# Patient Record
Sex: MALE | Race: WHITE | NOT HISPANIC OR LATINO | Employment: OTHER | URBAN - METROPOLITAN AREA
[De-identification: names, ages, dates, MRNs, and addresses within clinical notes are randomized per-mention and may not be internally consistent; named-entity substitution may affect disease eponyms.]

---

## 2023-03-24 LAB
ALANINE AMINOTRANSFERASE (SGPT) (U/L) IN SER/PLAS: 6 U/L (ref 10–52)
ALBUMIN (G/DL) IN SER/PLAS: 4.1 G/DL (ref 3.4–5)
ALBUMIN (MG/L) IN URINE: 58.3 MG/L
ALBUMIN/CREATININE (UG/MG) IN URINE: 49.8 UG/MG CRT (ref 0–30)
ALKALINE PHOSPHATASE (U/L) IN SER/PLAS: 83 U/L (ref 33–136)
ANION GAP IN SER/PLAS: 12 MMOL/L (ref 10–20)
ASPARTATE AMINOTRANSFERASE (SGOT) (U/L) IN SER/PLAS: 9 U/L (ref 9–39)
BILIRUBIN TOTAL (MG/DL) IN SER/PLAS: 0.8 MG/DL (ref 0–1.2)
CALCIUM (MG/DL) IN SER/PLAS: 9.2 MG/DL (ref 8.6–10.6)
CARBON DIOXIDE, TOTAL (MMOL/L) IN SER/PLAS: 29 MMOL/L (ref 21–32)
CHLORIDE (MMOL/L) IN SER/PLAS: 104 MMOL/L (ref 98–107)
CHOLESTEROL (MG/DL) IN SER/PLAS: 90 MG/DL (ref 0–199)
CHOLESTEROL IN HDL (MG/DL) IN SER/PLAS: 26.8 MG/DL
CHOLESTEROL/HDL RATIO: 3.4
CREATININE (MG/DL) IN SER/PLAS: 1.04 MG/DL (ref 0.5–1.3)
CREATININE (MG/DL) IN URINE: 117 MG/DL (ref 20–370)
ERYTHROCYTE DISTRIBUTION WIDTH (RATIO) BY AUTOMATED COUNT: 19.8 % (ref 11.5–14.5)
ERYTHROCYTE MEAN CORPUSCULAR HEMOGLOBIN CONCENTRATION (G/DL) BY AUTOMATED: 31.5 G/DL (ref 32–36)
ERYTHROCYTE MEAN CORPUSCULAR VOLUME (FL) BY AUTOMATED COUNT: 90 FL (ref 80–100)
ERYTHROCYTES (10*6/UL) IN BLOOD BY AUTOMATED COUNT: 4.53 X10E12/L (ref 4.5–5.9)
ESTIMATED AVERAGE GLUCOSE FOR HBA1C: 169 MG/DL
GFR MALE: 78 ML/MIN/1.73M2
GLUCOSE (MG/DL) IN SER/PLAS: 104 MG/DL (ref 74–99)
HEMATOCRIT (%) IN BLOOD BY AUTOMATED COUNT: 40.9 % (ref 41–52)
HEMOGLOBIN (G/DL) IN BLOOD: 12.9 G/DL (ref 13.5–17.5)
HEMOGLOBIN A1C/HEMOGLOBIN TOTAL IN BLOOD: 7.5 %
LDL: 46 MG/DL (ref 0–99)
LEUKOCYTES (10*3/UL) IN BLOOD BY AUTOMATED COUNT: 8.3 X10E9/L (ref 4.4–11.3)
NATRIURETIC PEPTIDE B (PG/ML) IN SER/PLAS: 1192 PG/ML (ref 0–99)
NRBC (PER 100 WBCS) BY AUTOMATED COUNT: 0 /100 WBC (ref 0–0)
PLATELETS (10*3/UL) IN BLOOD AUTOMATED COUNT: 97 X10E9/L (ref 150–450)
POTASSIUM (MMOL/L) IN SER/PLAS: 4.2 MMOL/L (ref 3.5–5.3)
PROTEIN TOTAL: 6.7 G/DL (ref 6.4–8.2)
SODIUM (MMOL/L) IN SER/PLAS: 141 MMOL/L (ref 136–145)
TRIGLYCERIDE (MG/DL) IN SER/PLAS: 86 MG/DL (ref 0–149)
UREA NITROGEN (MG/DL) IN SER/PLAS: 22 MG/DL (ref 6–23)
VLDL: 17 MG/DL (ref 0–40)

## 2023-08-31 PROBLEM — M54.2 CERVICALGIA: Status: ACTIVE | Noted: 2023-08-31

## 2023-08-31 PROBLEM — L03.90 CELLULITIS: Status: ACTIVE | Noted: 2023-08-31

## 2023-08-31 PROBLEM — M47.27 LUMBOSACRAL SPONDYLOSIS WITH RADICULOPATHY: Status: ACTIVE | Noted: 2023-08-31

## 2023-08-31 PROBLEM — E78.00 ELEVATED LDL CHOLESTEROL LEVEL: Status: ACTIVE | Noted: 2023-08-31

## 2023-08-31 PROBLEM — R29.898 WEAKNESS OF LEFT HAND: Status: ACTIVE | Noted: 2023-08-31

## 2023-08-31 PROBLEM — R50.9 FEVER: Status: ACTIVE | Noted: 2023-08-31

## 2023-08-31 PROBLEM — M48.02 CERVICAL SPINAL STENOSIS: Status: ACTIVE | Noted: 2023-08-31

## 2023-08-31 PROBLEM — R55 SYNCOPE: Status: ACTIVE | Noted: 2023-08-31

## 2023-08-31 PROBLEM — I25.10 CAD (CORONARY ARTERY DISEASE): Status: ACTIVE | Noted: 2023-08-31

## 2023-08-31 PROBLEM — R30.0 DYSURIA: Status: ACTIVE | Noted: 2023-08-31

## 2023-08-31 PROBLEM — D32.9 MENINGIOMA (MULTI): Status: ACTIVE | Noted: 2023-08-31

## 2023-08-31 PROBLEM — Z95.810 CARDIAC DEFIBRILLATOR IN PLACE: Status: ACTIVE | Noted: 2023-08-31

## 2023-08-31 PROBLEM — L27.0 DRUG RASH: Status: ACTIVE | Noted: 2023-08-31

## 2023-08-31 PROBLEM — G47.00 INSOMNIA: Status: ACTIVE | Noted: 2023-08-31

## 2023-08-31 PROBLEM — G89.29 OTHER CHRONIC PAIN: Status: ACTIVE | Noted: 2023-08-31

## 2023-08-31 PROBLEM — M47.22 OSTEOARTHRITIS OF SPINE WITH RADICULOPATHY, CERVICAL REGION: Status: ACTIVE | Noted: 2023-08-31

## 2023-08-31 PROBLEM — R60.0 LOWER LEG EDEMA: Status: ACTIVE | Noted: 2023-08-31

## 2023-08-31 PROBLEM — I10 HYPERTENSION: Status: ACTIVE | Noted: 2023-08-31

## 2023-08-31 PROBLEM — E11.9 DIABETES MELLITUS (MULTI): Status: ACTIVE | Noted: 2023-08-31

## 2023-08-31 PROBLEM — R60.9 EDEMA: Status: ACTIVE | Noted: 2023-08-31

## 2023-08-31 PROBLEM — E11.44 DIABETIC AMYOTROPHY ASSOCIATED WITH TYPE 2 DIABETES MELLITUS (MULTI): Status: ACTIVE | Noted: 2023-08-31

## 2023-08-31 PROBLEM — N52.9 ED (ERECTILE DYSFUNCTION): Status: ACTIVE | Noted: 2023-08-31

## 2023-08-31 PROBLEM — I50.9 CHRONIC CONGESTIVE HEART FAILURE (MULTI): Status: ACTIVE | Noted: 2023-08-31

## 2023-08-31 PROBLEM — H93.13 BILATERAL TINNITUS: Status: ACTIVE | Noted: 2022-12-08

## 2023-08-31 PROBLEM — M79.10 MUSCLE PAIN: Status: ACTIVE | Noted: 2023-08-31

## 2023-08-31 PROBLEM — R31.9 HEMATURIA: Status: ACTIVE | Noted: 2023-08-31

## 2023-08-31 PROBLEM — G62.9 GENERALIZED NEUROPATHY: Status: ACTIVE | Noted: 2023-08-31

## 2023-08-31 PROBLEM — N20.0 NEPHROLITHIASIS: Status: ACTIVE | Noted: 2023-08-31

## 2023-08-31 PROBLEM — I47.20 VENTRICULAR TACHYCARDIA (MULTI): Status: ACTIVE | Noted: 2023-08-31

## 2023-08-31 PROBLEM — L30.1 DYSHIDROTIC ECZEMA: Status: ACTIVE | Noted: 2023-08-31

## 2023-08-31 PROBLEM — I25.2 H/O NON-ST ELEVATION MYOCARDIAL INFARCTION (NSTEMI): Status: ACTIVE | Noted: 2023-08-31

## 2023-09-01 RX ORDER — NITROGLYCERIN 0.4 MG/1
1 TABLET SUBLINGUAL EVERY 5 MIN PRN
COMMUNITY
Start: 2018-12-04 | End: 2024-05-24 | Stop reason: SDUPTHER

## 2023-09-01 RX ORDER — HYDROCODONE BITARTRATE AND ACETAMINOPHEN 7.5; 325 MG/1; MG/1
1 TABLET ORAL SEE ADMIN INSTRUCTIONS
COMMUNITY
Start: 2023-05-30 | End: 2023-10-30 | Stop reason: WASHOUT

## 2023-09-01 RX ORDER — PEN NEEDLE, DIABETIC 31 GX5/16"
1 NEEDLE, DISPOSABLE MISCELLANEOUS
COMMUNITY
Start: 2023-04-21

## 2023-09-01 RX ORDER — NAPROXEN SODIUM 220 MG/1
1 TABLET, FILM COATED ORAL EVERY 24 HOURS
COMMUNITY

## 2023-09-01 RX ORDER — BACLOFEN 10 MG/1
1 TABLET ORAL 3 TIMES DAILY
COMMUNITY
Start: 2019-02-22 | End: 2023-11-16 | Stop reason: WASHOUT

## 2023-09-01 RX ORDER — GLIPIZIDE 10 MG/1
1 TABLET ORAL 2 TIMES DAILY
COMMUNITY
Start: 2018-06-05 | End: 2023-10-12 | Stop reason: ALTCHOICE

## 2023-09-01 RX ORDER — FUROSEMIDE 20 MG/1
1 TABLET ORAL AS NEEDED
COMMUNITY
End: 2023-11-16 | Stop reason: WASHOUT

## 2023-09-01 RX ORDER — ACETAMINOPHEN 325 MG/1
1-2 TABLET ORAL EVERY 4 HOURS PRN
COMMUNITY
Start: 2022-06-27

## 2023-09-01 RX ORDER — AZELASTINE 1 MG/ML
2 SPRAY, METERED NASAL 2 TIMES DAILY
COMMUNITY
End: 2023-11-16 | Stop reason: WASHOUT

## 2023-09-01 RX ORDER — VIT C/E/ZN/COPPR/LUTEIN/ZEAXAN 250MG-90MG
1 CAPSULE ORAL DAILY
COMMUNITY
Start: 2018-06-14

## 2023-09-01 RX ORDER — EPLERENONE 50 MG/1
1 TABLET, FILM COATED ORAL DAILY
COMMUNITY
Start: 2018-12-13 | End: 2023-11-16 | Stop reason: WASHOUT

## 2023-09-01 RX ORDER — PREGABALIN 200 MG/1
1 CAPSULE ORAL 3 TIMES DAILY
COMMUNITY
Start: 2019-12-11 | End: 2023-10-30 | Stop reason: SDUPTHER

## 2023-09-01 RX ORDER — SACUBITRIL AND VALSARTAN 97; 103 MG/1; MG/1
1 TABLET, FILM COATED ORAL 2 TIMES DAILY
COMMUNITY
Start: 2020-01-28 | End: 2024-05-24 | Stop reason: SDUPTHER

## 2023-09-01 RX ORDER — HYDROCODONE BITARTRATE AND ACETAMINOPHEN 10; 325 MG/1; MG/1
1 TABLET ORAL SEE ADMIN INSTRUCTIONS
COMMUNITY
End: 2023-10-30 | Stop reason: WASHOUT

## 2023-09-01 RX ORDER — TORSEMIDE 20 MG/1
1 TABLET ORAL DAILY
COMMUNITY
Start: 2021-03-15 | End: 2023-11-01

## 2023-09-01 RX ORDER — FAMOTIDINE 20 MG/1
1 TABLET, FILM COATED ORAL DAILY
COMMUNITY
Start: 2013-10-03

## 2023-09-01 RX ORDER — VALSARTAN 160 MG/1
1 TABLET ORAL DAILY
COMMUNITY
End: 2023-11-16 | Stop reason: WASHOUT

## 2023-09-01 RX ORDER — FLUTICASONE PROPIONATE 50 MCG
2 SPRAY, SUSPENSION (ML) NASAL DAILY
COMMUNITY
End: 2023-11-16 | Stop reason: WASHOUT

## 2023-09-01 RX ORDER — NEOMYCIN SULFATE, POLYMYXIN B SULFATE AND HYDROCORTISONE 10; 3.5; 1 MG/ML; MG/ML; [USP'U]/ML
4 SUSPENSION/ DROPS AURICULAR (OTIC) 3 TIMES DAILY
COMMUNITY
End: 2023-11-16 | Stop reason: WASHOUT

## 2023-09-01 RX ORDER — CARVEDILOL 25 MG/1
1 TABLET ORAL
COMMUNITY
Start: 2012-05-19 | End: 2024-02-06

## 2023-09-01 RX ORDER — METFORMIN HYDROCHLORIDE 500 MG/1
2 TABLET, EXTENDED RELEASE ORAL 2 TIMES DAILY
COMMUNITY
Start: 2018-06-05 | End: 2023-11-16 | Stop reason: DRUGHIGH

## 2023-09-01 RX ORDER — LOPERAMIDE HYDROCHLORIDE 2 MG/1
1 CAPSULE ORAL 4 TIMES DAILY
COMMUNITY
Start: 2019-10-28

## 2023-09-01 RX ORDER — CIPROFLOXACIN HYDROCHLORIDE 3 MG/ML
1 SOLUTION/ DROPS OPHTHALMIC
COMMUNITY
End: 2023-11-16 | Stop reason: WASHOUT

## 2023-09-01 RX ORDER — EZETIMIBE AND SIMVASTATIN 10; 40 MG/1; MG/1
1 TABLET ORAL NIGHTLY
COMMUNITY
Start: 2013-10-03 | End: 2023-10-12

## 2023-09-01 RX ORDER — HYDROCODONE BITARTRATE AND ACETAMINOPHEN 10; 300 MG/1; MG/1
1 TABLET ORAL 3 TIMES DAILY PRN
COMMUNITY
Start: 2018-06-14 | End: 2023-10-30 | Stop reason: WASHOUT

## 2023-09-19 LAB
ALANINE AMINOTRANSFERASE (SGPT) (U/L) IN SER/PLAS: 22 U/L (ref 10–52)
ALBUMIN (G/DL) IN SER/PLAS: 4.2 G/DL (ref 3.4–5)
ALKALINE PHOSPHATASE (U/L) IN SER/PLAS: 66 U/L (ref 33–136)
ANION GAP IN SER/PLAS: 14 MMOL/L (ref 10–20)
ASPARTATE AMINOTRANSFERASE (SGOT) (U/L) IN SER/PLAS: 25 U/L (ref 9–39)
BILIRUBIN TOTAL (MG/DL) IN SER/PLAS: 0.6 MG/DL (ref 0–1.2)
CALCIUM (MG/DL) IN SER/PLAS: 9.7 MG/DL (ref 8.6–10.6)
CARBON DIOXIDE, TOTAL (MMOL/L) IN SER/PLAS: 29 MMOL/L (ref 21–32)
CHLORIDE (MMOL/L) IN SER/PLAS: 104 MMOL/L (ref 98–107)
CREATININE (MG/DL) IN SER/PLAS: 0.99 MG/DL (ref 0.5–1.3)
ERYTHROCYTE DISTRIBUTION WIDTH (RATIO) BY AUTOMATED COUNT: 20.6 % (ref 11.5–14.5)
ERYTHROCYTE MEAN CORPUSCULAR HEMOGLOBIN CONCENTRATION (G/DL) BY AUTOMATED: 31.3 G/DL (ref 32–36)
ERYTHROCYTE MEAN CORPUSCULAR VOLUME (FL) BY AUTOMATED COUNT: 95 FL (ref 80–100)
ERYTHROCYTES (10*6/UL) IN BLOOD BY AUTOMATED COUNT: 5.46 X10E12/L (ref 4.5–5.9)
ESTIMATED AVERAGE GLUCOSE FOR HBA1C: 177 MG/DL
GFR MALE: 82 ML/MIN/1.73M2
GLUCOSE (MG/DL) IN SER/PLAS: 133 MG/DL (ref 74–99)
HEMATOCRIT (%) IN BLOOD BY AUTOMATED COUNT: 52.1 % (ref 41–52)
HEMOGLOBIN (G/DL) IN BLOOD: 16.3 G/DL (ref 13.5–17.5)
HEMOGLOBIN A1C/HEMOGLOBIN TOTAL IN BLOOD: 7.8 %
LEUKOCYTES (10*3/UL) IN BLOOD BY AUTOMATED COUNT: 9.2 X10E9/L (ref 4.4–11.3)
NRBC (PER 100 WBCS) BY AUTOMATED COUNT: 0 /100 WBC (ref 0–0)
PLATELETS (10*3/UL) IN BLOOD AUTOMATED COUNT: 87 X10E9/L (ref 150–450)
POTASSIUM (MMOL/L) IN SER/PLAS: 4.7 MMOL/L (ref 3.5–5.3)
PROTEIN TOTAL: 7.1 G/DL (ref 6.4–8.2)
SODIUM (MMOL/L) IN SER/PLAS: 142 MMOL/L (ref 136–145)
UREA NITROGEN (MG/DL) IN SER/PLAS: 21 MG/DL (ref 6–23)

## 2023-10-09 ENCOUNTER — LAB (OUTPATIENT)
Dept: LAB | Facility: LAB | Age: 68
End: 2023-10-09
Payer: MEDICARE

## 2023-10-09 ENCOUNTER — OFFICE VISIT (OUTPATIENT)
Dept: PRIMARY CARE | Facility: CLINIC | Age: 68
End: 2023-10-09
Payer: MEDICARE

## 2023-10-09 VITALS
DIASTOLIC BLOOD PRESSURE: 82 MMHG | HEIGHT: 72 IN | BODY MASS INDEX: 25.68 KG/M2 | WEIGHT: 189.6 LBS | SYSTOLIC BLOOD PRESSURE: 122 MMHG

## 2023-10-09 DIAGNOSIS — M48.02 CERVICAL SPINAL STENOSIS: ICD-10-CM

## 2023-10-09 DIAGNOSIS — E11.9 TYPE 2 DIABETES MELLITUS WITHOUT COMPLICATION, WITH LONG-TERM CURRENT USE OF INSULIN (MULTI): ICD-10-CM

## 2023-10-09 DIAGNOSIS — Z79.4 TYPE 2 DIABETES MELLITUS WITHOUT COMPLICATION, WITH LONG-TERM CURRENT USE OF INSULIN (MULTI): ICD-10-CM

## 2023-10-09 DIAGNOSIS — D69.6 THROMBOCYTOPENIA (CMS-HCC): Primary | ICD-10-CM

## 2023-10-09 DIAGNOSIS — D69.6 THROMBOCYTOPENIA (CMS-HCC): ICD-10-CM

## 2023-10-09 DIAGNOSIS — Z95.810 CARDIAC DEFIBRILLATOR IN PLACE: ICD-10-CM

## 2023-10-09 DIAGNOSIS — I50.22 CHRONIC SYSTOLIC CONGESTIVE HEART FAILURE (MULTI): ICD-10-CM

## 2023-10-09 LAB
BASOPHILS # BLD AUTO: 0.09 X10*3/UL (ref 0–0.1)
BASOPHILS NFR BLD AUTO: 1.2 %
EOSINOPHIL # BLD AUTO: 0.62 X10*3/UL (ref 0–0.7)
EOSINOPHIL NFR BLD AUTO: 8.4 %
ERYTHROCYTE [DISTWIDTH] IN BLOOD BY AUTOMATED COUNT: 19.4 % (ref 11.5–14.5)
HCT VFR BLD AUTO: 52.5 % (ref 41–52)
HGB BLD-MCNC: 16.7 G/DL (ref 13.5–17.5)
IMM GRANULOCYTES # BLD AUTO: 0.02 X10*3/UL (ref 0–0.7)
IMM GRANULOCYTES NFR BLD AUTO: 0.3 % (ref 0–0.9)
LYMPHOCYTES # BLD AUTO: 1.2 X10*3/UL (ref 1.2–4.8)
LYMPHOCYTES NFR BLD AUTO: 16.2 %
MCH RBC QN AUTO: 30.1 PG (ref 26–34)
MCHC RBC AUTO-ENTMCNC: 31.8 G/DL (ref 32–36)
MCV RBC AUTO: 95 FL (ref 80–100)
MONOCYTES # BLD AUTO: 0.54 X10*3/UL (ref 0.1–1)
MONOCYTES NFR BLD AUTO: 7.3 %
NEUTROPHILS # BLD AUTO: 4.95 X10*3/UL (ref 1.2–7.7)
NEUTROPHILS NFR BLD AUTO: 66.6 %
NRBC BLD-RTO: 0 /100 WBCS (ref 0–0)
PLATELET # BLD AUTO: 86 X10*3/UL (ref 150–450)
PMV BLD AUTO: ABNORMAL FL
RBC # BLD AUTO: 5.55 X10*6/UL (ref 4.5–5.9)
WBC # BLD AUTO: 7.4 X10*3/UL (ref 4.4–11.3)

## 2023-10-09 PROCEDURE — 4010F ACE/ARB THERAPY RXD/TAKEN: CPT | Performed by: INTERNAL MEDICINE

## 2023-10-09 PROCEDURE — 1159F MED LIST DOCD IN RCRD: CPT | Performed by: INTERNAL MEDICINE

## 2023-10-09 PROCEDURE — 3079F DIAST BP 80-89 MM HG: CPT | Performed by: INTERNAL MEDICINE

## 2023-10-09 PROCEDURE — 1126F AMNT PAIN NOTED NONE PRSNT: CPT | Performed by: INTERNAL MEDICINE

## 2023-10-09 PROCEDURE — 3074F SYST BP LT 130 MM HG: CPT | Performed by: INTERNAL MEDICINE

## 2023-10-09 PROCEDURE — G0008 ADMIN INFLUENZA VIRUS VAC: HCPCS | Performed by: INTERNAL MEDICINE

## 2023-10-09 PROCEDURE — 36415 COLL VENOUS BLD VENIPUNCTURE: CPT

## 2023-10-09 PROCEDURE — G0439 PPPS, SUBSEQ VISIT: HCPCS | Performed by: INTERNAL MEDICINE

## 2023-10-09 PROCEDURE — 90662 IIV NO PRSV INCREASED AG IM: CPT | Performed by: INTERNAL MEDICINE

## 2023-10-09 PROCEDURE — 3051F HG A1C>EQUAL 7.0%<8.0%: CPT | Performed by: INTERNAL MEDICINE

## 2023-10-09 PROCEDURE — 85025 COMPLETE CBC W/AUTO DIFF WBC: CPT

## 2023-10-09 PROCEDURE — 1036F TOBACCO NON-USER: CPT | Performed by: INTERNAL MEDICINE

## 2023-10-09 PROCEDURE — 99214 OFFICE O/P EST MOD 30 MIN: CPT | Performed by: INTERNAL MEDICINE

## 2023-10-09 ASSESSMENT — ENCOUNTER SYMPTOMS
OCCASIONAL FEELINGS OF UNSTEADINESS: 0
LOSS OF SENSATION IN FEET: 0
DEPRESSION: 0

## 2023-10-09 NOTE — PROGRESS NOTES
Subjective   Reason for Visit: Ha Payne is an 68 y.o. male here for a Medicare Wellness visit.     Past Medical, Surgical, and Family History reviewed and updated in chart.    Reviewed all medications by prescribing practitioner or clinical pharmacist (such as prescriptions, OTCs, herbal therapies and supplements) and documented in the medical record.    No recent hospitalizations.    All medications reviewed and reconciled by me the provider..  No use of controlled substances or opiates.    Family history, social history reviewed, no changes.    Patient does not smoke.    Patient does not drink.    Patient hydrates adequately daily.  Eats a well-balanced healthy diet.     Does not exercise, limited due to heart disease.  Patient denies any difficulty with memory or cognition.     Denies any difficulty with hearing.  Patient does not wear hearing aids.    No recent falls.  Denies any difficulty walking.    Patient with no history of depression anxiety, denies any loss of interest, no feeling of sadness, no lack of motivation.    Patient is independent in all ADLs and IADLs.  Independent bathing, dressing, walking.  Takes care of own finances, shopping and cooking.     End-of-life decision-making power of  reviewed with patient.  Patient does not have power of .      HPI  Mr. Payne is a pleasant 68-year-old male with history of systolic heart failure, type 2 diabetes and spinal stenosis here for annual wellness visit and routine care.  We discussed his chronic medical issues.  Takes all his medications.  Physically active around the house.  Independent.  He has worsening pain in his back and neck, going to the left shoulder.  No weakness numbness tingling of the hands or arms.  No chest pain shortness of breath.  Otherwise he has no complaints.    Family history, social history reviewed.  Patient Care Team:  Cali Issa MD as PCP - General (Internal Medicine)  Cali Issa MD as PCP -  [facility] ACO Attributed Provider  Cali Issa MD     Review of Systems:  No fevers no chills, no unintentional weight changes.  No night sweats.    No URI symptoms.    No new or unusual headaches.    No cough no wheezing.    No chest pain or shortness of breath.  No orthopnea no PND.  No palpitations.    Appetite intact, no nausea vomiting diarrhea, no reflux-like symptoms.  No abdominal pain.  No dark stools.    No heat or cold intolerance, constipation diarrhea, unintentional weight changes.    No change in memory    Objective   Vitals:  /82   Ht 1.829 m (6')   Wt 86 kg (189 lb 9.5 oz)   BMI 25.71 kg/m²       Physical Exam  Calm coherent and appropriate    Neck is supple and none tender    Breathing comfortably, clear to auscultation bilaterally    Regular rate rhythm, no murmur gallops or rubs.  Good distal pulses.  No edema.  No JVD.    Abdomen soft and nontender, normal bowel sounds.    Extremities warm well perfused with no clubbing or cyanosis.  Neck is supple, he has bilateral significant trapezius muscle tenderness and spasm, has very poor upper back posture, shoulder joints intact, elbows intact, wrist intact.   is 5 out of 5 bilaterally.    Cognition intact.    Assessment/Plan   Mrs. Payne is a pleasant 68-year-old male with coronary disease, systolic heart failure, type 2 diabetes, spinal stenosis here for annual wellness.  In addition we discussed his chronic medical issues.    Coronary disease with systolic heart failure: He is on optimal medical therapy including aspirin, statin, Eplerenone, carvedilol and torsemide, EF of 35%.  Blood pressure at goal.  On exam he is warm and dry.  He is weight has been stable.  Follows with cardiology.    Type 2 diabetes: On glipizide, Levemir, metformin, followed by endocrinology, A1c checked last month 7.9.  Today we discussed SGLT2 both for glycemic control and heart failure, again he declines.  He also has appointment with Dr. Burciaga coming  up.    Spinal stenosis: Followed by pain management.  He now has pain on his neck as well, intact neurovascular exam, I think some of this is cervical stenosis but also due to his very poor posture, recommend physical therapy patient declines.    Thrombocytopenia: Looking back last CBC was somewhat unusual with elevated hemoglobin and low platelets, repeat levels today, if abnormal will need further work-up.  I will call him with the results.    Health maintenance: Declines colon cancer screening.  High-dose flu shot given today.  They are going to get COVID booster.  I also recommend to get RSV vaccine in November.  Discussed end-of-life decision-making power of  with patient and wife.  Patient with no signs or symptoms of BPH, no significant first-degree family history, discussed benefits versus risks of screening, patient declines screening.  Call if he develops symptoms.       Follow-up 6 months.

## 2023-10-09 NOTE — PROGRESS NOTES
Subjective   Reason for Visit: Ha Payne is an 68 y.o. male here for a Medicare Wellness visit.               HPI    Patient Care Team:  Cali Issa MD as PCP - General (Internal Medicine)  Cali Issa MD as PCP - [facility] ACO Attributed Provider  Cali Issa MD     Review of Systems    Objective   Vitals:  There were no vitals taken for this visit.      Physical Exam    Assessment/Plan   Problem List Items Addressed This Visit    None

## 2023-10-10 DIAGNOSIS — D69.6 THROMBOCYTOPENIA (CMS-HCC): Primary | ICD-10-CM

## 2023-10-11 DIAGNOSIS — I25.10 CORONARY ARTERY DISEASE, UNSPECIFIED VESSEL OR LESION TYPE, UNSPECIFIED WHETHER ANGINA PRESENT, UNSPECIFIED WHETHER NATIVE OR TRANSPLANTED HEART: Primary | ICD-10-CM

## 2023-10-12 ENCOUNTER — TELEPHONE (OUTPATIENT)
Dept: CARDIOLOGY | Facility: CLINIC | Age: 68
End: 2023-10-12
Payer: MEDICARE

## 2023-10-12 DIAGNOSIS — I25.10 CORONARY ARTERY DISEASE, UNSPECIFIED VESSEL OR LESION TYPE, UNSPECIFIED WHETHER ANGINA PRESENT, UNSPECIFIED WHETHER NATIVE OR TRANSPLANTED HEART: ICD-10-CM

## 2023-10-12 DIAGNOSIS — E11.9 TYPE 2 DIABETES MELLITUS WITHOUT COMPLICATION, WITH LONG-TERM CURRENT USE OF INSULIN (MULTI): Primary | ICD-10-CM

## 2023-10-12 DIAGNOSIS — Z79.4 TYPE 2 DIABETES MELLITUS WITHOUT COMPLICATION, WITH LONG-TERM CURRENT USE OF INSULIN (MULTI): Primary | ICD-10-CM

## 2023-10-12 RX ORDER — EZETIMIBE AND SIMVASTATIN 10; 40 MG/1; MG/1
1 TABLET ORAL NIGHTLY
Qty: 90 TABLET | Refills: 3 | Status: SHIPPED | OUTPATIENT
Start: 2023-10-12 | End: 2023-10-12 | Stop reason: SDUPTHER

## 2023-10-12 RX ORDER — GLIPIZIDE 5 MG/1
5 TABLET ORAL 3 TIMES DAILY
COMMUNITY
Start: 2023-10-11 | End: 2023-10-12 | Stop reason: SDUPTHER

## 2023-10-12 RX ORDER — EZETIMIBE AND SIMVASTATIN 10; 40 MG/1; MG/1
1 TABLET ORAL NIGHTLY
Qty: 90 TABLET | Refills: 3 | Status: SHIPPED | OUTPATIENT
Start: 2023-10-12

## 2023-10-12 RX ORDER — GLIPIZIDE 5 MG/1
5 TABLET ORAL
Qty: 270 TABLET | Refills: 3 | Status: SHIPPED | OUTPATIENT
Start: 2023-10-12 | End: 2023-12-18 | Stop reason: SDUPTHER

## 2023-10-18 RX ORDER — INSULIN DETEMIR 100 [IU]/ML
INJECTION, SOLUTION SUBCUTANEOUS
COMMUNITY
Start: 2023-10-11 | End: 2024-03-11 | Stop reason: SDUPTHER

## 2023-10-19 ENCOUNTER — APPOINTMENT (OUTPATIENT)
Dept: CARDIOLOGY | Facility: CLINIC | Age: 68
End: 2023-10-19
Payer: MEDICARE

## 2023-10-25 ENCOUNTER — APPOINTMENT (OUTPATIENT)
Dept: PAIN MEDICINE | Facility: CLINIC | Age: 68
End: 2023-10-25
Payer: MEDICARE

## 2023-10-30 ENCOUNTER — OFFICE VISIT (OUTPATIENT)
Dept: PAIN MEDICINE | Facility: CLINIC | Age: 68
End: 2023-10-30
Payer: MEDICARE

## 2023-10-30 VITALS
RESPIRATION RATE: 16 BRPM | SYSTOLIC BLOOD PRESSURE: 117 MMHG | HEIGHT: 72 IN | WEIGHT: 187 LBS | DIASTOLIC BLOOD PRESSURE: 71 MMHG | BODY MASS INDEX: 25.33 KG/M2

## 2023-10-30 DIAGNOSIS — G89.4 CHRONIC PAIN DISORDER: ICD-10-CM

## 2023-10-30 DIAGNOSIS — M48.02 CERVICAL SPINAL STENOSIS: Primary | ICD-10-CM

## 2023-10-30 DIAGNOSIS — M48.062 SPINAL STENOSIS OF LUMBAR REGION WITH NEUROGENIC CLAUDICATION: ICD-10-CM

## 2023-10-30 DIAGNOSIS — M47.27 LUMBOSACRAL SPONDYLOSIS WITH RADICULOPATHY: ICD-10-CM

## 2023-10-30 PROCEDURE — 3074F SYST BP LT 130 MM HG: CPT | Performed by: PHYSICIAN ASSISTANT

## 2023-10-30 PROCEDURE — 1159F MED LIST DOCD IN RCRD: CPT | Performed by: PHYSICIAN ASSISTANT

## 2023-10-30 PROCEDURE — 99214 OFFICE O/P EST MOD 30 MIN: CPT | Performed by: PHYSICIAN ASSISTANT

## 2023-10-30 PROCEDURE — 1160F RVW MEDS BY RX/DR IN RCRD: CPT | Performed by: PHYSICIAN ASSISTANT

## 2023-10-30 PROCEDURE — 3051F HG A1C>EQUAL 7.0%<8.0%: CPT | Performed by: PHYSICIAN ASSISTANT

## 2023-10-30 PROCEDURE — 1036F TOBACCO NON-USER: CPT | Performed by: PHYSICIAN ASSISTANT

## 2023-10-30 PROCEDURE — 3078F DIAST BP <80 MM HG: CPT | Performed by: PHYSICIAN ASSISTANT

## 2023-10-30 PROCEDURE — 4010F ACE/ARB THERAPY RXD/TAKEN: CPT | Performed by: PHYSICIAN ASSISTANT

## 2023-10-30 PROCEDURE — 1125F AMNT PAIN NOTED PAIN PRSNT: CPT | Performed by: PHYSICIAN ASSISTANT

## 2023-10-30 RX ORDER — PREGABALIN 200 MG/1
200 CAPSULE ORAL 3 TIMES DAILY
Qty: 90 CAPSULE | Refills: 2 | Status: SHIPPED | OUTPATIENT
Start: 2023-10-30 | End: 2024-03-29 | Stop reason: SDUPTHER

## 2023-10-30 RX ORDER — HYDROCODONE BITARTRATE AND ACETAMINOPHEN 7.5; 325 MG/1; MG/1
1 TABLET ORAL SEE ADMIN INSTRUCTIONS
Qty: 120 TABLET | Refills: 0 | Status: SHIPPED | OUTPATIENT
Start: 2023-10-30 | End: 2023-11-28 | Stop reason: SDUPTHER

## 2023-10-30 ASSESSMENT — ENCOUNTER SYMPTOMS
CHILLS: 0
PALPITATIONS: 0
NAUSEA: 0
DIARRHEA: 0
COUGH: 0
SHORTNESS OF BREATH: 0
FATIGUE: 0
FEVER: 0
NECK PAIN: 1
MYALGIAS: 1
WHEEZING: 0
VOICE CHANGE: 0
ACTIVITY CHANGE: 0
UNEXPECTED WEIGHT CHANGE: 0
VOMITING: 0
SLEEP DISTURBANCE: 0
BACK PAIN: 1

## 2023-10-30 ASSESSMENT — LIFESTYLE VARIABLES: TOTAL SCORE: 0

## 2023-10-30 ASSESSMENT — PAIN SCALES - GENERAL
PAINLEVEL_OUTOF10: 9
PAINLEVEL: 9

## 2023-10-30 ASSESSMENT — PATIENT HEALTH QUESTIONNAIRE - PHQ9
SUM OF ALL RESPONSES TO PHQ9 QUESTIONS 1 AND 2: 0
1. LITTLE INTEREST OR PLEASURE IN DOING THINGS: NOT AT ALL
2. FEELING DOWN, DEPRESSED OR HOPELESS: NOT AT ALL

## 2023-10-30 ASSESSMENT — PAIN - FUNCTIONAL ASSESSMENT: PAIN_FUNCTIONAL_ASSESSMENT: 0-10

## 2023-10-30 ASSESSMENT — PAIN DESCRIPTION - DESCRIPTORS: DESCRIPTORS: TINGLING;NUMBNESS

## 2023-10-30 NOTE — ASSESSMENT & PLAN NOTE
I had nice discussion with the patient today our plan will be as follows.      Radiology: [ none at this time ]      Physically:  [ continue modification of activities, healthy lifestyle choice, both me and primary care are recommended physical therapy patient has no desire to complete. ]      Psychologically:  [ No acute psychological concerns ]      Medication: [I will refill the medications at the same dose and frequency. We will continue to monitor the patient monthly for compliance, adverse reaction or interations The patient continues to see benefit and improvement in their quality of life and ability to maintain ADLs. Patient educated about the risks of taking opioids and operating a motor vehicle. Patient reports no adverse side effects to current medication regimen. Current regimen does allow patient to maintain ADLs. Oarrs has been reviewed. No suspicion of diversion or abuse. Compliance with medication regime, no use of illicit drugs, no sharing of narcotic medications with others, do not use others narcotic medication, and to avoid alcohol use. Patient has been educated on the risks, benefits, and alternatives of controlled substances as well as the proper way to store these medications.   The patient and I discussed the nature of this medication and its side effects. We discussed tolerance, physical dependence, psychological dependence, addiction and opioid-induced hyperalgesia ]      Duration:  [ 1 month ]      Intervention:  [Next appointment patient will need to be involved in psychological counseling for continued opiate use.  Due to his overuse pattern behaviors and difficulty with being compliant I think it would be prudent to that he will be monitored under counseling for psychological issues related to chronic pain this will be part of the plan of care and patient is not willing to do other parts of our plan of care which is part of the pain management agreement if patient does not want to  participate in care he can find another pain management provider.]

## 2023-10-30 NOTE — PROGRESS NOTES
MEDICATION NAME: norco  STRENGTH: 7.5/325mg  LAST FILL DATE: 23  DATE LAST TAKEN: 10/26/23  QUANTITY FILLED: 120  QUANTITY REMAIN  COUNT COMPLETED BY: DEANNA ALFARO RN and TRENTON RINCON    CONTROLLED SUBSTANCES AGREEMENT LAST SIGNED:2023   ORT LAST COMPLETED:10/2023  Modified Osw[employer] disability form filled out annually.

## 2023-10-30 NOTE — PROGRESS NOTES
Subjective   Patient ID: Ha Payne is a 68 y.o. male who presents for Back Pain.  Patient is a 68-year-old male with cervical spinal stenosis and lumbosacral stenosis with neurogenic claudication and radiculopathy and chronic pain the presents today for follow-up.  Patient states that the medications seem to be better at 4-day.  He states that he spoke with my supervising physician and feels that he is going to want to follow with me body like that he is still on the 4-day he reports that he is will be using his medications without overuse his wife is here present today and states that she is going to continue and closely monitor his medications he does have a lot of other chronic medical disease states that he does follow-up with his primary care    Back Pain  Pertinent negatives include no chest pain or fever.       Review of Systems   Constitutional:  Negative for activity change, chills, fatigue, fever and unexpected weight change.   HENT:  Negative for ear pain and voice change.    Eyes:  Negative for visual disturbance.   Respiratory:  Negative for cough, shortness of breath and wheezing.    Cardiovascular:  Negative for chest pain and palpitations.   Gastrointestinal:  Negative for diarrhea, nausea and vomiting.   Musculoskeletal:  Positive for back pain, gait problem, myalgias and neck pain.   Psychiatric/Behavioral:  Negative for behavioral problems, self-injury, sleep disturbance and suicidal ideas.        Objective   Physical Exam  Vitals reviewed.   Constitutional:       Appearance: Normal appearance.   HENT:      Head: Normocephalic and atraumatic.      Mouth/Throat:      Mouth: Mucous membranes are moist.   Neck:      Vascular: No JVD.   Pulmonary:      Effort: Pulmonary effort is normal. No tachypnea or bradypnea.   Abdominal:      Palpations: Abdomen is soft.   Musculoskeletal:      Cervical back: Tenderness present. Decreased range of motion.      Lumbar back: Tenderness present. Decreased  range of motion. Positive right straight leg raise test and positive left straight leg raise test.   Skin:     General: Skin is warm and dry.   Neurological:      Mental Status: He is alert and oriented to person, place, and time.   Psychiatric:         Mood and Affect: Mood normal.         Behavior: Behavior normal. Behavior is cooperative.         Assessment/Plan   Problem List Items Addressed This Visit             ICD-10-CM    Lumbosacral spondylosis with radiculopathy M47.27    Relevant Medications    pregabalin (Lyrica) 200 mg capsule    HYDROcodone-acetaminophen (Norco) 7.5-325 mg tablet    Other Relevant Orders    Referral to [facility]    Cervical spinal stenosis - Primary M48.02    Relevant Medications    pregabalin (Lyrica) 200 mg capsule    HYDROcodone-acetaminophen (Norco) 7.5-325 mg tablet    Other Relevant Orders    Referral to [facility]    Spinal stenosis of lumbar region with neurogenic claudication M48.062     I had nice discussion with the patient today our plan will be as follows.      Radiology: [ none at this time ]      Physically:  [ continue modification of activities, healthy lifestyle choice, both me and primary care are recommended physical therapy patient has no desire to complete. ]      Psychologically:  [ No acute psychological concerns ]      Medication: [I will refill the medications at the same dose and frequency. We will continue to monitor the patient monthly for compliance, adverse reaction or interations The patient continues to see benefit and improvement in their quality of life and ability to maintain ADLs. Patient educated about the risks of taking opioids and operating a motor vehicle. Patient reports no adverse side effects to current medication regimen. Current regimen does allow patient to maintain ADLs. Oarrs has been reviewed. No suspicion of diversion or abuse. Compliance with medication regime, no use of illicit drugs, no  sharing of narcotic medications with others, do not use others narcotic medication, and to avoid alcohol use. Patient has been educated on the risks, benefits, and alternatives of controlled substances as well as the proper way to store these medications.   The patient and I discussed the nature of this medication and its side effects. We discussed tolerance, physical dependence, psychological dependence, addiction and opioid-induced hyperalgesia ]      Duration:  [ 1 month ]      Intervention:  [Next appointment patient will need to be involved in psychological counseling for continued opiate use.  Due to his overuse pattern behaviors and difficulty with being compliant I think it would be prudent to that he will be monitored under counseling for psychological issues related to chronic pain this will be part of the plan of care and patient is not willing to do other parts of our plan of care which is part of the pain management agreement if patient does not want to participate in care he can find another pain management provider.]           Relevant Medications    pregabalin (Lyrica) 200 mg capsule    HYDROcodone-acetaminophen (Norco) 7.5-325 mg tablet    Other Relevant Orders    Referral to [facility]     Other Visit Diagnoses         Codes    Chronic pain disorder     G89.4    Relevant Orders    Referral to [facility]

## 2023-11-01 DIAGNOSIS — I25.10 CORONARY ARTERY DISEASE, UNSPECIFIED VESSEL OR LESION TYPE, UNSPECIFIED WHETHER ANGINA PRESENT, UNSPECIFIED WHETHER NATIVE OR TRANSPLANTED HEART: Primary | ICD-10-CM

## 2023-11-01 RX ORDER — TORSEMIDE 20 MG/1
20 TABLET ORAL DAILY
Qty: 90 TABLET | Refills: 3 | Status: SHIPPED | OUTPATIENT
Start: 2023-11-01

## 2023-11-06 ENCOUNTER — LAB (OUTPATIENT)
Dept: LAB | Facility: LAB | Age: 68
End: 2023-11-06
Payer: MEDICARE

## 2023-11-06 DIAGNOSIS — D69.6 THROMBOCYTOPENIA (CMS-HCC): ICD-10-CM

## 2023-11-06 LAB
BASOPHILS # BLD AUTO: 0.09 X10*3/UL (ref 0–0.1)
BASOPHILS NFR BLD AUTO: 1.5 %
EOSINOPHIL # BLD AUTO: 0.42 X10*3/UL (ref 0–0.7)
EOSINOPHIL NFR BLD AUTO: 7.2 %
ERYTHROCYTE [DISTWIDTH] IN BLOOD BY AUTOMATED COUNT: 18.6 % (ref 11.5–14.5)
FERRITIN SERPL-MCNC: 108 NG/ML (ref 20–300)
FOLATE SERPL-MCNC: 9.4 NG/ML
HCT VFR BLD AUTO: 47.9 % (ref 41–52)
HGB BLD-MCNC: 15 G/DL (ref 13.5–17.5)
IMM GRANULOCYTES # BLD AUTO: 0.01 X10*3/UL (ref 0–0.7)
IMM GRANULOCYTES NFR BLD AUTO: 0.2 % (ref 0–0.9)
IRON SATN MFR SERPL: 11 % (ref 25–45)
IRON SERPL-MCNC: 40 UG/DL (ref 35–150)
LYMPHOCYTES # BLD AUTO: 0.87 X10*3/UL (ref 1.2–4.8)
LYMPHOCYTES NFR BLD AUTO: 15 %
MCH RBC QN AUTO: 30.9 PG (ref 26–34)
MCHC RBC AUTO-ENTMCNC: 31.3 G/DL (ref 32–36)
MCV RBC AUTO: 99 FL (ref 80–100)
MONOCYTES # BLD AUTO: 0.42 X10*3/UL (ref 0.1–1)
MONOCYTES NFR BLD AUTO: 7.2 %
NEUTROPHILS # BLD AUTO: 4 X10*3/UL (ref 1.2–7.7)
NEUTROPHILS NFR BLD AUTO: 68.9 %
NRBC BLD-RTO: 0 /100 WBCS (ref 0–0)
PLATELET # BLD AUTO: 103 X10*3/UL (ref 150–450)
RBC # BLD AUTO: 4.85 X10*6/UL (ref 4.5–5.9)
TIBC SERPL-MCNC: 361 UG/DL (ref 240–445)
UIBC SERPL-MCNC: 321 UG/DL (ref 110–370)
VIT B12 SERPL-MCNC: 254 PG/ML (ref 211–911)
WBC # BLD AUTO: 5.8 X10*3/UL (ref 4.4–11.3)

## 2023-11-06 PROCEDURE — 36415 COLL VENOUS BLD VENIPUNCTURE: CPT

## 2023-11-06 PROCEDURE — 82607 VITAMIN B-12: CPT

## 2023-11-06 PROCEDURE — 82746 ASSAY OF FOLIC ACID SERUM: CPT

## 2023-11-06 PROCEDURE — 83550 IRON BINDING TEST: CPT

## 2023-11-06 PROCEDURE — 82728 ASSAY OF FERRITIN: CPT

## 2023-11-06 PROCEDURE — 83540 ASSAY OF IRON: CPT

## 2023-11-06 PROCEDURE — 85025 COMPLETE CBC W/AUTO DIFF WBC: CPT

## 2023-11-13 ENCOUNTER — TELEPHONE (OUTPATIENT)
Dept: PRIMARY CARE | Facility: CLINIC | Age: 68
End: 2023-11-13
Payer: MEDICARE

## 2023-11-15 PROBLEM — I51.3 LEFT VENTRICULAR APICAL THROMBUS: Status: ACTIVE | Noted: 2023-11-15

## 2023-11-15 PROBLEM — I25.2 HISTORY OF MYOCARDIAL INFARCTION: Status: ACTIVE | Noted: 2023-11-15

## 2023-11-16 ENCOUNTER — OFFICE VISIT (OUTPATIENT)
Dept: CARDIOLOGY | Facility: CLINIC | Age: 68
End: 2023-11-16
Payer: MEDICARE

## 2023-11-16 VITALS
BODY MASS INDEX: 26.44 KG/M2 | DIASTOLIC BLOOD PRESSURE: 73 MMHG | HEART RATE: 99 BPM | SYSTOLIC BLOOD PRESSURE: 110 MMHG | HEIGHT: 72 IN | WEIGHT: 195.2 LBS

## 2023-11-16 DIAGNOSIS — I25.10 CORONARY ARTERY DISEASE, UNSPECIFIED VESSEL OR LESION TYPE, UNSPECIFIED WHETHER ANGINA PRESENT, UNSPECIFIED WHETHER NATIVE OR TRANSPLANTED HEART: ICD-10-CM

## 2023-11-16 PROCEDURE — 3074F SYST BP LT 130 MM HG: CPT | Performed by: INTERNAL MEDICINE

## 2023-11-16 PROCEDURE — 99214 OFFICE O/P EST MOD 30 MIN: CPT | Performed by: INTERNAL MEDICINE

## 2023-11-16 PROCEDURE — 93005 ELECTROCARDIOGRAM TRACING: CPT | Mod: PO | Performed by: INTERNAL MEDICINE

## 2023-11-16 PROCEDURE — 3051F HG A1C>EQUAL 7.0%<8.0%: CPT | Performed by: INTERNAL MEDICINE

## 2023-11-16 PROCEDURE — 1159F MED LIST DOCD IN RCRD: CPT | Performed by: INTERNAL MEDICINE

## 2023-11-16 PROCEDURE — 1160F RVW MEDS BY RX/DR IN RCRD: CPT | Performed by: INTERNAL MEDICINE

## 2023-11-16 PROCEDURE — 1036F TOBACCO NON-USER: CPT | Performed by: INTERNAL MEDICINE

## 2023-11-16 PROCEDURE — 99214 OFFICE O/P EST MOD 30 MIN: CPT | Mod: PO | Performed by: INTERNAL MEDICINE

## 2023-11-16 PROCEDURE — 93010 ELECTROCARDIOGRAM REPORT: CPT | Performed by: INTERNAL MEDICINE

## 2023-11-16 PROCEDURE — 1126F AMNT PAIN NOTED NONE PRSNT: CPT | Performed by: INTERNAL MEDICINE

## 2023-11-16 PROCEDURE — 3078F DIAST BP <80 MM HG: CPT | Performed by: INTERNAL MEDICINE

## 2023-11-16 RX ORDER — METFORMIN HYDROCHLORIDE 500 MG/1
500 TABLET, EXTENDED RELEASE ORAL
Qty: 90 TABLET | Refills: 1 | Status: SHIPPED | OUTPATIENT
Start: 2023-11-16 | End: 2024-05-20 | Stop reason: ALTCHOICE

## 2023-11-16 RX ORDER — EPLERENONE 50 MG/1
50 TABLET, FILM COATED ORAL DAILY
COMMUNITY

## 2023-11-16 ASSESSMENT — PAIN SCALES - GENERAL: PAINLEVEL: 0-NO PAIN

## 2023-11-16 NOTE — PROGRESS NOTES
"Primary Care Physician: Cali Issa MD  Date of Visit: 11/16/2023 10:00 AM EST  Location of visit: [address]     Chief Complaint:     Coronary Artery Disease     HPI/Summary  Ha Payne is a 68 y.o. male who presents for followup cardiology evaluation.     The patient presents for 6-month reevaluation.  He was hospitalized in July, 2022 for management of acute decompensated heart failure, likely secondary to medication noncompliance.    The patient is more than 20 years status post myocardial infarction.  He has undergone several coronary interventions.  In 2016, we identified an apical mural thrombus with severe LV dysfunction, and prescribed a course of anticoagulation.  He also developed some \"clumsiness\" involving the left hand, which was self-limited, but he preferred to discontinue warfarin anticoagulation.  In October, 2019 he presented to the emergency room with ACS but declined hospitalization.  He has also declined treatment with an SGLT2 inhibitor.  He is limited by severe chronic back pain and his care has been somewhat compromised by medication noncompliance.    He remains on aspirin, carvedilol, Entresto, eplerenone, ezetimibe, simvastatin, and torsemide.  Diabetes regimen includes glipizide and Levemir.    He is about the same as previously.  Occasionally, he takes an extra torsemide.  He is not follow a particularly salt restricted diet.  He is mainly limited by his chronic low back pain and he ambulates with a cane.  He can only walk a few 100 feet before resting.  He denies PND and orthopnea.    Specialty Problems          Cardiology Problems    CAD (coronary artery disease)    Cardiac defibrillator in place     2005         Chronic congestive heart failure (CMS/[facility])     September, 2012: EF 35-40.  April, 2016: EF 30.  October, 2019: EF 25.  February, 2021: EF 35.  July, 2022: EF 30-35.         Elevated LDL cholesterol level    H/O non-ST elevation myocardial infarction (NSTEMI)     "          Hypertension    Ventricular tachycardia (CMS/[facility])    History of myocardial infarction      Myocardial infarction . Reinfarction . 2012: Status post PCI to a      severe stenosis in the inferior limb of the obtuse marginal circumflex. Mild diffuse left      main, chronic total occlusion of LAD, collaterals from distal RCA to LAD, 60% first      marginal, 90% second marginal, 30% mid RCA, 40% in-stent restenosis of RCA.         Left ventricular apical thrombus     Apical mural thrombus             Past Medical History:   Diagnosis Date    Acute ischemic heart disease, unspecified (CMS/[facility]) 2022    Acute coronary syndrome    Intracardiac thrombosis, not elsewhere classified 2022    Apical mural thrombus    Old myocardial infarction 2022    History of acute myocardial infarction    Opioid dependence, uncomplicated (CMS/[facility]) 10/03/2013    Opioid dependence    Other conditions influencing health status 2013    Disorder Of The Pelvis / Hip Joint / Femur    Other conditions influencing health status 2013    Toxicity From Drugs, Medicaments, Or Biological Substances          No past surgical history on file.         Social History     Tobacco Use    Smoking status: Former     Types: Cigarettes     Quit date:      Years since quittin.8    Smokeless tobacco: Never   Vaping Use    Vaping Use: Never used   Substance Use Topics    Alcohol use: Never    Drug use: Never        Physical Activity: Not on file            Allergies   Allergen Reactions    Amoxicillin-Pot Clavulanate Swelling and Unknown    Diclofenac Sodium Other    Prochlorperazine Unknown and Other     Pt states he has spasms.    Ticagrelor Rash         Current Outpatient Medications   Medication Instructions    acetaminophen (Tylenol) 325 mg tablet 1-2 tablets, oral, Every 4 hours PRN    aspirin 81 mg chewable tablet 1 tablet, oral, Every 24 hours    BD Ultra-Fine Mini Pen Needle 31  "gauge x 3/16\" needle 1 each    carvedilol (Coreg) 25 mg tablet 1 tablet, oral, 2 times daily with meals    cholecalciferol (Vitamin D-3) 25 MCG (1000 UT) capsule 1 capsule, oral, Daily    Entresto  mg tablet 1 tablet, oral, 2 times daily    eplerenone (INSPRA) 50 mg, oral, Daily    ezetimibe-simvastatin (Vytorin) 10-40 mg tablet 1 tablet, oral, Nightly    famotidine (Pepcid) 20 mg tablet 1 tablet, oral, Daily    glipiZIDE (GLUCOTROL) 5 mg, oral, 2 times daily before meals, 2 tablets in am and 1 tablet in qpm    HYDROcodone-acetaminophen (Norco) 7.5-325 mg tablet 1 tablet, oral, See admin instructions, 1 tablet as needed Orally every 6-8 hrs for 15 days    insulin detemir (LEVEMIR) 26 Units, subcutaneous, Every morning, inject 20 units in the morning and 30 units in the evening    Levemir FlexPen 100 unit/mL (3 mL) pen     loperamide (Imodium A-D) 2 mg capsule 1 capsule, oral, 4 times daily    metFORMIN XR (GLUCOPHAGE-XR) 500 mg, oral, Daily with evening meal    nitroglycerin (Nitrostat) 0.4 mg SL tablet 1 tablet, sublingual, Every 5 min PRN    pen needle, diabetic (BD ULTRA-FINE MINI PEN NEEDLE MISC) 1 Stick, miscellaneous, 2 times daily    pregabalin (LYRICA) 200 mg, oral, 3 times daily    torsemide (DEMADEX) 20 mg, oral, Daily       ROS     Vital Signs:  Vitals:    11/16/23 1013   BP: 110/73   BP Location: Left arm   Patient Position: Sitting   BP Cuff Size: Adult   Pulse: 99   Weight: 88.5 kg (195 lb 3.2 oz)   Height: 1.829 m (6')     Wt Readings from Last 5 Encounters:   11/16/23 88.5 kg (195 lb 3.2 oz)   10/30/23 84.8 kg (187 lb)   10/09/23 86 kg (189 lb 9.5 oz)   04/26/23 88.5 kg (195 lb)   03/22/23 87.9 kg (193 lb 12.8 oz)     Body mass index is 26.47 kg/m².     Physical Exam:    On examination, he was conversant, not in extreme pain today.  There were no audible wheezes or rhonchi and there were no rales.  Regular heart sounds, with no murmurs noted.  Abdomen slightly obese.  There was 1+ pretibial " edema today.  Otherwise, he was not examined.     Last Labs:  CMP:  Recent Labs     09/19/23  1405 03/24/23  0832 11/08/22  0958 07/10/22  1816 07/09/22  1908    141 140 139 139   K 4.7 4.2 4.6 4.7 4.1    104 103 98 98   CO2 29 29 28 28 28   ANIONGAP 14 12 14 18 17   BUN 21 22 14 32* 38*   CREATININE 0.99 1.04 0.79 0.92 1.30   GLUCOSE 133* 104* 124* 146* 209*     Recent Labs     09/19/23  1405 03/24/23  0832 11/08/22  0958 07/10/22  1816 07/09/22  1908 07/08/22  1507 07/08/22  0700 07/07/22  1603   ALBUMIN 4.2 4.1 4.1 4.2 3.9   < > 4.3 4.1   ALKPHOS 66 83 62  --   --   --  77 69   ALT 22 6* 5*  --   --   --  4* 7*   AST 25 9 10  --   --   --  11 12   BILITOT 0.6 0.8 0.8  --   --   --  1.1 0.6    < > = values in this interval not displayed.     CBC:  Recent Labs     11/06/23  1159 10/09/23  1027 09/19/23  1405 03/24/23  0832 07/10/22  1816   WBC 5.8 7.4 9.2 8.3 8.0   HGB 15.0 16.7 16.3 12.9* 14.6   HCT 47.9 52.5* 52.1* 40.9* 46.5   * 86* 87* 97* 120*   MCV 99 95 95 90 93     COAG:   Recent Labs     07/07/22  1603 09/09/21  0549 02/08/21  0815 02/05/21  2152 10/27/19  1750   INR 1.2* 1.0 1.1 1.3* 1.2*     HEME/ENDO:  Recent Labs     11/06/23  1159 09/19/23  1405 03/24/23  0832 11/08/22  0958 07/09/22  1908 07/08/22  0700 12/09/21  1110 09/09/21  0549 08/16/21  1623 02/05/21  2152   FERRITIN 108  --   --   --   --   --   --   --   --   --    IRONSAT 11*  --   --   --   --   --   --   --   --   --    TSH  --   --   --   --  1.86 0.65  --  2.19  --  1.05   HGBA1C  --  7.8* 7.5* 7.1* 6.8*  --    < >  --    < >  --     < > = values in this interval not displayed.      CARDIAC:   Recent Labs     03/24/23  0832 07/07/22  1945 07/07/22  1722 07/07/22  1603 09/09/21  0549 04/23/21  1015 02/05/21  0743   TROPHS  --  11 13 15  --   --   --    BNP 1,192*  --   --  1,263* 113* 295* 978*     Recent Labs     03/24/23  0832 05/11/22  0904 02/06/21  1849 10/13/20  0840   CHOL 90 112  --  129   LDLF 46 58  --  57    HDL 26.8* 29.2*  --  39.2*   TRIG 86 124 148 163*       Last Cardiology Tests:    ECG:    An ECG was obtained today.  Sinus rhythm with first-degree AV block.  Peer interval 222 ms.  Left atrial enlargement.  Left axis.  Inferior and anterolateral infarction age undetermined.    Echo: July 8, 2022       CONCLUSIONS:   1. The left ventricular systolic function is moderately to severely decreased with a 30-35% estimated ejection fraction.   2. Spectral Doppler shows an impaired relaxation pattern of left ventricular diastolic filling.   3. There is no evidence of left ventricular hypertrophy.   4. There is global hypokinesis of the left ventricle with minor regional variations.      Cath:      Stress Test:  Stress Results:  No results found for this or any previous visit from the past 365 days.         Cardiac Imaging:        Assessment/Plan     The patient's complex problem list was again reviewed.  We reviewed several recent hospitalizations and we discussed the 2022 echocardiogram with the patient and with his wife.  We also discussed the markedly elevated BNP.  We told him that he can take an extra torsemide if he notices some edema or if he gains more than 1 or 2 pounds.  We discussed that he would very likely benefit from an SGLT2 inhibitor.  Again, after discussion, he was comfortable with the current medication regimen and would not wish to be on any more medications.  No reason for any additional testing as yet.  Medications will be renewed as appropriate, he will be monitored by primary care and by endocrinology, with a return to cardiology clinic at 6-month intervals.      Orders:  Orders Placed This Encounter   Procedures    ECG 12 Lead      Followup Appts:  Future Appointments   Date Time Provider Department Center   11/28/2023  9:30 AM Best Gonzalez PA-C [id number] [facility]   3/11/2024  9:45 AM Amelie Richardson MD [id number] [facility]   4/15/2024  9:20 AM Cali Issa MD [id number] [facility]            ____________________________________________________________  Burt Larsen MD    Senior Attending Physician  [facility]  [facility]    [facility] Chair for Cardiovascular Excellence  [facility] [employer] [facility]

## 2023-11-20 LAB
ATRIAL RATE: 83 BPM
P AXIS: 40 DEGREES
P OFFSET: 166 MS
P ONSET: 108 MS
PR INTERVAL: 222 MS
Q ONSET: 219 MS
QRS COUNT: 13 BEATS
QRS DURATION: 88 MS
QT INTERVAL: 396 MS
QTC CALCULATION(BAZETT): 465 MS
QTC FREDERICIA: 441 MS
R AXIS: -54 DEGREES
T AXIS: 112 DEGREES
T OFFSET: 417 MS
VENTRICULAR RATE: 83 BPM

## 2023-11-28 ENCOUNTER — OFFICE VISIT (OUTPATIENT)
Dept: PAIN MEDICINE | Facility: CLINIC | Age: 68
End: 2023-11-28
Payer: MEDICARE

## 2023-11-28 VITALS
HEART RATE: 92 BPM | BODY MASS INDEX: 25.33 KG/M2 | RESPIRATION RATE: 22 BRPM | DIASTOLIC BLOOD PRESSURE: 64 MMHG | WEIGHT: 187 LBS | HEIGHT: 72 IN | SYSTOLIC BLOOD PRESSURE: 108 MMHG

## 2023-11-28 DIAGNOSIS — M47.27 LUMBOSACRAL SPONDYLOSIS WITH RADICULOPATHY: ICD-10-CM

## 2023-11-28 DIAGNOSIS — M48.062 SPINAL STENOSIS OF LUMBAR REGION WITH NEUROGENIC CLAUDICATION: ICD-10-CM

## 2023-11-28 DIAGNOSIS — E11.44 DIABETIC AMYOTROPHY ASSOCIATED WITH TYPE 2 DIABETES MELLITUS (MULTI): Primary | ICD-10-CM

## 2023-11-28 DIAGNOSIS — M48.02 CERVICAL SPINAL STENOSIS: ICD-10-CM

## 2023-11-28 PROCEDURE — 1125F AMNT PAIN NOTED PAIN PRSNT: CPT | Performed by: PHYSICIAN ASSISTANT

## 2023-11-28 PROCEDURE — 1036F TOBACCO NON-USER: CPT | Performed by: PHYSICIAN ASSISTANT

## 2023-11-28 PROCEDURE — 3074F SYST BP LT 130 MM HG: CPT | Performed by: PHYSICIAN ASSISTANT

## 2023-11-28 PROCEDURE — 99214 OFFICE O/P EST MOD 30 MIN: CPT | Performed by: PHYSICIAN ASSISTANT

## 2023-11-28 PROCEDURE — 3078F DIAST BP <80 MM HG: CPT | Performed by: PHYSICIAN ASSISTANT

## 2023-11-28 PROCEDURE — 1159F MED LIST DOCD IN RCRD: CPT | Performed by: PHYSICIAN ASSISTANT

## 2023-11-28 PROCEDURE — 3051F HG A1C>EQUAL 7.0%<8.0%: CPT | Performed by: PHYSICIAN ASSISTANT

## 2023-11-28 PROCEDURE — 1160F RVW MEDS BY RX/DR IN RCRD: CPT | Performed by: PHYSICIAN ASSISTANT

## 2023-11-28 RX ORDER — HYDROCODONE BITARTRATE AND ACETAMINOPHEN 7.5; 325 MG/1; MG/1
1 TABLET ORAL SEE ADMIN INSTRUCTIONS
Qty: 120 TABLET | Refills: 0 | Status: SHIPPED | OUTPATIENT
Start: 2023-11-28 | End: 2023-11-29 | Stop reason: SDUPTHER

## 2023-11-28 RX ORDER — HYDROCODONE BITARTRATE AND ACETAMINOPHEN 7.5; 325 MG/1; MG/1
1 TABLET ORAL EVERY 6 HOURS PRN
Qty: 120 TABLET | Refills: 0 | Status: SHIPPED | OUTPATIENT
Start: 2023-11-28 | End: 2023-12-28 | Stop reason: WASHOUT

## 2023-11-28 ASSESSMENT — PATIENT HEALTH QUESTIONNAIRE - PHQ9
2. FEELING DOWN, DEPRESSED OR HOPELESS: NOT AT ALL
SUM OF ALL RESPONSES TO PHQ9 QUESTIONS 1 & 2: 0
1. LITTLE INTEREST OR PLEASURE IN DOING THINGS: NOT AT ALL

## 2023-11-28 ASSESSMENT — PAIN - FUNCTIONAL ASSESSMENT: PAIN_FUNCTIONAL_ASSESSMENT: 0-10

## 2023-11-28 ASSESSMENT — ENCOUNTER SYMPTOMS
FATIGUE: 0
WHEEZING: 0
BACK PAIN: 1
ARTHRALGIAS: 1
VOMITING: 0
SHORTNESS OF BREATH: 0
NECK PAIN: 1
FEVER: 0
DIARRHEA: 0
SLEEP DISTURBANCE: 0
VOICE CHANGE: 0
ACTIVITY CHANGE: 0
NAUSEA: 0
PALPITATIONS: 0
COUGH: 0
UNEXPECTED WEIGHT CHANGE: 0
CHILLS: 0

## 2023-11-28 ASSESSMENT — PAIN SCALES - GENERAL
PAINLEVEL_OUTOF10: 8
PAINLEVEL: 8

## 2023-11-28 ASSESSMENT — LIFESTYLE VARIABLES
HOW OFTEN DO YOU HAVE SIX OR MORE DRINKS ON ONE OCCASION: NEVER
HOW OFTEN DO YOU HAVE A DRINK CONTAINING ALCOHOL: NEVER
SKIP TO QUESTIONS 9-10: 1
HOW MANY STANDARD DRINKS CONTAINING ALCOHOL DO YOU HAVE ON A TYPICAL DAY: PATIENT DOES NOT DRINK
AUDIT-C TOTAL SCORE: 0

## 2023-11-28 NOTE — PROGRESS NOTES
Routine check in for med refill.    MEDICATION NAME: hydrocodoen / acet  STRENGTH: 7.5/325 mg  LAST FILL DATE: 10/30/2023  DATE LAST TAKEN: 2023  QUANTITY FILLED: 120   QUANTITY REMAININ  COUNT COMPLETED BY: FRANK PITTMAN LPN

## 2023-11-28 NOTE — PROGRESS NOTES
Subjective   Patient ID: Ha Payne is a 68 y.o. male who presents for Back Pain.  Patient is a 68-year-old male with cervical spinal stenosis lumbar stenosis and spondylosis with neurogenic claudication and diabetic neuropathy the presents today for follow-up.  Patient did notes that he did get a counselor he had an appointment on 11 7 with a therapist at [facility] Elmer Villafuerte.  Patient states that this seemed to be a good therapy.  Patient denies he continues to have cervical shoulder low back hips legs and feet symptoms.  His wife is now here and corroborates some of this information and she is monitoring his narcotic use.    Back Pain  Pertinent negatives include no chest pain or fever.       Review of Systems   Constitutional:  Negative for activity change, chills, fatigue, fever and unexpected weight change.   HENT:  Negative for ear pain and voice change.    Eyes:  Negative for visual disturbance.   Respiratory:  Negative for cough, shortness of breath and wheezing.    Cardiovascular:  Negative for chest pain and palpitations.   Gastrointestinal:  Negative for diarrhea, nausea and vomiting.   Musculoskeletal:  Positive for arthralgias, back pain, gait problem and neck pain.   Psychiatric/Behavioral:  Negative for behavioral problems, self-injury, sleep disturbance and suicidal ideas.        Objective   Physical Exam  Vitals reviewed.   Constitutional:       Appearance: Normal appearance.   HENT:      Head: Normocephalic and atraumatic.      Mouth/Throat:      Mouth: Mucous membranes are moist.   Neck:      Vascular: No JVD.   Pulmonary:      Effort: Pulmonary effort is normal. No tachypnea or bradypnea.   Abdominal:      Palpations: Abdomen is soft.   Musculoskeletal:      Cervical back: Tenderness present. No spasms. Decreased range of motion.      Lumbar back: Spasms and tenderness present. Decreased range of motion. Positive right straight leg raise test and positive  left straight leg raise test.      Comments: Hypoestheisa BLE and single point cane for amb.   Skin:     General: Skin is warm and dry.   Neurological:      Mental Status: He is alert and oriented to person, place, and time.   Psychiatric:         Mood and Affect: Mood normal.         Behavior: Behavior normal. Behavior is cooperative.         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      Radiology: [ none at this time ]      Physically:  [ continue modification of activities, healthy lifestyle choice, counseling to be continued ]      Psychologically:  [ No acute psychological concerns ]      Medication: [I will refill the medications at the same dose and frequency. We will continue to monitor the patient bimonthly for compliance, adverse reaction or interations The patient continues to see benefit and improvement in their quality of life and ability to maintain ADLs. Patient educated about the risks of taking opioids and operating a motor vehicle. Patient reports no adverse side effects to current medication regimen. Current regimen does allow patient to maintain ADLs. Oarrs has been reviewed. No suspicion of diversion or abuse. Compliance with medication regime, no use of illicit drugs, no sharing of narcotic medications with others, do not use others narcotic medication, and to avoid alcohol use. Patient has been educated on the risks, benefits, and alternatives of controlled substances as well as the proper way to store these medications.   The patient and I discussed the nature of this medication and its side effects. We discussed tolerance,  physical dependence, psychological dependence, addiction and opioid-induced hyperalgesia ]      Duration:  [ 2 months ]      Intervention:  [ none at this time. Patient is stable.  ]

## 2023-11-28 NOTE — ASSESSMENT & PLAN NOTE
I had nice discussion with the patient today our plan will be as follows.      Radiology: [ none at this time ]      Physically:  [ continue modification of activities, healthy lifestyle choice, counseling to be continued ]      Psychologically:  [ No acute psychological concerns ]      Medication: [I will refill the medications at the same dose and frequency. We will continue to monitor the patient bimonthly for compliance, adverse reaction or interations The patient continues to see benefit and improvement in their quality of life and ability to maintain ADLs. Patient educated about the risks of taking opioids and operating a motor vehicle. Patient reports no adverse side effects to current medication regimen. Current regimen does allow patient to maintain ADLs. Oarrs has been reviewed. No suspicion of diversion or abuse. Compliance with medication regime, no use of illicit drugs, no sharing of narcotic medications with others, do not use others narcotic medication, and to avoid alcohol use. Patient has been educated on the risks, benefits, and alternatives of controlled substances as well as the proper way to store these medications.   The patient and I discussed the nature of this medication and its side effects. We discussed tolerance, physical dependence, psychological dependence, addiction and opioid-induced hyperalgesia ]      Duration:  [ 2 months ]      Intervention:  [ none at this time. Patient is stable.  ]

## 2023-11-29 DIAGNOSIS — M47.27 LUMBOSACRAL SPONDYLOSIS WITH RADICULOPATHY: ICD-10-CM

## 2023-11-29 DIAGNOSIS — M48.062 SPINAL STENOSIS OF LUMBAR REGION WITH NEUROGENIC CLAUDICATION: ICD-10-CM

## 2023-11-29 DIAGNOSIS — M48.02 CERVICAL SPINAL STENOSIS: ICD-10-CM

## 2023-11-29 RX ORDER — HYDROCODONE BITARTRATE AND ACETAMINOPHEN 7.5; 325 MG/1; MG/1
1 TABLET ORAL EVERY 6 HOURS PRN
Qty: 120 TABLET | Refills: 0 | Status: CANCELLED | OUTPATIENT
Start: 2023-11-29 | End: 2023-12-29

## 2023-11-29 RX ORDER — HYDROCODONE BITARTRATE AND ACETAMINOPHEN 7.5; 325 MG/1; MG/1
1 TABLET ORAL EVERY 6 HOURS PRN
Qty: 120 TABLET | Refills: 0 | Status: SHIPPED | OUTPATIENT
Start: 2023-11-29 | End: 2023-12-29 | Stop reason: WASHOUT

## 2023-11-29 NOTE — TELEPHONE ENCOUNTER
"Please resend script for December fill date. Pharmacist told patient that dec script sent yesterday just read \"per administration instructions\" rather than q6hrs prn so they can't fill it when due in December.  "

## 2023-12-04 ENCOUNTER — HOSPITAL ENCOUNTER (OUTPATIENT)
Dept: CARDIOLOGY | Facility: CLINIC | Age: 68
Discharge: HOME | End: 2023-12-04
Payer: MEDICARE

## 2023-12-04 DIAGNOSIS — Z95.0 PRESENCE OF CARDIAC PACEMAKER: ICD-10-CM

## 2023-12-04 DIAGNOSIS — I42.5 OTHER RESTRICTIVE CARDIOMYOPATHY (MULTI): ICD-10-CM

## 2023-12-04 PROCEDURE — 93296 REM INTERROG EVL PM/IDS: CPT

## 2023-12-04 PROCEDURE — 93295 DEV INTERROG REMOTE 1/2/MLT: CPT | Performed by: INTERNAL MEDICINE

## 2023-12-18 DIAGNOSIS — E11.9 TYPE 2 DIABETES MELLITUS WITHOUT COMPLICATION, WITH LONG-TERM CURRENT USE OF INSULIN (MULTI): ICD-10-CM

## 2023-12-18 DIAGNOSIS — Z79.4 TYPE 2 DIABETES MELLITUS WITHOUT COMPLICATION, WITH LONG-TERM CURRENT USE OF INSULIN (MULTI): ICD-10-CM

## 2023-12-18 RX ORDER — GLIPIZIDE 5 MG/1
TABLET ORAL
Qty: 270 TABLET | Refills: 3 | Status: SHIPPED | OUTPATIENT
Start: 2023-12-18 | End: 2024-05-20 | Stop reason: DRUGHIGH

## 2024-01-21 DIAGNOSIS — M48.062 SPINAL STENOSIS OF LUMBAR REGION WITH NEUROGENIC CLAUDICATION: ICD-10-CM

## 2024-01-21 DIAGNOSIS — M48.02 CERVICAL SPINAL STENOSIS: ICD-10-CM

## 2024-01-21 DIAGNOSIS — M47.27 LUMBOSACRAL SPONDYLOSIS WITH RADICULOPATHY: ICD-10-CM

## 2024-01-26 ENCOUNTER — APPOINTMENT (OUTPATIENT)
Dept: PAIN MEDICINE | Facility: CLINIC | Age: 69
End: 2024-01-26
Payer: MEDICARE

## 2024-01-31 ENCOUNTER — OFFICE VISIT (OUTPATIENT)
Dept: PAIN MEDICINE | Facility: CLINIC | Age: 69
End: 2024-01-31
Payer: MEDICARE

## 2024-01-31 VITALS
HEIGHT: 72 IN | DIASTOLIC BLOOD PRESSURE: 103 MMHG | WEIGHT: 192 LBS | RESPIRATION RATE: 20 BRPM | SYSTOLIC BLOOD PRESSURE: 125 MMHG | HEART RATE: 90 BPM | BODY MASS INDEX: 26.01 KG/M2

## 2024-01-31 DIAGNOSIS — M48.062 SPINAL STENOSIS OF LUMBAR REGION WITH NEUROGENIC CLAUDICATION: ICD-10-CM

## 2024-01-31 DIAGNOSIS — M47.27 LUMBOSACRAL SPONDYLOSIS WITH RADICULOPATHY: ICD-10-CM

## 2024-01-31 DIAGNOSIS — Z79.899 HISTORY OF ONGOING TREATMENT WITH HIGH-RISK MEDICATION: Primary | ICD-10-CM

## 2024-01-31 DIAGNOSIS — M48.02 CERVICAL SPINAL STENOSIS: ICD-10-CM

## 2024-01-31 PROCEDURE — 99214 OFFICE O/P EST MOD 30 MIN: CPT | Performed by: PHYSICIAN ASSISTANT

## 2024-01-31 PROCEDURE — 1036F TOBACCO NON-USER: CPT | Performed by: PHYSICIAN ASSISTANT

## 2024-01-31 PROCEDURE — 3080F DIAST BP >= 90 MM HG: CPT | Performed by: PHYSICIAN ASSISTANT

## 2024-01-31 PROCEDURE — 1159F MED LIST DOCD IN RCRD: CPT | Performed by: PHYSICIAN ASSISTANT

## 2024-01-31 PROCEDURE — 1160F RVW MEDS BY RX/DR IN RCRD: CPT | Performed by: PHYSICIAN ASSISTANT

## 2024-01-31 PROCEDURE — 1125F AMNT PAIN NOTED PAIN PRSNT: CPT | Performed by: PHYSICIAN ASSISTANT

## 2024-01-31 PROCEDURE — 3074F SYST BP LT 130 MM HG: CPT | Performed by: PHYSICIAN ASSISTANT

## 2024-01-31 RX ORDER — HYDROCODONE BITARTRATE AND ACETAMINOPHEN 7.5; 325 MG/1; MG/1
1 TABLET ORAL EVERY 6 HOURS PRN
Qty: 120 TABLET | Refills: 0 | Status: SHIPPED | OUTPATIENT
Start: 2024-01-31 | End: 2024-03-29 | Stop reason: SDUPTHER

## 2024-01-31 RX ORDER — PREGABALIN 200 MG/1
200 CAPSULE ORAL 3 TIMES DAILY
Qty: 90 CAPSULE | Refills: 2 | Status: CANCELLED | OUTPATIENT
Start: 2024-01-31 | End: 2024-04-30

## 2024-01-31 RX ORDER — PREGABALIN 200 MG/1
200 CAPSULE ORAL 3 TIMES DAILY
Qty: 90 CAPSULE | Refills: 0 | OUTPATIENT
Start: 2024-01-31

## 2024-01-31 ASSESSMENT — ENCOUNTER SYMPTOMS
ACTIVITY CHANGE: 0
NECK PAIN: 1
ARTHRALGIAS: 1
BACK PAIN: 1
FEVER: 0
SHORTNESS OF BREATH: 0
WHEEZING: 0
VOMITING: 0
VOICE CHANGE: 0
CHILLS: 0
UNEXPECTED WEIGHT CHANGE: 0
COUGH: 0
SLEEP DISTURBANCE: 0
PALPITATIONS: 0
DIARRHEA: 0
FATIGUE: 0
NAUSEA: 0

## 2024-01-31 ASSESSMENT — LIFESTYLE VARIABLES
HOW OFTEN DO YOU HAVE A DRINK CONTAINING ALCOHOL: NEVER
HOW OFTEN DO YOU HAVE SIX OR MORE DRINKS ON ONE OCCASION: NEVER
AUDIT-C TOTAL SCORE: 0
HOW MANY STANDARD DRINKS CONTAINING ALCOHOL DO YOU HAVE ON A TYPICAL DAY: PATIENT DOES NOT DRINK
SKIP TO QUESTIONS 9-10: 1

## 2024-01-31 ASSESSMENT — PAIN SCALES - GENERAL
PAINLEVEL: 9
PAINLEVEL_OUTOF10: 9

## 2024-01-31 ASSESSMENT — PAIN - FUNCTIONAL ASSESSMENT: PAIN_FUNCTIONAL_ASSESSMENT: 0-10

## 2024-01-31 ASSESSMENT — PAIN DESCRIPTION - DESCRIPTORS: DESCRIPTORS: ACHING;DULL;SHARP;BURNING

## 2024-01-31 ASSESSMENT — PATIENT HEALTH QUESTIONNAIRE - PHQ9
SUM OF ALL RESPONSES TO PHQ9 QUESTIONS 1 & 2: 0
1. LITTLE INTEREST OR PLEASURE IN DOING THINGS: NOT AT ALL
2. FEELING DOWN, DEPRESSED OR HOPELESS: NOT AT ALL

## 2024-01-31 NOTE — PROGRESS NOTES
SPO2  89    MEDICATION NAME: Hydrocodone   STRENGTH: 7.5-325  LAST FILL DATE: 23  DATE LAST TAKEN: 24  QUANTITY FILLED: 120  QUANTITY REMAININ  COUNT COMPLETED BY: TRENTON RINCON and Imani RN

## 2024-01-31 NOTE — PROGRESS NOTES
Subjective   Patient ID: Ha Payne is a 69 y.o. male who presents for Back Pain (Back and neck).  Patient is a 69-year-old male with spinal stenosis lumbar spondylosis with neurogenic claudication that presents for follow-up.  Wife does provide some historical information.  Patient continues to have neck low back and extremity pain.  Patient continues to have generalized joint aches.  Mostly focused to the neck and back.  Patient denotes that with winter months they tend to do less leaving of the house.  This is partially why they have not had a counseling appointment.  They do have plans to make an appointment at the end of March.  Utilizes a cane to help with ambulation his medications are providing the benefit to his pain control patient still not interested in any surgery or interventional options    Back Pain  Pertinent negatives include no chest pain or fever.       Review of Systems   Constitutional:  Negative for activity change, chills, fatigue, fever and unexpected weight change.   HENT:  Negative for ear pain and voice change.    Eyes:  Negative for visual disturbance.   Respiratory:  Negative for cough, shortness of breath and wheezing.    Cardiovascular:  Negative for chest pain and palpitations.   Gastrointestinal:  Negative for diarrhea, nausea and vomiting.   Musculoskeletal:  Positive for arthralgias, back pain, gait problem and neck pain.   Psychiatric/Behavioral:  Negative for behavioral problems, self-injury, sleep disturbance and suicidal ideas.        Objective   Physical Exam  Vitals reviewed.   Constitutional:       Appearance: Normal appearance.   HENT:      Head: Normocephalic and atraumatic.      Mouth/Throat:      Mouth: Mucous membranes are moist.   Neck:      Vascular: No JVD.   Pulmonary:      Effort: Pulmonary effort is normal. No tachypnea or bradypnea.   Abdominal:      Palpations: Abdomen is soft.   Musculoskeletal:      Cervical back: Tenderness present. No spasms.  Decreased range of motion.      Lumbar back: Spasms and tenderness present. Decreased range of motion. Positive right straight leg raise test and positive left straight leg raise test.      Comments: Hypoestheisa BLE and single point cane for amb.   Skin:     General: Skin is warm and dry.   Neurological:      Mental Status: He is alert and oriented to person, place, and time.   Psychiatric:         Mood and Affect: Mood normal.         Behavior: Behavior normal. Behavior is cooperative.         Assessment/Plan   Problem List Items Addressed This Visit             ICD-10-CM    Lumbosacral spondylosis with radiculopathy M47.27    Relevant Medications    HYDROcodone-acetaminophen (Norco) 7.5-325 mg tablet    HYDROcodone-acetaminophen (Norco) 7.5-325 mg tablet    Cervical spinal stenosis M48.02    Relevant Medications    HYDROcodone-acetaminophen (Norco) 7.5-325 mg tablet    HYDROcodone-acetaminophen (Norco) 7.5-325 mg tablet    Spinal stenosis of lumbar region with neurogenic claudication M48.062    Relevant Medications    HYDROcodone-acetaminophen (Norco) 7.5-325 mg tablet    HYDROcodone-acetaminophen (Norco) 7.5-325 mg tablet     Other Visit Diagnoses         Codes    History of ongoing treatment with high-risk medication    -  Primary Z79.899    Relevant Orders    oral tox screen; [facility]; 460683 - Miscellaneous Test        I had nice discussion with the patient today our plan will be as follows.      Radiology: [ none at this time ]      Physically:  [ continue modification of activities, healthy lifestyle choice ]      Psychologically:  [ No acute psychological concerns, I did remind patient that is important to continue with her plan of care maintaining the psychological component for his chronic pain]      Medication: [I will refill the medications at the same dose and frequency. We will continue to monitor the patient bimonthly for compliance, adverse reaction or interations The patient continues to see  benefit and improvement in their quality of life and ability to maintain ADLs. Patient educated about the risks of taking opioids and operating a motor vehicle. Patient reports no adverse side effects to current medication regimen. Current regimen does allow patient to maintain ADLs. Oarrs has been reviewed. No suspicion of diversion or abuse. Compliance with medication regime, no use of illicit drugs, no sharing of narcotic medications with others, do not use others narcotic medication, and to avoid alcohol use. Patient has been educated on the risks, benefits, and alternatives of controlled substances as well as the proper way to store these medications.   The patient and I discussed the nature of this medication and its side effects. We discussed tolerance, physical dependence, psychological dependence, addiction and opioid-induced hyperalgesia   Patient submit sample for tox screen]      Duration:  [ 2 months ]      Intervention:  [ none at this time. Patient is stable.  ]           Best Gonzalez PA-C 01/31/24 8:42 AM

## 2024-02-05 ENCOUNTER — TELEPHONE (OUTPATIENT)
Dept: PAIN MEDICINE | Facility: CLINIC | Age: 69
End: 2024-02-05
Payer: MEDICARE

## 2024-02-06 DIAGNOSIS — I50.22 CHRONIC SYSTOLIC CONGESTIVE HEART FAILURE (MULTI): Primary | ICD-10-CM

## 2024-02-06 RX ORDER — CARVEDILOL 25 MG/1
25 TABLET ORAL
Qty: 180 TABLET | Refills: 3 | Status: SHIPPED | OUTPATIENT
Start: 2024-02-06

## 2024-02-09 LAB — SCAN RESULT: NORMAL

## 2024-03-04 ENCOUNTER — HOSPITAL ENCOUNTER (OUTPATIENT)
Dept: CARDIOLOGY | Facility: CLINIC | Age: 69
Discharge: HOME | End: 2024-03-04
Payer: MEDICARE

## 2024-03-04 DIAGNOSIS — Z95.0 PRESENCE OF CARDIAC PACEMAKER: ICD-10-CM

## 2024-03-04 DIAGNOSIS — I42.5 OTHER RESTRICTIVE CARDIOMYOPATHY (MULTI): ICD-10-CM

## 2024-03-11 ENCOUNTER — LAB (OUTPATIENT)
Dept: LAB | Facility: LAB | Age: 69
End: 2024-03-11
Payer: MEDICARE

## 2024-03-11 ENCOUNTER — OFFICE VISIT (OUTPATIENT)
Dept: ENDOCRINOLOGY | Facility: CLINIC | Age: 69
End: 2024-03-11
Payer: MEDICARE

## 2024-03-11 VITALS
SYSTOLIC BLOOD PRESSURE: 120 MMHG | DIASTOLIC BLOOD PRESSURE: 70 MMHG | WEIGHT: 198 LBS | HEART RATE: 80 BPM | BODY MASS INDEX: 26.82 KG/M2 | RESPIRATION RATE: 16 BRPM | HEIGHT: 72 IN

## 2024-03-11 DIAGNOSIS — E11.9 TYPE 2 DIABETES MELLITUS WITHOUT COMPLICATION, WITH LONG-TERM CURRENT USE OF INSULIN (MULTI): Primary | ICD-10-CM

## 2024-03-11 DIAGNOSIS — E11.9 TYPE 2 DIABETES MELLITUS WITHOUT COMPLICATION, WITH LONG-TERM CURRENT USE OF INSULIN (MULTI): ICD-10-CM

## 2024-03-11 DIAGNOSIS — Z79.4 TYPE 2 DIABETES MELLITUS WITHOUT COMPLICATION, WITH LONG-TERM CURRENT USE OF INSULIN (MULTI): ICD-10-CM

## 2024-03-11 DIAGNOSIS — Z79.4 TYPE 2 DIABETES MELLITUS WITHOUT COMPLICATION, WITH LONG-TERM CURRENT USE OF INSULIN (MULTI): Primary | ICD-10-CM

## 2024-03-11 DIAGNOSIS — I10 HYPERTENSION, UNSPECIFIED TYPE: ICD-10-CM

## 2024-03-11 DIAGNOSIS — E78.00 ELEVATED LDL CHOLESTEROL LEVEL: ICD-10-CM

## 2024-03-11 LAB
ALBUMIN SERPL BCP-MCNC: 4.3 G/DL (ref 3.4–5)
ALP SERPL-CCNC: 100 U/L (ref 33–136)
ALT SERPL W P-5'-P-CCNC: 9 U/L (ref 10–52)
ANION GAP SERPL CALC-SCNC: 14 MMOL/L (ref 10–20)
AST SERPL W P-5'-P-CCNC: 12 U/L (ref 9–39)
BILIRUB SERPL-MCNC: 0.9 MG/DL (ref 0–1.2)
BUN SERPL-MCNC: 25 MG/DL (ref 6–23)
CALCIUM SERPL-MCNC: 9.4 MG/DL (ref 8.6–10.6)
CHLORIDE SERPL-SCNC: 99 MMOL/L (ref 98–107)
CHOLEST SERPL-MCNC: 89 MG/DL (ref 0–199)
CHOLESTEROL/HDL RATIO: 3.1
CO2 SERPL-SCNC: 32 MMOL/L (ref 21–32)
CREAT SERPL-MCNC: 0.98 MG/DL (ref 0.5–1.3)
EGFRCR SERPLBLD CKD-EPI 2021: 83 ML/MIN/1.73M*2
ERYTHROCYTE [DISTWIDTH] IN BLOOD BY AUTOMATED COUNT: 20.5 % (ref 11.5–14.5)
EST. AVERAGE GLUCOSE BLD GHB EST-MCNC: 189 MG/DL
GLUCOSE SERPL-MCNC: 132 MG/DL (ref 74–99)
HBA1C MFR BLD: 8.2 %
HCT VFR BLD AUTO: 46.3 % (ref 41–52)
HDLC SERPL-MCNC: 29 MG/DL
HGB BLD-MCNC: 14.7 G/DL (ref 13.5–17.5)
LDLC SERPL CALC-MCNC: 40 MG/DL
MCH RBC QN AUTO: 29.3 PG (ref 26–34)
MCHC RBC AUTO-ENTMCNC: 31.7 G/DL (ref 32–36)
MCV RBC AUTO: 92 FL (ref 80–100)
NON HDL CHOLESTEROL: 60 MG/DL (ref 0–149)
NRBC BLD-RTO: 0 /100 WBCS (ref 0–0)
PLATELET # BLD AUTO: 91 X10*3/UL (ref 150–450)
POTASSIUM SERPL-SCNC: 4.1 MMOL/L (ref 3.5–5.3)
PROT SERPL-MCNC: 6.9 G/DL (ref 6.4–8.2)
RBC # BLD AUTO: 5.01 X10*6/UL (ref 4.5–5.9)
SODIUM SERPL-SCNC: 141 MMOL/L (ref 136–145)
TRIGL SERPL-MCNC: 99 MG/DL (ref 0–149)
VLDL: 20 MG/DL (ref 0–40)
WBC # BLD AUTO: 7.3 X10*3/UL (ref 4.4–11.3)

## 2024-03-11 PROCEDURE — 85027 COMPLETE CBC AUTOMATED: CPT

## 2024-03-11 PROCEDURE — 3078F DIAST BP <80 MM HG: CPT | Performed by: INTERNAL MEDICINE

## 2024-03-11 PROCEDURE — 1160F RVW MEDS BY RX/DR IN RCRD: CPT | Performed by: INTERNAL MEDICINE

## 2024-03-11 PROCEDURE — 1125F AMNT PAIN NOTED PAIN PRSNT: CPT | Performed by: INTERNAL MEDICINE

## 2024-03-11 PROCEDURE — 83036 HEMOGLOBIN GLYCOSYLATED A1C: CPT

## 2024-03-11 PROCEDURE — 80061 LIPID PANEL: CPT

## 2024-03-11 PROCEDURE — 1036F TOBACCO NON-USER: CPT | Performed by: INTERNAL MEDICINE

## 2024-03-11 PROCEDURE — 1159F MED LIST DOCD IN RCRD: CPT | Performed by: INTERNAL MEDICINE

## 2024-03-11 PROCEDURE — 3074F SYST BP LT 130 MM HG: CPT | Performed by: INTERNAL MEDICINE

## 2024-03-11 PROCEDURE — 80053 COMPREHEN METABOLIC PANEL: CPT

## 2024-03-11 PROCEDURE — 99214 OFFICE O/P EST MOD 30 MIN: CPT | Performed by: INTERNAL MEDICINE

## 2024-03-11 PROCEDURE — 36415 COLL VENOUS BLD VENIPUNCTURE: CPT

## 2024-03-11 RX ORDER — INSULIN GLARGINE 300 [IU]/ML
26 INJECTION, SOLUTION SUBCUTANEOUS EVERY MORNING
Qty: 7.8 ML | Refills: 3 | Status: SHIPPED | OUTPATIENT
Start: 2024-03-11 | End: 2025-03-11

## 2024-03-11 RX ORDER — PEN NEEDLE, DIABETIC 30 GX3/16"
NEEDLE, DISPOSABLE MISCELLANEOUS
Qty: 100 EACH | Refills: 3 | Status: SHIPPED | OUTPATIENT
Start: 2024-03-11

## 2024-03-11 ASSESSMENT — ENCOUNTER SYMPTOMS
LIGHT-HEADEDNESS: 0
SHORTNESS OF BREATH: 0
DIARRHEA: 0
DIZZINESS: 0
NAUSEA: 0
VOMITING: 0
FEVER: 0
CHILLS: 0

## 2024-03-11 NOTE — PROGRESS NOTES
Endocrinology: Follow up visit  Subjective   Patient ID: Ha Payne is a 69 y.o. male who presents for No chief complaint on file..    PCP: Cali Issa MD    HPI  Since last visit sugars look pretty good.   No recent serious lows.   Per wife still eating junk at night on/off.   Refuses sglt2, off metformin due to gi intolerance    Checks sugars 4x a day  Injects insulin 1x a day  Currently utilizing cgm to check sugars      Review of Systems   Constitutional:  Negative for chills and fever.   Respiratory:  Negative for shortness of breath.    Gastrointestinal:  Negative for diarrhea, nausea and vomiting.   Endocrine: Negative for cold intolerance and heat intolerance.   Neurological:  Negative for dizziness and light-headedness.       Patient Active Problem List   Diagnosis    Bilateral tinnitus    CAD (coronary artery disease)    Cardiac defibrillator in place    Cellulitis    Cervicalgia    Chronic congestive heart failure (CMS/HCC)    Diabetes mellitus (CMS/HCC)    Diabetic amyotrophy associated with type 2 diabetes mellitus (CMS/HCC)    Drug rash    Dyshidrotic eczema    Dysuria    ED (erectile dysfunction)    Edema    Elevated LDL cholesterol level    Fever    Generalized neuropathy    H/O non-ST elevation myocardial infarction (NSTEMI)    Hematuria    Hypertension    Insomnia    Lower leg edema    Lumbosacral spondylosis with radiculopathy    Meningioma (CMS/HCC)    Muscle pain    Nephrolithiasis    Cervical spinal stenosis    Osteoarthritis of spine with radiculopathy, cervical region    Other chronic pain    Syncope    Ventricular tachycardia (CMS/HCC)    Weakness of left hand    Spinal stenosis of lumbar region with neurogenic claudication    Left ventricular apical thrombus    History of myocardial infarction        Home Meds:  Current Outpatient Medications   Medication Instructions    acetaminophen (Tylenol) 325 mg tablet 1-2 tablets, oral, Every 4 hours PRN    aspirin 81 mg chewable tablet 1  "tablet, oral, Every 24 hours    BD Ultra-Fine Mini Pen Needle 31 gauge x 3/16\" needle 1 each    carvedilol (COREG) 25 mg, oral, 2 times daily with meals    cholecalciferol (Vitamin D-3) 25 MCG (1000 UT) capsule 1 capsule, oral, Daily    Entresto  mg tablet 1 tablet, oral, 2 times daily    eplerenone (INSPRA) 50 mg, oral, Daily    ezetimibe-simvastatin (Vytorin) 10-40 mg tablet 1 tablet, oral, Nightly    famotidine (Pepcid) 20 mg tablet 1 tablet, oral, Daily    glipiZIDE (Glucotrol) 5 mg tablet 2 tablets in am and 1 tablet in qpm    HYDROcodone-acetaminophen (Norco) 7.5-325 mg tablet 1 tablet, oral, Every 6 hours PRN    HYDROcodone-acetaminophen (Norco) 7.5-325 mg tablet 1 tablet, oral, Every 6 hours PRN    insulin detemir (LEVEMIR) 26 Units, subcutaneous, Every morning, inject 20 units in the morning and 30 units in the evening    loperamide (Imodium A-D) 2 mg capsule 1 capsule, oral, 4 times daily    metFORMIN XR (GLUCOPHAGE-XR) 500 mg, oral, Daily with evening meal    nitroglycerin (Nitrostat) 0.4 mg SL tablet 1 tablet, sublingual, Every 5 min PRN    pen needle, diabetic (BD ULTRA-FINE MINI PEN NEEDLE MISC) 1 Stick, miscellaneous, 2 times daily    pregabalin (LYRICA) 200 mg, oral, 3 times daily    torsemide (DEMADEX) 20 mg, oral, Daily        Allergies   Allergen Reactions    Amoxicillin-Pot Clavulanate Swelling and Unknown    Prochlorperazine Unknown and Other     Pt states he has spasms.    Brilinta [Ticagrelor] Rash        Objective   Vitals:    03/11/24 0939   BP: 120/70   Pulse: 80   Resp: 16      Vitals:    03/11/24 0939   Weight: 89.8 kg (198 lb)      Body mass index is 26.85 kg/m².   Physical Exam  Constitutional:       Appearance: Normal appearance. He is overweight.   HENT:      Head: Normocephalic and atraumatic.   Neck:      Thyroid: No thyroid mass, thyromegaly or thyroid tenderness.   Cardiovascular:      Rate and Rhythm: Normal rate and regular rhythm.      Heart sounds: No murmur heard.     " "No gallop.   Pulmonary:      Effort: Pulmonary effort is normal.      Breath sounds: Normal breath sounds.   Abdominal:      Palpations: Abdomen is soft.      Comments: benign   Neurological:      General: No focal deficit present.      Mental Status: He is alert and oriented to person, place, and time.      Deep Tendon Reflexes: Reflexes are normal and symmetric.   Psychiatric:         Behavior: Behavior is cooperative.         Labs:  Lab Results   Component Value Date    HGBA1C 7.8 (A) 09/19/2023    TSH 1.86 07/09/2022      No results found for: \"PR1\", \"THYROIDPAB\", \"TSI\"     Assessment/Plan   Problem List Items Addressed This Visit       Diabetes mellitus (CMS/[facility]) - Primary    Relevant Orders    Comprehensive Metabolic Panel    CBC    Lipid Panel    Hemoglobin A1C    Albumin , Urine Random    Elevated LDL cholesterol level    Hypertension   Dm2:  Sugars decent  No changes for now  Work on eating  Refuses additional meds  Htn:  Bp excellent  Lipids:  Due for recheck  Seeing podiatry regularly, due to see optho      Electronically signed by:  Amelie Richardson MD 03/11/24 10:28 AM              "

## 2024-03-12 ENCOUNTER — LAB (OUTPATIENT)
Dept: LAB | Facility: LAB | Age: 69
End: 2024-03-12
Payer: MEDICARE

## 2024-03-12 DIAGNOSIS — Z79.4 TYPE 2 DIABETES MELLITUS WITHOUT COMPLICATION, WITH LONG-TERM CURRENT USE OF INSULIN (MULTI): ICD-10-CM

## 2024-03-12 DIAGNOSIS — E11.9 TYPE 2 DIABETES MELLITUS WITHOUT COMPLICATION, WITH LONG-TERM CURRENT USE OF INSULIN (MULTI): ICD-10-CM

## 2024-03-12 LAB
CREAT UR-MCNC: 88.3 MG/DL (ref 20–370)
MICROALBUMIN UR-MCNC: 31.6 MG/L
MICROALBUMIN/CREAT UR: 35.8 UG/MG CREAT

## 2024-03-12 PROCEDURE — 82570 ASSAY OF URINE CREATININE: CPT

## 2024-03-12 PROCEDURE — 82043 UR ALBUMIN QUANTITATIVE: CPT

## 2024-03-26 ENCOUNTER — DOCUMENTATION (OUTPATIENT)
Dept: CARDIOLOGY | Facility: CLINIC | Age: 69
End: 2024-03-26
Payer: MEDICARE

## 2024-03-26 ENCOUNTER — TELEPHONE (OUTPATIENT)
Dept: SCHEDULING | Age: 69
End: 2024-03-26
Payer: MEDICARE

## 2024-03-29 ENCOUNTER — OFFICE VISIT (OUTPATIENT)
Dept: PAIN MEDICINE | Facility: CLINIC | Age: 69
End: 2024-03-29
Payer: MEDICARE

## 2024-03-29 VITALS
DIASTOLIC BLOOD PRESSURE: 82 MMHG | BODY MASS INDEX: 26.81 KG/M2 | HEIGHT: 72 IN | WEIGHT: 197.97 LBS | SYSTOLIC BLOOD PRESSURE: 122 MMHG

## 2024-03-29 DIAGNOSIS — M48.02 CERVICAL SPINAL STENOSIS: ICD-10-CM

## 2024-03-29 DIAGNOSIS — M48.062 SPINAL STENOSIS OF LUMBAR REGION WITH NEUROGENIC CLAUDICATION: ICD-10-CM

## 2024-03-29 DIAGNOSIS — M47.27 LUMBOSACRAL SPONDYLOSIS WITH RADICULOPATHY: ICD-10-CM

## 2024-03-29 PROCEDURE — 99214 OFFICE O/P EST MOD 30 MIN: CPT | Performed by: PHYSICIAN ASSISTANT

## 2024-03-29 PROCEDURE — 3052F HG A1C>EQUAL 8.0%<EQUAL 9.0%: CPT | Performed by: PHYSICIAN ASSISTANT

## 2024-03-29 PROCEDURE — 3060F POS MICROALBUMINURIA REV: CPT | Performed by: PHYSICIAN ASSISTANT

## 2024-03-29 PROCEDURE — 3074F SYST BP LT 130 MM HG: CPT | Performed by: PHYSICIAN ASSISTANT

## 2024-03-29 PROCEDURE — 3048F LDL-C <100 MG/DL: CPT | Performed by: PHYSICIAN ASSISTANT

## 2024-03-29 PROCEDURE — 1036F TOBACCO NON-USER: CPT | Performed by: PHYSICIAN ASSISTANT

## 2024-03-29 PROCEDURE — 1159F MED LIST DOCD IN RCRD: CPT | Performed by: PHYSICIAN ASSISTANT

## 2024-03-29 PROCEDURE — 1160F RVW MEDS BY RX/DR IN RCRD: CPT | Performed by: PHYSICIAN ASSISTANT

## 2024-03-29 PROCEDURE — 3079F DIAST BP 80-89 MM HG: CPT | Performed by: PHYSICIAN ASSISTANT

## 2024-03-29 PROCEDURE — 1125F AMNT PAIN NOTED PAIN PRSNT: CPT | Performed by: PHYSICIAN ASSISTANT

## 2024-03-29 RX ORDER — PREGABALIN 200 MG/1
200 CAPSULE ORAL 3 TIMES DAILY
Qty: 90 CAPSULE | Refills: 2 | Status: SHIPPED | OUTPATIENT
Start: 2024-03-29 | End: 2024-06-27

## 2024-03-29 RX ORDER — HYDROCODONE BITARTRATE AND ACETAMINOPHEN 7.5; 325 MG/1; MG/1
1 TABLET ORAL EVERY 6 HOURS PRN
Qty: 120 TABLET | Refills: 0 | Status: SHIPPED | OUTPATIENT
Start: 2024-03-29 | End: 2024-05-20 | Stop reason: WASHOUT

## 2024-03-29 RX ORDER — HYDROCODONE BITARTRATE AND ACETAMINOPHEN 7.5; 325 MG/1; MG/1
1 TABLET ORAL EVERY 6 HOURS PRN
Qty: 120 TABLET | Refills: 0 | Status: SHIPPED | OUTPATIENT
Start: 2024-03-29 | End: 2024-05-30 | Stop reason: SDUPTHER

## 2024-03-29 ASSESSMENT — ENCOUNTER SYMPTOMS
NAUSEA: 0
NECK PAIN: 1
ARTHRALGIAS: 1
UNEXPECTED WEIGHT CHANGE: 0
VOMITING: 0
SLEEP DISTURBANCE: 0
FATIGUE: 0
PALPITATIONS: 0
FEVER: 0
CHILLS: 0
COUGH: 0
BACK PAIN: 1
ACTIVITY CHANGE: 0
DIARRHEA: 0
VOICE CHANGE: 0
WHEEZING: 0
SHORTNESS OF BREATH: 0

## 2024-03-29 ASSESSMENT — PATIENT HEALTH QUESTIONNAIRE - PHQ9
2. FEELING DOWN, DEPRESSED OR HOPELESS: NOT AT ALL
SUM OF ALL RESPONSES TO PHQ9 QUESTIONS 1 AND 2: 0
1. LITTLE INTEREST OR PLEASURE IN DOING THINGS: NOT AT ALL

## 2024-03-29 ASSESSMENT — PAIN - FUNCTIONAL ASSESSMENT: PAIN_FUNCTIONAL_ASSESSMENT: 0-10

## 2024-03-29 ASSESSMENT — PAIN SCALES - GENERAL: PAINLEVEL_OUTOF10: 8

## 2024-03-29 NOTE — PROGRESS NOTES
Subjective   Patient ID: Ha Payne is a 69 y.o. male who presents for Back Pain, Neck Pain, and Arthritis.  Patient is a 69-year-old male with cervical spinal stenosis spinal stenosis the lumbar region with neurogenic claudication and spondylosis the presents today for follow-up.  Patient does note that he is still in counseling.  His wife is here to help provide historical information.  Patient has been told by his endocrinologist he needs to have better glycemic control.  Patient's wife does report he does eat a lot of food during the evening hours.  Patient has been having some increased burning sensation this is generally predicated on when he is feeling better the subsequent day he does more activities and causes aggravation to his condition.  Denies any falling or trauma in the past 60 days.    Back Pain  Pertinent negatives include no chest pain or fever.   Neck Pain   Pertinent negatives include no chest pain or fever.   Arthritis  Pertinent negatives include no diarrhea, fatigue or fever.       Review of Systems   Constitutional:  Negative for activity change, chills, fatigue, fever and unexpected weight change.   HENT:  Negative for ear pain and voice change.    Eyes:  Negative for visual disturbance.   Respiratory:  Negative for cough, shortness of breath and wheezing.    Cardiovascular:  Negative for chest pain and palpitations.   Gastrointestinal:  Negative for diarrhea, nausea and vomiting.   Musculoskeletal:  Positive for arthralgias, arthritis, back pain, gait problem and neck pain.   Psychiatric/Behavioral:  Negative for behavioral problems, self-injury, sleep disturbance and suicidal ideas.        Objective   Physical Exam  Vitals reviewed.   Constitutional:       Appearance: Normal appearance.   HENT:      Head: Normocephalic and atraumatic.      Mouth/Throat:      Mouth: Mucous membranes are moist.   Neck:      Vascular: No JVD.   Pulmonary:      Effort: Pulmonary effort is normal. No  tachypnea or bradypnea.   Abdominal:      Palpations: Abdomen is soft.   Musculoskeletal:      Cervical back: Tenderness present. No spasms. Decreased range of motion.      Lumbar back: Spasms and tenderness present. Decreased range of motion. Positive right straight leg raise test and positive left straight leg raise test.      Comments: Hypoestheisa BLE and single point cane for amb.   Skin:     General: Skin is warm and dry.   Neurological:      Mental Status: He is alert and oriented to person, place, and time.   Psychiatric:         Mood and Affect: Mood normal.         Behavior: Behavior normal. Behavior is cooperative.       Assessment/Plan   Problem List Items Addressed This Visit             ICD-10-CM    Lumbosacral spondylosis with radiculopathy M47.27    Cervical spinal stenosis M48.02    Spinal stenosis of lumbar region with neurogenic claudication M48.062   I had nice discussion with the patient today our plan will be as follows.      Radiology: [ none at this time ]      Physically:  [ continue modification of activities, healthy lifestyle choice, healthier diet choices recommended.  Could be related to the burning sensation also physical activity could be causing aggravation continue to use assistive devices]      Psychologically:  [ No acute psychological concerns, continue counseling]      Medication: [I will refill the medications at the same dose and frequency. We will continue to monitor the patient bimonthly for compliance, adverse reaction or interations The patient continues to see benefit and improvement in their quality of life and ability to maintain ADLs. Patient educated about the risks of taking opioids and operating a motor vehicle. Patient reports no adverse side effects to current medication regimen. Current regimen does allow patient to maintain ADLs. Oarrs has been reviewed. No suspicion of diversion or abuse. Compliance with medication regime, no use of illicit drugs, no sharing of  narcotic medications with others, do not use others narcotic medication, and to avoid alcohol use. Patient has been educated on the risks, benefits, and alternatives of controlled substances as well as the proper way to store these medications.   The patient and I discussed the nature of this medication and its side effects. We discussed tolerance, physical dependence, psychological dependence, addiction and opioid-induced hyperalgesia ]      Duration:  [ 2 months ]      Intervention:  [ none at this time. Patient is stable.  ]           Best Gonzalez PA-C 03/29/24 9:43 AM

## 2024-03-29 NOTE — PROGRESS NOTES
MEDICATION NAME: hydrocodone,acetaminophen  STRENGTH: 7.5 mg  LAST FILL DATE: 3/01  DATE LAST TAKEN: 3/29  QUANTITY FILLED: 117;3  QUANTITY REMAININ  COUNT COMPLETED BY: TRENTON RINCON and INGRID Bains      CONTROLLED SUBSTANCES AGREEMENT LAST SIGNED: 2023  ORT LAST COMPLETED:10/2023  Modified [employer] disability form filled out annually.

## 2024-04-08 DIAGNOSIS — Z79.4 TYPE 2 DIABETES MELLITUS WITHOUT COMPLICATION, WITH LONG-TERM CURRENT USE OF INSULIN (MULTI): Primary | ICD-10-CM

## 2024-04-08 DIAGNOSIS — E11.9 TYPE 2 DIABETES MELLITUS WITHOUT COMPLICATION, WITH LONG-TERM CURRENT USE OF INSULIN (MULTI): Primary | ICD-10-CM

## 2024-04-08 RX ORDER — PEN NEEDLE, DIABETIC 30 GX3/16"
NEEDLE, DISPOSABLE MISCELLANEOUS
Qty: 200 EACH | Refills: 3 | Status: SHIPPED | OUTPATIENT
Start: 2024-04-08

## 2024-04-15 ENCOUNTER — APPOINTMENT (OUTPATIENT)
Dept: PRIMARY CARE | Facility: CLINIC | Age: 69
End: 2024-04-15
Payer: MEDICARE

## 2024-05-17 PROBLEM — I42.5 RESTRICTIVE CARDIOMYOPATHY (MULTI): Status: ACTIVE | Noted: 2023-12-04

## 2024-05-17 PROBLEM — D69.6 DISORDER INVOLVING THROMBOCYTOPENIA (CMS-HCC): Status: ACTIVE | Noted: 2023-10-09

## 2024-05-19 NOTE — PROGRESS NOTES
"Primary Care Physician: No Assigned PCP Generic Provider, MD  Date of Visit: 05/20/2024  9:40 AM EDT  [place] [facility] [place] [address]   Last office visit: 11/16/2023     Chief Complaint:     CAD (6-month)    HPI/Summary  Ha Payne is a 69 y.o. male who presents for followup cardiology evaluation.     The patient presents for 6-month reevaluation.  He was hospitalized in July, 2022 for management of acute decompensated heart failure, likely secondary to medication noncompliance.     The patient is more than 20 years status post myocardial infarction.  He has undergone several coronary interventions.  In 2016, we identified an apical mural thrombus with severe LV dysfunction, and prescribed a course of anticoagulation.  He also developed some \"clumsiness\" involving the left hand, which was self-limited, but he preferred to discontinue warfarin anticoagulation.  In October, 2019 he presented to the emergency room with ACS but declined hospitalization.  He has also declined treatment with an SGLT2 inhibitor.  He is limited by severe chronic back pain and his care has been somewhat compromised by medication noncompliance.  The last echocardiogram was on July 8, 2022.  The ejection fraction was 30 to 35% with global hypokinesis.  The left ventricle was dilated.  No thrombus.  No significant changes compared to February 6, 2021.  The last device check on March 4, 2024 showed no atrial or ventricular arrhythmia, and about 1.5-year of battery life.  No device discharges.     He remains on aspirin, carvedilol, Entresto, eplerenone, ezetimibe, simvastatin, and torsemide.  Diabetes regimen includes glipizide and Toujeo.  On several occasions, he has declined recommended SGLT2 inhibitor therapy.  Metformin withdrawn secondary to GI symptoms.     He is about the same as previously.  Occasionally, he takes an extra torsemide.  He is not follow a particularly salt restricted diet.  He is mainly limited by his chronic low " back pain and he ambulates with a cane.  He can only walk a few 100 feet before resting.  He denies PND and orthopnea.  He recently obtained a wheelchair, and is now using a wheelchair when out of the home.  At home, he uses a cane or furniture walks.  No falls.           Specialty Problems          Cardiology Problems    CAD (coronary artery disease)    Cardiac defibrillator in place     2005         Chronic congestive heart failure (Multi)     September, 2012: EF 35-40.  April, 2016: EF 30.  October, 2019: EF 25.  February, 2021: EF 35.  July, 2022: EF 30-35.         Elevated LDL cholesterol level    H/O non-ST elevation myocardial infarction (NSTEMI)     October, 2019         Hypertension    Ventricular tachycardia (Multi)    Disorder involving thrombocytopenia (CMS-HCC)    History of myocardial infarction      Myocardial infarction 1995. Reinfarction 1998. November 21, 2012: Status post PCI to a      severe stenosis in the inferior limb of the obtuse marginal circumflex. Mild diffuse left      main, chronic total occlusion of LAD, collaterals from distal RCA to LAD, 60% first      marginal, 90% second marginal, 30% mid RCA, 40% in-stent restenosis of RCA.         Left ventricular apical thrombus     Apical mural thrombus         Restrictive cardiomyopathy (Multi)        Past Medical History:   Diagnosis Date    Acute ischemic heart disease, unspecified (Multi) 06/27/2022    Acute coronary syndrome    Back pain     Intracardiac thrombosis, not elsewhere classified 08/12/2022    Apical mural thrombus    Old myocardial infarction 08/12/2022    History of acute myocardial infarction    Opioid dependence, uncomplicated (Multi) 10/03/2013    Opioid dependence    Other conditions influencing health status 04/17/2013    Disorder Of The Pelvis / Hip Joint / Femur    Other conditions influencing health status 04/17/2013    Toxicity From Drugs, Medicaments, Or Biological Substances          No past surgical history on  "file.         Social History     Tobacco Use    Smoking status: Former     Current packs/day: 0.00     Types: Cigarettes     Quit date: 1995     Years since quittin.4    Smokeless tobacco: Never   Vaping Use    Vaping status: Never Used   Substance Use Topics    Alcohol use: Never    Drug use: Never        Physical Activity: Not on file            Allergies   Allergen Reactions    Amoxicillin-Pot Clavulanate Swelling and Unknown    Prochlorperazine Unknown and Other     Pt states he has spasms.    Brilinta [Ticagrelor] Rash         Current Outpatient Medications   Medication Instructions    acetaminophen (Tylenol) 325 mg tablet 1-2 tablets, oral, Every 4 hours PRN    aspirin 81 mg chewable tablet 1 tablet, oral, Every 24 hours    BD Ultra-Fine Mini Pen Needle 31 gauge x 316\" needle 1 each    carvedilol (COREG) 25 mg, oral, 2 times daily (morning and late afternoon)    cholecalciferol (Vitamin D-3) 25 MCG (1000 UT) capsule 1 capsule, oral, Daily    Entresto  mg tablet 1 tablet, oral, 2 times daily    eplerenone (INSPRA) 50 mg, oral, Daily    ezetimibe-simvastatin (Vytorin) 10-40 mg tablet 1 tablet, oral, Nightly    famotidine (Pepcid) 20 mg tablet 1 tablet, oral, Daily    glipiZIDE (GLUCOTROL) 15 mg, oral, Daily, 2 tablets in am and 1 tablet in qpm    HYDROcodone-acetaminophen (Norco) 7.5-325 mg tablet 1 tablet, oral, Every 6 hours PRN    insulin glargine (TOUJEO SOLOSTAR- 1 UNIT DIAL) 26 Units, subcutaneous, Every morning, Take as directed per insulin instructions.    loperamide (Imodium A-D) 2 mg capsule 1 capsule, oral, 4 times daily    nitroglycerin (Nitrostat) 0.4 mg SL tablet 1 tablet, sublingual, Every 5 min PRN    pen needle, diabetic (BD Doreen 2nd Gen Pen Needle) 32 gauge x \" needle Use 1x a day    pen needle, diabetic (BD ULTRA-FINE MINI PEN NEEDLE MISC) 1 Stick, miscellaneous, 2 times daily    pen needle, diabetic (TechLITE Pen Needle) 31 gauge x 5/16\" needle Use twice daily with insulin "    pregabalin (LYRICA) 200 mg, oral, 3 times daily    torsemide (DEMADEX) 20 mg, oral, Daily       ROS     Vital Signs:  Vitals:    05/20/24 0956   BP: 111/67   BP Location: Right arm   Patient Position: Sitting   BP Cuff Size: Adult   Pulse: 80   Weight: 84.9 kg (187 lb 1.6 oz)   Height: 1.829 m (6')     Wt Readings from Last 2 Encounters:   05/20/24 84.9 kg (187 lb 1.6 oz)   03/29/24 89.8 kg (197 lb 15.6 oz)     Body mass index is 25.38 kg/m².       Physical Exam:    Brief evaluation disclosed that he was not in severe pain today.  There were inspiratory rales at both bases, new since the last visit.  Heart sounds were regular, no murmurs appreciated.  No significant lower extremity edema.  Otherwise, he was not examined.     Last Labs:  CMP:  Recent Labs     03/11/24  1036 09/19/23  1405 03/24/23  0832 11/08/22  0958 07/10/22  1816    142 141 140 139   K 4.1 4.7 4.2 4.6 4.7   CL 99 104 104 103 98   CO2 32 29 29 28 28   ANIONGAP 14 14 12 14 18   BUN 25* 21 22 14 32*   CREATININE 0.98 0.99 1.04 0.79 0.92   EGFR 83  --   --   --   --    GLUCOSE 132* 133* 104* 124* 146*     Recent Labs     03/11/24  1036 09/19/23  1405 03/24/23  0832 11/08/22  0958 07/10/22  1816 07/08/22  1507 07/08/22  0700   ALBUMIN 4.3 4.2 4.1 4.1 4.2   < > 4.3   ALKPHOS 100 66 83 62  --   --  77   ALT 9* 22 6* 5*  --   --  4*   AST 12 25 9 10  --   --  11   BILITOT 0.9 0.6 0.8 0.8  --   --  1.1    < > = values in this interval not displayed.     CBC:  Recent Labs     03/11/24  1036 11/06/23  1159 10/09/23  1027 09/19/23  1405 03/24/23  0832   WBC 7.3 5.8 7.4 9.2 8.3   HGB 14.7 15.0 16.7 16.3 12.9*   HCT 46.3 47.9 52.5* 52.1* 40.9*   PLT 91* 103* 86* 87* 97*   MCV 92 99 95 95 90     COAG:   Recent Labs     07/07/22  1603 09/09/21  0549 02/08/21  0815 02/05/21  2152 10/27/19  1750   INR 1.2* 1.0 1.1 1.3* 1.2*     HEME/ENDO:  Recent Labs     03/11/24  1036 11/06/23  1159 09/19/23  1405 03/24/23  0832 11/08/22  0958 07/09/22  1908  07/08/22  0700 12/09/21  1110 09/09/21  0549 08/16/21  1623 02/05/21  2152   FERRITIN  --  108  --   --   --   --   --   --   --   --   --    IRONSAT  --  11*  --   --   --   --   --   --   --   --   --    TSH  --   --   --   --   --  1.86 0.65  --  2.19  --  1.05   HGBA1C 8.2*  --  7.8* 7.5* 7.1* 6.8*  --    < >  --    < >  --     < > = values in this interval not displayed.      CARDIAC:   Recent Labs     03/24/23  0832 07/07/22  1945 07/07/22  1722 07/07/22  1603 09/09/21  0549 04/23/21  1015 02/05/21  0743   TROPHS  --  11 13 15  --   --   --    BNP 1,192*  --   --  1,263* 113* 295* 978*     Recent Labs     03/11/24  1036 03/24/23  0832 05/11/22  0904 02/06/21  1849 10/13/20  0840   CHOL 89 90 112  --  129   LDLF  --  46 58  --  57   HDL 29.0 26.8* 29.2*  --  39.2*   TRIG 99 86 124   < > 163*    < > = values in this interval not displayed.       Last Cardiology Tests:    ECG:    The previous tracing dated November 16, 2023 was reviewed today: Sinus rhythm with first-degree AV block.  RI interval 222 ms.  Left atrial enlargement.  Leftward axis.  Inferior and anterolateral infarction age undetermined.    Echo:  Echo Results:  No results found for this or any previous visit from the past 3650 days.       Cath:      Stress Test:  Stress Results:  No results found for this or any previous visit from the past 365 days.         Cardiac Imaging:        Assessment/Plan     The patient's complex problem list was again reviewed.  We reviewed several hospitalizations in 2022 in 2023, and we reviewed the 2022 echocardiogram with the patient and with his wife.  Again, he declined additional medication including an SGLT2 inhibitor.  We reviewed the recent endocrinology note.  No need for another echocardiogram, since the ejection fraction has been in a similar range for many years.  He will likely require generator change out in 1.5 years.  Fortunately, no arrhythmias noted.  He is not currently on anticoagulation.  We  renewed prescriptions, discussed the indications for sublingual nitroglycerin, and requested another BNP with his next blood draw.  6-month follow-up.  The patient will need to identify a new primary care provider, before the next office visit.      Orders:  Orders Placed This Encounter   Procedures    B-Type Natriuretic Peptide      Followup Appts:  Future Appointments   Date Time Provider Department Center   5/30/2024  9:15 AM Best Gonzalez PA-C [id number]           ____________________________________________________________  Burt Larsen MD    Senior Attending Physician  [facility]  [facility]    [facility] Chair for Cardiovascular Excellence  [employer]

## 2024-05-20 ENCOUNTER — OFFICE VISIT (OUTPATIENT)
Dept: CARDIOLOGY | Facility: CLINIC | Age: 69
End: 2024-05-20
Payer: MEDICARE

## 2024-05-20 VITALS
BODY MASS INDEX: 25.34 KG/M2 | HEIGHT: 72 IN | SYSTOLIC BLOOD PRESSURE: 111 MMHG | HEART RATE: 80 BPM | DIASTOLIC BLOOD PRESSURE: 67 MMHG | WEIGHT: 187.1 LBS

## 2024-05-20 DIAGNOSIS — I50.22 CHRONIC SYSTOLIC CONGESTIVE HEART FAILURE (MULTI): Primary | ICD-10-CM

## 2024-05-20 DIAGNOSIS — E11.9 TYPE 2 DIABETES MELLITUS WITHOUT COMPLICATION, WITH LONG-TERM CURRENT USE OF INSULIN (MULTI): ICD-10-CM

## 2024-05-20 DIAGNOSIS — Z79.4 TYPE 2 DIABETES MELLITUS WITHOUT COMPLICATION, WITH LONG-TERM CURRENT USE OF INSULIN (MULTI): ICD-10-CM

## 2024-05-20 PROCEDURE — G2211 COMPLEX E/M VISIT ADD ON: HCPCS | Performed by: INTERNAL MEDICINE

## 2024-05-20 PROCEDURE — 99215 OFFICE O/P EST HI 40 MIN: CPT | Performed by: INTERNAL MEDICINE

## 2024-05-20 PROCEDURE — 1160F RVW MEDS BY RX/DR IN RCRD: CPT | Performed by: INTERNAL MEDICINE

## 2024-05-20 PROCEDURE — 1125F AMNT PAIN NOTED PAIN PRSNT: CPT | Performed by: INTERNAL MEDICINE

## 2024-05-20 PROCEDURE — 3078F DIAST BP <80 MM HG: CPT | Performed by: INTERNAL MEDICINE

## 2024-05-20 PROCEDURE — 3048F LDL-C <100 MG/DL: CPT | Performed by: INTERNAL MEDICINE

## 2024-05-20 PROCEDURE — 3074F SYST BP LT 130 MM HG: CPT | Performed by: INTERNAL MEDICINE

## 2024-05-20 PROCEDURE — 1159F MED LIST DOCD IN RCRD: CPT | Performed by: INTERNAL MEDICINE

## 2024-05-20 PROCEDURE — 3052F HG A1C>EQUAL 8.0%<EQUAL 9.0%: CPT | Performed by: INTERNAL MEDICINE

## 2024-05-20 PROCEDURE — 1036F TOBACCO NON-USER: CPT | Performed by: INTERNAL MEDICINE

## 2024-05-20 PROCEDURE — 3060F POS MICROALBUMINURIA REV: CPT | Performed by: INTERNAL MEDICINE

## 2024-05-20 RX ORDER — GLIPIZIDE 5 MG/1
15 TABLET ORAL DAILY
Qty: 270 TABLET | Refills: 3 | Status: SHIPPED | OUTPATIENT
Start: 2024-05-20

## 2024-05-20 ASSESSMENT — PATIENT HEALTH QUESTIONNAIRE - PHQ9
2. FEELING DOWN, DEPRESSED OR HOPELESS: NOT AT ALL
1. LITTLE INTEREST OR PLEASURE IN DOING THINGS: NOT AT ALL
SUM OF ALL RESPONSES TO PHQ9 QUESTIONS 1 AND 2: 0

## 2024-05-20 ASSESSMENT — ENCOUNTER SYMPTOMS
OCCASIONAL FEELINGS OF UNSTEADINESS: 1
LOSS OF SENSATION IN FEET: 1
DEPRESSION: 0

## 2024-05-20 ASSESSMENT — PAIN SCALES - GENERAL: PAINLEVEL: 7

## 2024-05-20 NOTE — PATIENT INSTRUCTIONS
You can take an extra dose of torsemide if you notice lower extremity swelling or if you notice any increase in shortness of breath.  Stay on all the medications listed.  Call us with any questions.  6-month follow-up.  Close follow-up with endocrinology as well.  Please identify a new primary care provider.

## 2024-05-24 ENCOUNTER — TELEPHONE (OUTPATIENT)
Dept: SCHEDULING | Age: 69
End: 2024-05-24
Payer: MEDICARE

## 2024-05-24 DIAGNOSIS — I50.22 CHRONIC SYSTOLIC CONGESTIVE HEART FAILURE (MULTI): ICD-10-CM

## 2024-05-24 DIAGNOSIS — I25.10 CORONARY ARTERY DISEASE, UNSPECIFIED VESSEL OR LESION TYPE, UNSPECIFIED WHETHER ANGINA PRESENT, UNSPECIFIED WHETHER NATIVE OR TRANSPLANTED HEART: Primary | ICD-10-CM

## 2024-05-24 RX ORDER — NITROGLYCERIN 0.4 MG/1
0.4 TABLET SUBLINGUAL EVERY 5 MIN PRN
Qty: 25 TABLET | Refills: 3 | Status: SHIPPED | OUTPATIENT
Start: 2024-05-24

## 2024-05-24 RX ORDER — SACUBITRIL AND VALSARTAN 97; 103 MG/1; MG/1
1 TABLET, FILM COATED ORAL 2 TIMES DAILY
Qty: 180 TABLET | Refills: 3 | Status: SHIPPED | OUTPATIENT
Start: 2024-05-24

## 2024-05-30 ENCOUNTER — OFFICE VISIT (OUTPATIENT)
Dept: PAIN MEDICINE | Facility: CLINIC | Age: 69
End: 2024-05-30
Payer: MEDICARE

## 2024-05-30 VITALS
DIASTOLIC BLOOD PRESSURE: 76 MMHG | OXYGEN SATURATION: 99 % | HEIGHT: 72 IN | BODY MASS INDEX: 25.35 KG/M2 | WEIGHT: 187.17 LBS | SYSTOLIC BLOOD PRESSURE: 124 MMHG | HEART RATE: 85 BPM

## 2024-05-30 DIAGNOSIS — M48.062 SPINAL STENOSIS OF LUMBAR REGION WITH NEUROGENIC CLAUDICATION: ICD-10-CM

## 2024-05-30 DIAGNOSIS — M48.02 CERVICAL SPINAL STENOSIS: ICD-10-CM

## 2024-05-30 DIAGNOSIS — M47.27 LUMBOSACRAL SPONDYLOSIS WITH RADICULOPATHY: ICD-10-CM

## 2024-05-30 DIAGNOSIS — M25.512 ACUTE PAIN OF LEFT SHOULDER: ICD-10-CM
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
## 2024-05-30 DIAGNOSIS — M54.2 NECK PAIN: Primary | ICD-10-CM

## 2024-05-30 PROCEDURE — 1160F RVW MEDS BY RX/DR IN RCRD: CPT | Performed by: PHYSICIAN ASSISTANT

## 2024-05-30 PROCEDURE — 1125F AMNT PAIN NOTED PAIN PRSNT: CPT | Performed by: PHYSICIAN ASSISTANT

## 2024-05-30 PROCEDURE — 3052F HG A1C>EQUAL 8.0%<EQUAL 9.0%: CPT | Performed by: PHYSICIAN ASSISTANT

## 2024-05-30 PROCEDURE — 3060F POS MICROALBUMINURIA REV: CPT | Performed by: PHYSICIAN ASSISTANT

## 2024-05-30 PROCEDURE — 3074F SYST BP LT 130 MM HG: CPT | Performed by: PHYSICIAN ASSISTANT

## 2024-05-30 PROCEDURE — 99214 OFFICE O/P EST MOD 30 MIN: CPT | Performed by: PHYSICIAN ASSISTANT

## 2024-05-30 PROCEDURE — 3078F DIAST BP <80 MM HG: CPT | Performed by: PHYSICIAN ASSISTANT

## 2024-05-30 PROCEDURE — 1159F MED LIST DOCD IN RCRD: CPT | Performed by: PHYSICIAN ASSISTANT

## 2024-05-30 PROCEDURE — 3048F LDL-C <100 MG/DL: CPT | Performed by: PHYSICIAN ASSISTANT

## 2024-05-30 PROCEDURE — 1036F TOBACCO NON-USER: CPT | Performed by: PHYSICIAN ASSISTANT

## 2024-05-30 RX ORDER — HYDROCODONE BITARTRATE AND ACETAMINOPHEN 7.5; 325 MG/1; MG/1
1 TABLET ORAL EVERY 6 HOURS PRN
Qty: 120 TABLET | Refills: 0 | Status: SHIPPED | OUTPATIENT
Start: 2024-05-30 | End: 2024-06-29

## 2024-05-30 ASSESSMENT — ENCOUNTER SYMPTOMS
PALPITATIONS: 0
DIARRHEA: 0
ARTHRALGIAS: 1
ACTIVITY CHANGE: 0
BACK PAIN: 1
CHILLS: 0
WHEEZING: 0
FEVER: 0
VOICE CHANGE: 0
SLEEP DISTURBANCE: 0
VOMITING: 0
UNEXPECTED WEIGHT CHANGE: 0
NAUSEA: 0
FATIGUE: 0
COUGH: 0
SHORTNESS OF BREATH: 0
NECK PAIN: 1

## 2024-05-30 ASSESSMENT — PAIN - FUNCTIONAL ASSESSMENT: PAIN_FUNCTIONAL_ASSESSMENT: 0-10

## 2024-05-30 ASSESSMENT — PAIN DESCRIPTION - DESCRIPTORS: DESCRIPTORS: SHARP;SHOOTING;NUMBNESS

## 2024-05-30 ASSESSMENT — PAIN SCALES - GENERAL: PAINLEVEL_OUTOF10: 9

## 2024-05-30 NOTE — PROGRESS NOTES
MEDICATION NAME: norco   STRENGTH: 7.5 mg   LAST FILL DATE: 2024  DATE LAST TAKEN: 2024  QUANTITY FILLED: 120  QUANTITY REMAININ  COUNT COMPLETED BY: TRENTON RINCON and INGRID Bains    CONTROLLED SUBSTANCES AGREEMENT LAST SIGNED: 2023  ORT LAST COMPLETED: 10/2023  Modified Oswestry disability form filled out annually.

## 2024-05-30 NOTE — PROGRESS NOTES
Subjective   Patient ID: Ha Payne is a 69 y.o. male who presents for Back Pain and Neck Pain.  Patient is a 69-year-old male with spinal stenosis with neurogenic claudication and radiculopathy in the lower extremities cervical stenosis and recent fall with new onset of neck and left shoulder pain.  Patient denies loss of consciousness.  This happened approximately 20 days ago he lost balance and impacted himself.  Still states that it has been slowly getting better he still continues to have some increased deficits in the left shoulder pain that radiates from shoulder up to the neck is been using this left upper extremity less he is right-hand dominant.  He continues to follow with his cardiologist that works with various different things he is continually involved in his therapy his wife is now gotten a wheelchair for longer ambulatory adventures    Back Pain  Pertinent negatives include no chest pain or fever.   Neck Pain   Pertinent negatives include no chest pain or fever.       Review of Systems   Constitutional:  Negative for activity change, chills, fatigue, fever and unexpected weight change.   HENT:  Negative for ear pain and voice change.    Eyes:  Negative for visual disturbance.   Respiratory:  Negative for cough, shortness of breath and wheezing.    Cardiovascular:  Negative for chest pain and palpitations.   Gastrointestinal:  Negative for diarrhea, nausea and vomiting.   Musculoskeletal:  Positive for arthralgias, back pain, gait problem and neck pain.   Psychiatric/Behavioral:  Negative for behavioral problems, self-injury, sleep disturbance and suicidal ideas.        Objective   Physical Exam  Vitals reviewed.   Constitutional:       Appearance: Normal appearance.   HENT:      Head: Normocephalic and atraumatic.      Mouth/Throat:      Mouth: Mucous membranes are moist.   Neck:      Vascular: No JVD.   Pulmonary:      Effort: Pulmonary effort is normal. No tachypnea or bradypnea.    Abdominal:      Palpations: Abdomen is soft.   Musculoskeletal:      Cervical back: Tenderness present. No spasms. Decreased range of motion.      Lumbar back: Spasms and tenderness present. Decreased range of motion. Positive right straight leg raise test and positive left straight leg raise test.      Comments: Hypoestheisa BLE and single point cane for amb.   Skin:     General: Skin is warm and dry.   Neurological:      Mental Status: He is alert and oriented to person, place, and time.   Psychiatric:         Mood and Affect: Mood normal.         Behavior: Behavior normal. Behavior is cooperative.       Assessment/Plan   Problem List Items Addressed This Visit             ICD-10-CM    Lumbosacral spondylosis with radiculopathy M47.27    Relevant Medications    HYDROcodone-acetaminophen (Norco) 7.5-325 mg tablet    HYDROcodone-acetaminophen (Norco) 7.5-325 mg tablet    Cervical spinal stenosis M48.02    Relevant Medications    HYDROcodone-acetaminophen (Norco) 7.5-325 mg tablet    HYDROcodone-acetaminophen (Norco) 7.5-325 mg tablet    Spinal stenosis of lumbar region with neurogenic claudication M48.062    Relevant Medications    HYDROcodone-acetaminophen (Norco) 7.5-325 mg tablet    HYDROcodone-acetaminophen (Norco) 7.5-325 mg tablet     Other Visit Diagnoses         Codes    Neck pain    -  Primary M54.2    Relevant Orders    XR shoulder left 2+ views    XR cervical spine 2-3 views    Acute pain of left shoulder     M25.512    Relevant Orders    XR shoulder left 2+ views    XR cervical spine 2-3 views    Fall, initial encounter     W19.XXXA    Relevant Orders    XR shoulder left 2+ views    XR cervical spine 2-3 views        I had nice discussion with the patient today our plan will be as follows.      Radiology: [If patient's symptoms continue to reduce we will have patient not obtain x-rays if symptoms continue to be problematic patient should obtain x-rays.  Physical exam is relatively benign it seems more  like soft tissue contractures and muscle strain than any fracture type issue.]      Physically:  [ continue modification of activities, healthy lifestyle choice ]      Psychologically:  [ No acute psychological concerns ]      Medication: [I will refill the medications at the same dose and frequency. We will continue to monitor the patient bimonthly for compliance, adverse reaction or interations The patient continues to see benefit and improvement in their quality of life and ability to maintain ADLs. Patient educated about the risks of taking opioids and operating a motor vehicle. Patient reports no adverse side effects to current medication regimen. Current regimen does allow patient to maintain ADLs. Oarrs has been reviewed. No suspicion of diversion or abuse. Compliance with medication regime, no use of illicit drugs, no sharing of narcotic medications with others, do not use others narcotic medication, and to avoid alcohol use. Patient has been educated on the risks, benefits, and alternatives of controlled substances as well as the proper way to store these medications.   The patient and I discussed the nature of this medication and its side effects. We discussed tolerance, physical dependence, psychological dependence, addiction and opioid-induced hyperalgesia ]      Duration:  [ 2 months ]      Intervention:  [Patient spinal stenosis needing surgical intervention and patient not wanting is going to continue to cause bilateral lower extremity deficits.  Patient advised to continue to use assistive devices to help prevent injuries and trauma.]           Best Gonzalez PA-C 05/30/24 9:16 AM

## 2024-06-03 ENCOUNTER — HOSPITAL ENCOUNTER (OUTPATIENT)
Dept: RADIOLOGY | Facility: CLINIC | Age: 69
Discharge: HOME | End: 2024-06-03
Payer: MEDICARE

## 2024-06-03 DIAGNOSIS — M54.2 NECK PAIN: ICD-10-CM
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
## 2024-06-03 DIAGNOSIS — M25.512 ACUTE PAIN OF LEFT SHOULDER: ICD-10-CM

## 2024-06-03 PROCEDURE — 72040 X-RAY EXAM NECK SPINE 2-3 VW: CPT

## 2024-06-03 PROCEDURE — 72040 X-RAY EXAM NECK SPINE 2-3 VW: CPT | Performed by: RADIOLOGY

## 2024-06-03 PROCEDURE — 73030 X-RAY EXAM OF SHOULDER: CPT | Mod: LEFT SIDE | Performed by: RADIOLOGY

## 2024-06-03 PROCEDURE — 73030 X-RAY EXAM OF SHOULDER: CPT | Mod: LT

## 2024-06-04 ENCOUNTER — HOSPITAL ENCOUNTER (OUTPATIENT)
Dept: CARDIOLOGY | Facility: CLINIC | Age: 69
Discharge: HOME | End: 2024-06-04
Payer: MEDICARE

## 2024-06-04 DIAGNOSIS — Z95.810 PRESENCE OF AUTOMATIC CARDIOVERTER/DEFIBRILLATOR (AICD): ICD-10-CM

## 2024-06-04 DIAGNOSIS — I42.5 PRIMARY RESTRICTIVE CARDIOMYOPATHY (MULTI): ICD-10-CM

## 2024-06-04 PROCEDURE — 93296 REM INTERROG EVL PM/IDS: CPT

## 2024-07-12 DIAGNOSIS — M47.27 LUMBOSACRAL SPONDYLOSIS WITH RADICULOPATHY: ICD-10-CM

## 2024-07-12 DIAGNOSIS — M48.02 CERVICAL SPINAL STENOSIS: ICD-10-CM

## 2024-07-12 DIAGNOSIS — M48.062 SPINAL STENOSIS OF LUMBAR REGION WITH NEUROGENIC CLAUDICATION: ICD-10-CM

## 2024-07-12 RX ORDER — PREGABALIN 200 MG/1
200 CAPSULE ORAL 3 TIMES DAILY
Qty: 90 CAPSULE | Refills: 2 | Status: SHIPPED | OUTPATIENT
Start: 2024-07-12 | End: 2024-10-10

## 2024-07-18 ENCOUNTER — HOSPITAL ENCOUNTER (OUTPATIENT)
Dept: CARDIOLOGY | Facility: CLINIC | Age: 69
Discharge: HOME | End: 2024-07-18
Payer: MEDICARE

## 2024-07-18 DIAGNOSIS — I42.5 PRIMARY RESTRICTIVE CARDIOMYOPATHY (MULTI): ICD-10-CM

## 2024-07-18 DIAGNOSIS — Z95.810 PRESENCE OF AUTOMATIC CARDIOVERTER/DEFIBRILLATOR (AICD): ICD-10-CM

## 2024-07-30 ENCOUNTER — OFFICE VISIT (OUTPATIENT)
Dept: PAIN MEDICINE | Facility: CLINIC | Age: 69
End: 2024-07-30
Payer: MEDICARE

## 2024-07-30 VITALS
HEIGHT: 72 IN | BODY MASS INDEX: 25.33 KG/M2 | HEART RATE: 95 BPM | WEIGHT: 187 LBS | RESPIRATION RATE: 18 BRPM | DIASTOLIC BLOOD PRESSURE: 72 MMHG | OXYGEN SATURATION: 87 % | SYSTOLIC BLOOD PRESSURE: 131 MMHG

## 2024-07-30 DIAGNOSIS — M47.27 LUMBOSACRAL SPONDYLOSIS WITH RADICULOPATHY: ICD-10-CM

## 2024-07-30 DIAGNOSIS — Z79.899 HISTORY OF ONGOING TREATMENT WITH HIGH-RISK MEDICATION: Primary | ICD-10-CM

## 2024-07-30 DIAGNOSIS — M48.02 CERVICAL SPINAL STENOSIS: ICD-10-CM

## 2024-07-30 DIAGNOSIS — M48.062 SPINAL STENOSIS OF LUMBAR REGION WITH NEUROGENIC CLAUDICATION: ICD-10-CM

## 2024-07-30 PROCEDURE — 1036F TOBACCO NON-USER: CPT | Performed by: PHYSICIAN ASSISTANT

## 2024-07-30 PROCEDURE — 3052F HG A1C>EQUAL 8.0%<EQUAL 9.0%: CPT | Performed by: PHYSICIAN ASSISTANT

## 2024-07-30 PROCEDURE — 1159F MED LIST DOCD IN RCRD: CPT | Performed by: PHYSICIAN ASSISTANT

## 2024-07-30 PROCEDURE — 80307 DRUG TEST PRSMV CHEM ANLYZR: CPT | Mod: WESLAB | Performed by: PHYSICIAN ASSISTANT

## 2024-07-30 PROCEDURE — 1160F RVW MEDS BY RX/DR IN RCRD: CPT | Performed by: PHYSICIAN ASSISTANT

## 2024-07-30 PROCEDURE — 3060F POS MICROALBUMINURIA REV: CPT | Performed by: PHYSICIAN ASSISTANT

## 2024-07-30 PROCEDURE — 1125F AMNT PAIN NOTED PAIN PRSNT: CPT | Performed by: PHYSICIAN ASSISTANT

## 2024-07-30 PROCEDURE — 3078F DIAST BP <80 MM HG: CPT | Performed by: PHYSICIAN ASSISTANT

## 2024-07-30 PROCEDURE — 3075F SYST BP GE 130 - 139MM HG: CPT | Performed by: PHYSICIAN ASSISTANT

## 2024-07-30 PROCEDURE — 99214 OFFICE O/P EST MOD 30 MIN: CPT | Performed by: PHYSICIAN ASSISTANT

## 2024-07-30 PROCEDURE — 3048F LDL-C <100 MG/DL: CPT | Performed by: PHYSICIAN ASSISTANT

## 2024-07-30 PROCEDURE — 3008F BODY MASS INDEX DOCD: CPT | Performed by: PHYSICIAN ASSISTANT

## 2024-07-30 PROCEDURE — 80365 DRUG SCREENING OXYCODONE: CPT | Mod: WESLAB | Performed by: PHYSICIAN ASSISTANT

## 2024-07-30 RX ORDER — HYDROCODONE BITARTRATE AND ACETAMINOPHEN 7.5; 325 MG/1; MG/1
1 TABLET ORAL EVERY 6 HOURS PRN
Qty: 120 TABLET | Refills: 0 | Status: SHIPPED | OUTPATIENT
Start: 2024-07-30 | End: 2024-08-29

## 2024-07-30 ASSESSMENT — ENCOUNTER SYMPTOMS
DIARRHEA: 0
UNEXPECTED WEIGHT CHANGE: 0
FEVER: 0
WHEEZING: 0
BACK PAIN: 1
PALPITATIONS: 0
SLEEP DISTURBANCE: 0
NECK PAIN: 1
FATIGUE: 0
COUGH: 0
VOICE CHANGE: 0
PAIN: 1
NAUSEA: 0
ACTIVITY CHANGE: 0
SHORTNESS OF BREATH: 0
CHILLS: 0
ARTHRALGIAS: 1
VOMITING: 0

## 2024-07-30 ASSESSMENT — PAIN - FUNCTIONAL ASSESSMENT: PAIN_FUNCTIONAL_ASSESSMENT: 0-10

## 2024-07-30 ASSESSMENT — LIFESTYLE VARIABLES
SKIP TO QUESTIONS 9-10: 1
HOW MANY STANDARD DRINKS CONTAINING ALCOHOL DO YOU HAVE ON A TYPICAL DAY: PATIENT DOES NOT DRINK
AUDIT-C TOTAL SCORE: 0
HOW OFTEN DO YOU HAVE SIX OR MORE DRINKS ON ONE OCCASION: NEVER
HOW OFTEN DO YOU HAVE A DRINK CONTAINING ALCOHOL: NEVER

## 2024-07-30 ASSESSMENT — PAIN SCALES - GENERAL
PAINLEVEL_OUTOF10: 9
PAINLEVEL: 9

## 2024-07-30 NOTE — PROGRESS NOTES
MEDICATION NAME: Hydrocodone   STRENGTH: 7.5-325  LAST FILL DATE: 24  DATE LAST TAKEN: 24  QUANTITY FILLED: 120  QUANTITY REMAININ  COUNT COMPLETED BY: TRENTON RINCON and MATTHEW Diallo      UDS LAST COMPLETED:   CONTROLLED SUBSTANCES AGREEMENT LAST SIGNED:   ORT LAST COMPLETED:  Modified Oswestry disability form filled out annually.

## 2024-07-30 NOTE — PROGRESS NOTES
Subjective   Patient ID: Ha Payne is a 69 y.o. male who presents for Pain.  Patient is a 69-year-old male with cervical spinal stenosis lumbar spine stenosis with neurogenic claudication the presents today for follow-up.  Patient obtained his x-rays he did notes symptoms are getting better he is having some improvement in his quality of left shoulder range of motion.  His wife does state that he will be needing a pacemaker replacement within the year.  They continue to monitor and check his status.  Patient continues to follow with his counseling.  Continues to have the lower extremity numbness tingling.  He denies any injuries traumas or falls in the past 30 days    Pain  Pertinent negatives include no chest pain, diarrhea, fatigue, fever, nausea, shortness of breath, vomiting or wheezing.       Review of Systems   Constitutional:  Negative for activity change, chills, fatigue, fever and unexpected weight change.   HENT:  Negative for ear pain and voice change.    Eyes:  Negative for visual disturbance.   Respiratory:  Negative for cough, shortness of breath and wheezing.    Cardiovascular:  Negative for chest pain and palpitations.   Gastrointestinal:  Negative for diarrhea, nausea and vomiting.   Musculoskeletal:  Positive for arthralgias, back pain, gait problem and neck pain.   Psychiatric/Behavioral:  Negative for behavioral problems, self-injury, sleep disturbance and suicidal ideas.        Objective   Physical Exam  Vitals reviewed.   Constitutional:       Appearance: Normal appearance.   HENT:      Head: Normocephalic and atraumatic.      Mouth/Throat:      Mouth: Mucous membranes are moist.   Neck:      Vascular: No JVD.   Pulmonary:      Effort: Pulmonary effort is normal. No tachypnea or bradypnea.   Abdominal:      Palpations: Abdomen is soft.   Musculoskeletal:      Cervical back: Tenderness present. No spasms. Decreased range of motion.      Lumbar back: Spasms and tenderness present.  Decreased range of motion. Positive right straight leg raise test and positive left straight leg raise test.      Comments: Hypoestheisa BLE and single point cane for amb.   Skin:     General: Skin is warm and dry.   Neurological:      Mental Status: He is alert and oriented to person, place, and time.   Psychiatric:         Mood and Affect: Mood normal.         Behavior: Behavior normal. Behavior is cooperative.       Assessment/Plan   Problem List Items Addressed This Visit             ICD-10-CM    Lumbosacral spondylosis with radiculopathy M47.27    Relevant Medications    HYDROcodone-acetaminophen (Norco) 7.5-325 mg tablet    HYDROcodone-acetaminophen (Norco) 7.5-325 mg tablet    Cervical spinal stenosis M48.02    Relevant Medications    HYDROcodone-acetaminophen (Norco) 7.5-325 mg tablet    HYDROcodone-acetaminophen (Norco) 7.5-325 mg tablet    Spinal stenosis of lumbar region with neurogenic claudication M48.062    Relevant Medications    HYDROcodone-acetaminophen (Norco) 7.5-325 mg tablet    HYDROcodone-acetaminophen (Norco) 7.5-325 mg tablet   I had nice discussion with the patient today our plan will be as follows.      Radiology: [  xray show no acute findings, symptoms are reduceing from last apt.  ]      Physically:  [ continue modification of activities, healthy lifestyle choice ]      Psychologically:  [ No acute psychological concerns ]      Medication: [I will refill the medications at the same dose and frequency. We will continue to monitor the patient bimonthly for compliance, adverse reaction or interactions The patient continues to see benefit and improvement in their quality of life and ability to maintain ADLs. Patient educated about the risks of taking opioids and operating a motor vehicle. Patient reports no adverse side effects to current medication regimen. Current regimen does allow patient to maintain ADLs. Oarrs has been reviewed. No suspicion of diversion or abuse. Compliance with  medication regime, no use of illicit drugs, no sharing of narcotic medications with others, do not use others narcotic medication, and to avoid alcohol use. Patient has been educated on the risks, benefits, and alternatives of controlled substances as well as the proper way to store these medications.   The patient and I discussed the nature of this medication and its side effects. We discussed tolerance, physical dependence, psychological dependence, addiction and opioid-induced hyperalgesia  Pt to submit sample for tox screen ]      Duration:  [ 2 months ]      Intervention:  [ none at this time. Patient is stable.  ]           Best Gonzalez PA-C 07/30/24 10:42 AM

## 2024-07-31 LAB
AMPHETAMINES UR QL SCN: NORMAL
BARBITURATES UR QL SCN: NORMAL
BZE UR QL SCN: NORMAL
CANNABINOIDS UR QL SCN: NORMAL
CREAT UR-MCNC: 120.6 MG/DL (ref 20–370)
PCP UR QL SCN: NORMAL

## 2024-08-02 LAB
1OH-MIDAZOLAM UR CFM-MCNC: <25 NG/ML
6MAM UR CFM-MCNC: <25 NG/ML
7AMINOCLONAZEPAM UR CFM-MCNC: <25 NG/ML
A-OH ALPRAZ UR CFM-MCNC: <25 NG/ML
ALPRAZ UR CFM-MCNC: <25 NG/ML
CHLORDIAZEP UR CFM-MCNC: <25 NG/ML
CLONAZEPAM UR CFM-MCNC: <25 NG/ML
CODEINE UR CFM-MCNC: 1108 NG/ML
DIAZEPAM UR CFM-MCNC: <25 NG/ML
EDDP UR CFM-MCNC: <25 NG/ML
FENTANYL UR CFM-MCNC: <2.5 NG/ML
HYDROCODONE CTO UR CFM-MCNC: 106 NG/ML
HYDROMORPHONE UR CFM-MCNC: 80 NG/ML
LORAZEPAM UR CFM-MCNC: <25 NG/ML
METHADONE UR CFM-MCNC: <25 NG/ML
MIDAZOLAM UR CFM-MCNC: <25 NG/ML
MORPHINE UR CFM-MCNC: 95 NG/ML
NORDIAZEPAM UR CFM-MCNC: <25 NG/ML
NORFENTANYL UR CFM-MCNC: <2.5 NG/ML
NORHYDROCODONE UR CFM-MCNC: 122 NG/ML
NOROXYCODONE UR CFM-MCNC: <25 NG/ML
NORTRAMADOL UR-MCNC: <50 NG/ML
OXAZEPAM UR CFM-MCNC: <25 NG/ML
OXYCODONE UR CFM-MCNC: <25 NG/ML
OXYMORPHONE UR CFM-MCNC: <25 NG/ML
TEMAZEPAM UR CFM-MCNC: <25 NG/ML
TRAMADOL UR CFM-MCNC: <50 NG/ML
ZOLPIDEM UR CFM-MCNC: <25 NG/ML
ZOLPIDEM UR-MCNC: <25 NG/ML

## 2024-08-10 DIAGNOSIS — I50.22 CHRONIC SYSTOLIC CONGESTIVE HEART FAILURE (MULTI): Primary | ICD-10-CM

## 2024-08-12 RX ORDER — EPLERENONE 50 MG/1
50 TABLET, FILM COATED ORAL DAILY
Qty: 90 TABLET | Refills: 3 | Status: SHIPPED | OUTPATIENT
Start: 2024-08-12

## 2024-08-27 ENCOUNTER — HOSPITAL ENCOUNTER (OUTPATIENT)
Dept: CARDIOLOGY | Facility: CLINIC | Age: 69
Discharge: HOME | End: 2024-08-27
Payer: MEDICARE

## 2024-08-27 DIAGNOSIS — Z95.810 PRESENCE OF AUTOMATIC (IMPLANTABLE) CARDIAC DEFIBRILLATOR: ICD-10-CM

## 2024-08-27 DIAGNOSIS — I42.5 OTHER RESTRICTIVE CARDIOMYOPATHY (MULTI): ICD-10-CM

## 2024-09-04 ENCOUNTER — HOSPITAL ENCOUNTER (OUTPATIENT)
Dept: CARDIOLOGY | Facility: CLINIC | Age: 69
Discharge: HOME | End: 2024-09-04
Payer: MEDICARE

## 2024-09-04 DIAGNOSIS — Z95.810 PRESENCE OF AUTOMATIC (IMPLANTABLE) CARDIAC DEFIBRILLATOR: ICD-10-CM

## 2024-09-04 DIAGNOSIS — I42.5 OTHER RESTRICTIVE CARDIOMYOPATHY (MULTI): ICD-10-CM

## 2024-09-04 PROCEDURE — 93282 PRGRMG EVAL IMPLANTABLE DFB: CPT

## 2024-09-11 ENCOUNTER — APPOINTMENT (OUTPATIENT)
Dept: ENDOCRINOLOGY | Facility: CLINIC | Age: 69
End: 2024-09-11
Payer: MEDICARE

## 2024-09-11 ENCOUNTER — LAB (OUTPATIENT)
Dept: LAB | Facility: LAB | Age: 69
End: 2024-09-11
Payer: MEDICARE

## 2024-09-11 VITALS
DIASTOLIC BLOOD PRESSURE: 80 MMHG | BODY MASS INDEX: 25.65 KG/M2 | HEART RATE: 92 BPM | HEIGHT: 72 IN | RESPIRATION RATE: 16 BRPM | SYSTOLIC BLOOD PRESSURE: 120 MMHG | WEIGHT: 189.4 LBS

## 2024-09-11 DIAGNOSIS — I50.22 CHRONIC SYSTOLIC CONGESTIVE HEART FAILURE (MULTI): ICD-10-CM

## 2024-09-11 DIAGNOSIS — E11.9 TYPE 2 DIABETES MELLITUS WITHOUT COMPLICATION, WITH LONG-TERM CURRENT USE OF INSULIN (MULTI): ICD-10-CM

## 2024-09-11 DIAGNOSIS — I10 HYPERTENSION, UNSPECIFIED TYPE: ICD-10-CM

## 2024-09-11 DIAGNOSIS — Z79.4 TYPE 2 DIABETES MELLITUS WITHOUT COMPLICATION, WITH LONG-TERM CURRENT USE OF INSULIN (MULTI): ICD-10-CM

## 2024-09-11 DIAGNOSIS — Z79.4 TYPE 2 DIABETES MELLITUS WITHOUT COMPLICATION, WITH LONG-TERM CURRENT USE OF INSULIN (MULTI): Primary | ICD-10-CM

## 2024-09-11 DIAGNOSIS — E11.9 TYPE 2 DIABETES MELLITUS WITHOUT COMPLICATION, WITH LONG-TERM CURRENT USE OF INSULIN (MULTI): Primary | ICD-10-CM

## 2024-09-11 DIAGNOSIS — E78.00 ELEVATED LDL CHOLESTEROL LEVEL: ICD-10-CM

## 2024-09-11 LAB
ALBUMIN SERPL BCP-MCNC: 4.3 G/DL (ref 3.4–5)
ALP SERPL-CCNC: 80 U/L (ref 33–136)
ALT SERPL W P-5'-P-CCNC: 15 U/L (ref 10–52)
ANION GAP SERPL CALC-SCNC: 12 MMOL/L (ref 10–20)
AST SERPL W P-5'-P-CCNC: 18 U/L (ref 9–39)
BILIRUB SERPL-MCNC: 1 MG/DL (ref 0–1.2)
BNP SERPL-MCNC: 913 PG/ML (ref 0–99)
BUN SERPL-MCNC: 21 MG/DL (ref 6–23)
CALCIUM SERPL-MCNC: 9 MG/DL (ref 8.6–10.6)
CHLORIDE SERPL-SCNC: 100 MMOL/L (ref 98–107)
CO2 SERPL-SCNC: 32 MMOL/L (ref 21–32)
CREAT SERPL-MCNC: 1.23 MG/DL (ref 0.5–1.3)
EGFRCR SERPLBLD CKD-EPI 2021: 63 ML/MIN/1.73M*2
GLUCOSE SERPL-MCNC: 128 MG/DL (ref 74–99)
POC HEMOGLOBIN A1C: 6.4 % (ref 4.2–6.5)
POTASSIUM SERPL-SCNC: 4.2 MMOL/L (ref 3.5–5.3)
PROT SERPL-MCNC: 7.1 G/DL (ref 6.4–8.2)
SODIUM SERPL-SCNC: 140 MMOL/L (ref 136–145)

## 2024-09-11 PROCEDURE — 1036F TOBACCO NON-USER: CPT | Performed by: INTERNAL MEDICINE

## 2024-09-11 PROCEDURE — 80053 COMPREHEN METABOLIC PANEL: CPT

## 2024-09-11 PROCEDURE — 3052F HG A1C>EQUAL 8.0%<EQUAL 9.0%: CPT | Performed by: INTERNAL MEDICINE

## 2024-09-11 PROCEDURE — 3079F DIAST BP 80-89 MM HG: CPT | Performed by: INTERNAL MEDICINE

## 2024-09-11 PROCEDURE — 1159F MED LIST DOCD IN RCRD: CPT | Performed by: INTERNAL MEDICINE

## 2024-09-11 PROCEDURE — 36415 COLL VENOUS BLD VENIPUNCTURE: CPT

## 2024-09-11 PROCEDURE — 1160F RVW MEDS BY RX/DR IN RCRD: CPT | Performed by: INTERNAL MEDICINE

## 2024-09-11 PROCEDURE — 99214 OFFICE O/P EST MOD 30 MIN: CPT | Performed by: INTERNAL MEDICINE

## 2024-09-11 PROCEDURE — 83880 ASSAY OF NATRIURETIC PEPTIDE: CPT

## 2024-09-11 PROCEDURE — 3074F SYST BP LT 130 MM HG: CPT | Performed by: INTERNAL MEDICINE

## 2024-09-11 PROCEDURE — 83036 HEMOGLOBIN GLYCOSYLATED A1C: CPT | Performed by: INTERNAL MEDICINE

## 2024-09-11 PROCEDURE — 3008F BODY MASS INDEX DOCD: CPT | Performed by: INTERNAL MEDICINE

## 2024-09-11 PROCEDURE — 3060F POS MICROALBUMINURIA REV: CPT | Performed by: INTERNAL MEDICINE

## 2024-09-11 PROCEDURE — 3048F LDL-C <100 MG/DL: CPT | Performed by: INTERNAL MEDICINE

## 2024-09-11 RX ORDER — GLIPIZIDE 5 MG/1
15 TABLET ORAL DAILY
Qty: 270 TABLET | Refills: 3 | Status: SHIPPED | OUTPATIENT
Start: 2024-09-11

## 2024-09-11 ASSESSMENT — ENCOUNTER SYMPTOMS
FEVER: 0
SHORTNESS OF BREATH: 0
VOMITING: 0
CHILLS: 0
LIGHT-HEADEDNESS: 0
NAUSEA: 0
DIARRHEA: 0
DIZZINESS: 0

## 2024-09-11 NOTE — ASSESSMENT & PLAN NOTE
Bp excellent today  
  Stable on vytorin  
Again discussed dangers for low sugars and proper timing of glipizide  I do not think glipizide will be enough to sustain sugars without insulin and also discussed dangers of lows/likelihood of lows when taking glipizide on empty stomach  A1c today: plan based on result  Orders:    POCT glycosylated hemoglobin (Hb A1C) manually resulted    Comprehensive Metabolic Panel; Future    glipiZIDE (Glucotrol) 5 mg tablet; Take 3 tablets (15 mg) by mouth once daily. 2 tablets in am and 1 tablet in qpm    
cardiac monitor

## 2024-09-11 NOTE — PROGRESS NOTES
Endocrinology: Follow up visit  Subjective   Patient ID: Ha Payne is a 69 y.o. male who presents for Diabetes (Type 2 ), Hyperlipidemia, and Hypertension.    PCP: Bill Sue MD    HPI  Since last visit numbers low in am fairly frequently: taking increasing doses of glipizide late at night despite wife warning him not too.  During the day numbers are decent.   He wants to get off insulin.   Toujeo approx at 20.   Not eating right, usual habits  Metformin: gi intolerant  Sglt2: refuses  Checks sugars 4x a day  Injects insulin 1x a day  Currently utilizing cgm to check sugars    Review of Systems   Constitutional:  Negative for chills and fever.   Respiratory:  Negative for shortness of breath.    Gastrointestinal:  Negative for diarrhea, nausea and vomiting.   Endocrine: Negative for cold intolerance and heat intolerance.   Neurological:  Negative for dizziness and light-headedness.       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)    Drug rash    Dyshidrotic eczema    Dysuria    ED (erectile dysfunction)    Edema    Elevated LDL cholesterol level    Fever    Generalized neuropathy    H/O non-ST elevation myocardial infarction (NSTEMI)    Hematuria    Hypertension    Insomnia    Lower leg edema    Lumbosacral spondylosis with radiculopathy    Meningioma (Multi)    Muscle pain    Nephrolithiasis    Cervical spinal stenosis    Osteoarthritis of spine with radiculopathy, cervical region    Other chronic pain    Syncope    Ventricular tachycardia (Multi)    Weakness of left hand    Spinal stenosis of lumbar region with neurogenic claudication    Left ventricular apical thrombus    History of myocardial infarction    Restrictive cardiomyopathy  "(Multi)    Disorder involving thrombocytopenia (CMS-HCC)        Home Meds:  Current Outpatient Medications   Medication Instructions    acetaminophen (Tylenol) 325 mg tablet 1-2 tablets, oral, Every 4 hours PRN    aspirin 81 mg chewable tablet 1 tablet, oral, Every 24 hours    BD Ultra-Fine Mini Pen Needle 31 gauge x 3/16\" needle 1 each    carvedilol (COREG) 25 mg, oral, 2 times daily (morning and late afternoon)    cholecalciferol (Vitamin D-3) 25 MCG (1000 UT) capsule 1 capsule, oral, Daily    Entresto  mg tablet 1 tablet, oral, 2 times daily    eplerenone (INSPRA) 50 mg, oral, Daily    ezetimibe-simvastatin (Vytorin) 10-40 mg tablet 1 tablet, oral, Nightly    famotidine (Pepcid) 20 mg tablet 1 tablet, oral, Daily    glipiZIDE (GLUCOTROL) 15 mg, oral, Daily, 2 tablets in am and 1 tablet in qpm    HYDROcodone-acetaminophen (Norco) 7.5-325 mg tablet 1 tablet, oral, Every 6 hours PRN    HYDROcodone-acetaminophen (Norco) 7.5-325 mg tablet 1 tablet, oral, Every 6 hours PRN    insulin glargine (TOUJEO SOLOSTAR- 1 UNIT DIAL) 26 Units, subcutaneous, Every morning, Take as directed per insulin instructions.    loperamide (Imodium A-D) 2 mg capsule 1 capsule, oral, 4 times daily    nitroglycerin (NITROSTAT) 0.4 mg, sublingual, Every 5 min PRN    pen needle, diabetic (BD Doreen 2nd Gen Pen Needle) 32 gauge x 5/32\" needle Use 1x a day    pen needle, diabetic (BD ULTRA-FINE MINI PEN NEEDLE MISC) 1 Stick, miscellaneous, 2 times daily    pen needle, diabetic (TechLITE Pen Needle) 31 gauge x 5/16\" needle Use twice daily with insulin    pregabalin (LYRICA) 200 mg, oral, 3 times daily    torsemide (DEMADEX) 20 mg, oral, Daily        Allergies   Allergen Reactions    Amoxicillin-Pot Clavulanate Swelling and Unknown    Prochlorperazine Unknown and Other     Pt states he has spasms.    Brilinta [Ticagrelor] Rash        Objective   Vitals:    09/11/24 0926   BP: 120/80   Pulse: 92   Resp: 16      Vitals:    09/11/24 0926   Weight: " "85.9 kg (189 lb 6.4 oz)      Body mass index is 25.69 kg/m².   Physical Exam  Constitutional:       Appearance: Normal appearance. He is normal weight.   HENT:      Head: Normocephalic and atraumatic.   Neck:      Thyroid: No thyroid mass, thyromegaly or thyroid tenderness.   Cardiovascular:      Rate and Rhythm: Normal rate and regular rhythm.      Heart sounds: No murmur heard.     No gallop.   Pulmonary:      Effort: Pulmonary effort is normal.      Breath sounds: Normal breath sounds.   Abdominal:      Palpations: Abdomen is soft.      Comments: benign   Neurological:      General: No focal deficit present.      Mental Status: He is alert and oriented to person, place, and time.      Deep Tendon Reflexes: Reflexes are normal and symmetric.   Psychiatric:         Behavior: Behavior is cooperative.         Labs:  Lab Results   Component Value Date    HGBA1C 8.2 (H) 03/11/2024    TSH 1.86 07/09/2022      No results found for: \"PR1\", \"THYROIDPAB\", \"TSI\"     Assessment/Plan   Assessment & Plan  Type 2 diabetes mellitus without complication, with long-term current use of insulin (Multi)  Again discussed dangers for low sugars and proper timing of glipizide  I do not think glipizide will be enough to sustain sugars without insulin and also discussed dangers of lows/likelihood of lows when taking glipizide on empty stomach  A1c today: plan based on result  Orders:    POCT glycosylated hemoglobin (Hb A1C) manually resulted    Comprehensive Metabolic Panel; Future    glipiZIDE (Glucotrol) 5 mg tablet; Take 3 tablets (15 mg) by mouth once daily. 2 tablets in am and 1 tablet in qpm    Hypertension, unspecified type    Bp excellent today  Elevated LDL cholesterol level    Stable on vytorin  Follow up in 4 months    Electronically signed by:  Amelie Richardson MD 09/11/24 9:27 AM              "

## 2024-09-11 NOTE — PATIENT INSTRUCTIONS
A1c today excellent:  Continue glipizide but always WITH MEALS 2 tablets in am and 1 tablet in pm and ok to reduce insulin to 10 units  Message sugars in 1-2 wks

## 2024-09-13 ENCOUNTER — APPOINTMENT (OUTPATIENT)
Dept: PRIMARY CARE | Facility: CLINIC | Age: 69
End: 2024-09-13
Payer: MEDICARE

## 2024-09-13 ENCOUNTER — LAB (OUTPATIENT)
Dept: LAB | Facility: LAB | Age: 69
End: 2024-09-13
Payer: MEDICARE

## 2024-09-13 VITALS
SYSTOLIC BLOOD PRESSURE: 104 MMHG | DIASTOLIC BLOOD PRESSURE: 65 MMHG | HEART RATE: 77 BPM | BODY MASS INDEX: 25.31 KG/M2 | HEIGHT: 73 IN | WEIGHT: 191 LBS

## 2024-09-13 DIAGNOSIS — Z79.4 TYPE 2 DIABETES MELLITUS WITH OTHER SPECIFIED COMPLICATION, WITH LONG-TERM CURRENT USE OF INSULIN (MULTI): ICD-10-CM

## 2024-09-13 DIAGNOSIS — D32.9 MENINGIOMA (MULTI): ICD-10-CM

## 2024-09-13 DIAGNOSIS — Z12.5 PROSTATE CANCER SCREENING: ICD-10-CM

## 2024-09-13 DIAGNOSIS — I25.10 CORONARY ARTERY DISEASE DUE TO LIPID RICH PLAQUE: ICD-10-CM

## 2024-09-13 DIAGNOSIS — Z11.59 ENCOUNTER FOR HEPATITIS C SCREENING TEST FOR LOW RISK PATIENT: ICD-10-CM

## 2024-09-13 DIAGNOSIS — Z11.59 NEED FOR HEPATITIS B SCREENING TEST: ICD-10-CM

## 2024-09-13 DIAGNOSIS — Z11.4 ENCOUNTER FOR SCREENING FOR HIV: ICD-10-CM

## 2024-09-13 DIAGNOSIS — Z23 IMMUNIZATION DUE: ICD-10-CM

## 2024-09-13 DIAGNOSIS — E11.69 TYPE 2 DIABETES MELLITUS WITH OTHER SPECIFIED COMPLICATION, WITH LONG-TERM CURRENT USE OF INSULIN (MULTI): ICD-10-CM

## 2024-09-13 DIAGNOSIS — R59.0 MEDIASTINAL ADENOPATHY: ICD-10-CM

## 2024-09-13 DIAGNOSIS — R06.2 WHEEZING: ICD-10-CM

## 2024-09-13 DIAGNOSIS — Z95.810 CARDIAC DEFIBRILLATOR IN PLACE: ICD-10-CM

## 2024-09-13 DIAGNOSIS — D69.6 THROMBOCYTOPENIA (CMS-HCC): ICD-10-CM

## 2024-09-13 DIAGNOSIS — I25.83 CORONARY ARTERY DISEASE DUE TO LIPID RICH PLAQUE: ICD-10-CM

## 2024-09-13 DIAGNOSIS — I50.22 CHRONIC SYSTOLIC CONGESTIVE HEART FAILURE (MULTI): Primary | ICD-10-CM

## 2024-09-13 LAB
APTT PPP: 35 SECONDS (ref 27–38)
BASOPHILS # BLD AUTO: 0.07 X10*3/UL (ref 0–0.1)
BASOPHILS NFR BLD AUTO: 0.9 %
EOSINOPHIL # BLD AUTO: 0.46 X10*3/UL (ref 0–0.7)
EOSINOPHIL NFR BLD AUTO: 6 %
ERYTHROCYTE [DISTWIDTH] IN BLOOD BY AUTOMATED COUNT: 17.7 % (ref 11.5–14.5)
HBV SURFACE AG SERPL QL IA: NONREACTIVE
HCT VFR BLD AUTO: 54.7 % (ref 41–52)
HCV AB SER QL: NONREACTIVE
HGB BLD-MCNC: 17.6 G/DL (ref 13.5–17.5)
HIV 1+2 AB+HIV1 P24 AG SERPL QL IA: NONREACTIVE
IMM GRANULOCYTES # BLD AUTO: 0.02 X10*3/UL (ref 0–0.7)
IMM GRANULOCYTES NFR BLD AUTO: 0.3 % (ref 0–0.9)
INR PPP: 1.1 (ref 0.9–1.1)
LYMPHOCYTES # BLD AUTO: 1.19 X10*3/UL (ref 1.2–4.8)
LYMPHOCYTES NFR BLD AUTO: 15.6 %
MCH RBC QN AUTO: 30.8 PG (ref 26–34)
MCHC RBC AUTO-ENTMCNC: 32.2 G/DL (ref 32–36)
MCV RBC AUTO: 96 FL (ref 80–100)
MONOCYTES # BLD AUTO: 0.49 X10*3/UL (ref 0.1–1)
MONOCYTES NFR BLD AUTO: 6.4 %
NEUTROPHILS # BLD AUTO: 5.39 X10*3/UL (ref 1.2–7.7)
NEUTROPHILS NFR BLD AUTO: 70.8 %
NRBC BLD-RTO: 0 /100 WBCS (ref 0–0)
PLATELET # BLD AUTO: 66 X10*3/UL (ref 150–450)
PROTHROMBIN TIME: 12.1 SECONDS (ref 9.8–12.8)
RBC # BLD AUTO: 5.72 X10*6/UL (ref 4.5–5.9)
TSH SERPL-ACNC: 1.39 MIU/L (ref 0.44–3.98)
WBC # BLD AUTO: 7.6 X10*3/UL (ref 4.4–11.3)

## 2024-09-13 PROCEDURE — 85025 COMPLETE CBC W/AUTO DIFF WBC: CPT

## 2024-09-13 PROCEDURE — 3074F SYST BP LT 130 MM HG: CPT | Performed by: STUDENT IN AN ORGANIZED HEALTH CARE EDUCATION/TRAINING PROGRAM

## 2024-09-13 PROCEDURE — 3008F BODY MASS INDEX DOCD: CPT | Performed by: STUDENT IN AN ORGANIZED HEALTH CARE EDUCATION/TRAINING PROGRAM

## 2024-09-13 PROCEDURE — 87389 HIV-1 AG W/HIV-1&-2 AB AG IA: CPT

## 2024-09-13 PROCEDURE — G0008 ADMIN INFLUENZA VIRUS VAC: HCPCS | Performed by: STUDENT IN AN ORGANIZED HEALTH CARE EDUCATION/TRAINING PROGRAM

## 2024-09-13 PROCEDURE — 87340 HEPATITIS B SURFACE AG IA: CPT

## 2024-09-13 PROCEDURE — 84443 ASSAY THYROID STIM HORMONE: CPT

## 2024-09-13 PROCEDURE — 90662 IIV NO PRSV INCREASED AG IM: CPT | Performed by: STUDENT IN AN ORGANIZED HEALTH CARE EDUCATION/TRAINING PROGRAM

## 2024-09-13 PROCEDURE — 3060F POS MICROALBUMINURIA REV: CPT | Performed by: STUDENT IN AN ORGANIZED HEALTH CARE EDUCATION/TRAINING PROGRAM

## 2024-09-13 PROCEDURE — 84154 ASSAY OF PSA FREE: CPT

## 2024-09-13 PROCEDURE — 3052F HG A1C>EQUAL 8.0%<EQUAL 9.0%: CPT | Performed by: STUDENT IN AN ORGANIZED HEALTH CARE EDUCATION/TRAINING PROGRAM

## 2024-09-13 PROCEDURE — 85060 BLOOD SMEAR INTERPRETATION: CPT | Performed by: STUDENT IN AN ORGANIZED HEALTH CARE EDUCATION/TRAINING PROGRAM

## 2024-09-13 PROCEDURE — 85730 THROMBOPLASTIN TIME PARTIAL: CPT

## 2024-09-13 PROCEDURE — G0103 PSA SCREENING: HCPCS

## 2024-09-13 PROCEDURE — 3078F DIAST BP <80 MM HG: CPT | Performed by: STUDENT IN AN ORGANIZED HEALTH CARE EDUCATION/TRAINING PROGRAM

## 2024-09-13 PROCEDURE — 1036F TOBACCO NON-USER: CPT | Performed by: STUDENT IN AN ORGANIZED HEALTH CARE EDUCATION/TRAINING PROGRAM

## 2024-09-13 PROCEDURE — 3048F LDL-C <100 MG/DL: CPT | Performed by: STUDENT IN AN ORGANIZED HEALTH CARE EDUCATION/TRAINING PROGRAM

## 2024-09-13 PROCEDURE — 81450 HL NEO GSAP 5-50DNA/DNA&RNA: CPT

## 2024-09-13 PROCEDURE — 99215 OFFICE O/P EST HI 40 MIN: CPT | Performed by: STUDENT IN AN ORGANIZED HEALTH CARE EDUCATION/TRAINING PROGRAM

## 2024-09-13 PROCEDURE — 86803 HEPATITIS C AB TEST: CPT

## 2024-09-13 PROCEDURE — 36415 COLL VENOUS BLD VENIPUNCTURE: CPT

## 2024-09-13 PROCEDURE — G0452 MOLECULAR PATHOLOGY INTERPR: HCPCS | Performed by: STUDENT IN AN ORGANIZED HEALTH CARE EDUCATION/TRAINING PROGRAM

## 2024-09-13 PROCEDURE — 85610 PROTHROMBIN TIME: CPT

## 2024-09-13 ASSESSMENT — PATIENT HEALTH QUESTIONNAIRE - PHQ9
1. LITTLE INTEREST OR PLEASURE IN DOING THINGS: NOT AT ALL
SUM OF ALL RESPONSES TO PHQ9 QUESTIONS 1 AND 2: 0
2. FEELING DOWN, DEPRESSED OR HOPELESS: NOT AT ALL

## 2024-09-13 NOTE — PATIENT INSTRUCTIONS
Thank you for coming in today!    Labs in [address]    CT scan of the head (Meningioma surveillance)   CT scan of the lungs  Pulmonary Function Testing  Call [phone] to schedule    Flu shot today  COVID booster, RSV, and Pneumonia vaccine consideration through your pharmacy this fall    Think about Jardiance or Farxiga     If you change your mind regarding COLOGUARD or COLONOSCOPY, let me know!    Follow up in 4 months!    Best,  Dr. PLATA

## 2024-09-13 NOTE — PROGRESS NOTES
Ha Payne is a 69 y.o. male seen in [place] [facility] [place] Bill Sue on 09/13/24 for routine care, as well as for management of the following chronic medical conditions: CAD, HFrEF, T2DM, Spinal Stenosis, GERD, Meningioma, Thrombocytopenia. Patient is accompanied to this visit by his wife. He presents to transfer care from prior primary care provider, Dr. Issa.     Meningioma   Prior smoker (quit around 20 years ago)   Stable adenopathy--mediastinal     #CAD (remote MI s/p PCI)  #HFrEF (30-35%, ICD in place)   #History of Apical Mural Thrombus (s/p AC)   - follows with cardiology, Dr. Larsen   - ASA  - B-Blocker: Carvedilol 25mg BID  - ARNI/ACE/ARB: Entresto 97-103mg  - MRA: Eplerenone 50mg daily   - Diuretic: Torsemide 20mg daily   - Statin: Ezetimibe-Simvastatin 10-40mg  - AC: NONE at present   - Last Lipid panel: LDL 40, TG <100 on current regimen per labs 03/2024   - Last Echo in 2022, 30-35%, LV dilation     #T2DM  - follows with endo  - A1C 6.4% in 09/2024, CMP reassuring 09/224  - Slightly elevated urine albumin 03/2024   - Current Regimen: Glipizide 15mg daily, Glargine 10 units QAM (Metformin intolerant, has refused SGLT-2)    #Spinal Stenosis   - follows with pain management   - Lyrica 200mg TID, Norco 7.5-325mg Q6H PRN, Tylenol     #GERD  - H2 blocker     #Thrombocytopenia   - stable, persistent   [  ] lab evaluation including CBCd with smear, HIV and hepatitis screening, coags     #Meningioma   - seen on CT scan from 2022   - around 3cm at that time  - ICD not MRI compatible per report  [  ] updated imaging CT scan without contrast (MRI non-compatible per history and patient; message cardiology to indeed clarify)     #Wheezing  #Remote Smoking History   #Mediastinal Adenopathy   - seen on prior CT chest from 2022  [  ] updated CT imaging  [  ] PFTs     #Other  - Vitamin D supplement   - Advise B12 supplement (level 254 in 11/2023)     Past Medical History: as above   Past Medical History:  "  Diagnosis Date    Acute ischemic heart disease, unspecified (Multi) 06/27/2022    Acute coronary syndrome    Back pain     Intracardiac thrombosis, not elsewhere classified 08/12/2022    Apical mural thrombus    Old myocardial infarction 08/12/2022    History of acute myocardial infarction    Opioid dependence, uncomplicated (Multi) 10/03/2013    Opioid dependence    Other conditions influencing health status 04/17/2013    Disorder Of The Pelvis / Hip Joint / Femur    Other conditions influencing health status 04/17/2013    Toxicity From Drugs, Medicaments, Or Biological Substances     Subspecialty Medical Care: Cardiology, Endocrinology, Pain Medicine, Ophthalmology, Podiatry     Past Surgical History:   - CAD with stenting   No past surgical history on file.    Medications:  Current Outpatient Medications:     acetaminophen (Tylenol) 325 mg tablet, Take 1-2 tablets (325-650 mg) by mouth every 4 hours if needed., Disp: , Rfl:     aspirin 81 mg chewable tablet, Chew 1 tablet (81 mg) once every 24 hours., Disp: , Rfl:     BD Ultra-Fine Mini Pen Needle 31 gauge x 3/16\" needle, 1 each., Disp: , Rfl:     carvedilol (Coreg) 25 mg tablet, TAKE ONE TABLET BY MOUTH TWICE A DAY WITH MEALS, Disp: 180 tablet, Rfl: 3    cholecalciferol (Vitamin D-3) 25 MCG (1000 UT) capsule, Take 1 capsule (25 mcg) by mouth once daily., Disp: , Rfl:     Entresto  mg tablet, Take 1 tablet by mouth 2 times a day., Disp: 180 tablet, Rfl: 3    eplerenone (Inspra) 50 mg tablet, TAKE ONE TABLET BY MOUTH EVERY DAY, Disp: 90 tablet, Rfl: 3    ezetimibe-simvastatin (Vytorin) 10-40 mg tablet, Take 1 tablet by mouth once daily at bedtime., Disp: 90 tablet, Rfl: 3    famotidine (Pepcid) 20 mg tablet, Take 1 tablet (20 mg) by mouth once daily., Disp: , Rfl:     glipiZIDE (Glucotrol) 5 mg tablet, Take 3 tablets (15 mg) by mouth once daily. 2 tablets in am and 1 tablet in qpm, Disp: 270 tablet, Rfl: 3    HYDROcodone-acetaminophen (Norco) 7.5-325 mg " "tablet, Take 1 tablet by mouth every 6 hours if needed for severe pain (7 - 10)., Disp: 120 tablet, Rfl: 0    HYDROcodone-acetaminophen (Norco) 7.5-325 mg tablet, Take 1 tablet by mouth every 6 hours if needed for severe pain (7 - 10)., Disp: 120 tablet, Rfl: 0    insulin glargine (Toujeo Solostar- 1 unit dial) 300 unit/mL (1.5 mL) injection, Inject 26 Units under the skin once daily in the morning. Take as directed per insulin instructions., Disp: 7.8 mL, Rfl: 3    loperamide (Imodium A-D) 2 mg capsule, Take 1 capsule (2 mg) by mouth 4 times a day., Disp: , Rfl:     nitroglycerin (Nitrostat) 0.4 mg SL tablet, Place 1 tablet (0.4 mg) under the tongue every 5 minutes if needed for chest pain., Disp: 25 tablet, Rfl: 3    pen needle, diabetic (BD Doreen 2nd Gen Pen Needle) 32 gauge x 5/32\" needle, Use 1x a day, Disp: 100 each, Rfl: 3    pen needle, diabetic (BD ULTRA-FINE MINI PEN NEEDLE MISC), 1 Stick 2 times a day., Disp: , Rfl:     pen needle, diabetic (TechLITE Pen Needle) 31 gauge x 5/16\" needle, Use twice daily with insulin, Disp: 200 each, Rfl: 3    pregabalin (Lyrica) 200 mg capsule, Take 1 capsule (200 mg) by mouth 3 times a day., Disp: 90 capsule, Rfl: 2    torsemide (Demadex) 20 mg tablet, TAKE ONE TABLET BY MOUTH ONCE DAILY, Disp: 90 tablet, Rfl: 3  Pharmacy: [facility] ([place])     Allergies:   Allergies   Allergen Reactions    Amoxicillin-Pot Clavulanate Swelling and Unknown    Prochlorperazine Unknown and Other     Pt states he has spasms.    Brilinta [Ticagrelor] Rash     Immunizations:   - Flu shot today   - COVID booster advised  - RSV: advised   - Tdap due 2029  - PCV-20 deferred  - Shingrix: advised     Family History:   Family History   Problem Relation Name Age of Onset    Diabetes Brother       Social History:   Home/Living Situation/Falls/Safety Assessment: lives at home with wife  Education/Employment/Work/Vocational: retired, worked for  (); from [place]   Activities: " "limited by chronic back pain   Drug Use: prior smoker, quit around 20 years ago  Diet: healthy dietary habits discussed   Depression/Anxiety:   Sexuality/Contraception/Menstrual History:    Sleep: pain affects his sleep     Patient Information:  Health Insurance: Medicare   Transportation: wife   Healthcare POA/Guardian: wife   Contact Information: correct in EMR     Visit Vitals  /65 (BP Location: Right arm, Patient Position: Sitting, BP Cuff Size: Adult)   Pulse 77   Ht 1.854 m (6' 1\")   Wt 86.6 kg (191 lb)   BMI 25.20 kg/m²   Smoking Status Former   BSA 2.11 m²      PHYSICAL EXAM:   General: non-toxic appearing  male, NAD, pleasant and engaged in encounter    HEENT: NCAT, MMM, corrective lenses in place   CV: RRR, no m/r/g  PULM: rales bilaterally with faint crackles, non-labored respirations   ABD: soft, NT, ND  : no suprapubic or CVA tenderness   EXT: WWP, no significant edema   SKIN: no rashes noted   NEURO: A&Ox4, symmetric facies, gait and posture severely impacted by chronic back pain (hunched over positioning, uses cane for assistance/ambulation)   PSYCH: pleasant mood, appropriate affect     Assessment/Plan    Ha Payne is a 69 y.o. male seen in Clinic at [facility] [place] by Dr. Bill Sue on 09/13/24 for routine care, as well as for management of the following chronic medical conditions: CAD, HFrEF, T2DM, Spinal Stenosis, GERD, Meningioma, Thrombocytopenia. Patient is accompanied to this visit by his wife. He presents to transfer care from prior primary care provider, Dr. Issa.     Meningioma   Prior smoker (quit around 20 years ago)   Stable adenopathy--mediastinal     #CAD (remote MI s/p PCI)  #HFrEF (30-35%, ICD in place)   #History of Apical Mural Thrombus (s/p AC)   - follows with cardiology, Dr. Larsen   - ASA  - B-Blocker: Carvedilol 25mg BID  - ARNI/ACE/ARB: Entresto 97-103mg  - MRA: Eplerenone 50mg daily   - Diuretic: Torsemide 20mg daily   - Statin: " Ezetimibe-Simvastatin 10-40mg  - AC: NONE at present   - Last Lipid panel: LDL 40, TG <100 on current regimen per labs 03/2024   - Last Echo in 2022, 30-35%, LV dilation     #T2DM  - follows with endo  - A1C 6.4% in 09/2024, CMP reassuring 09/224  - Slightly elevated urine albumin 03/2024   - Current Regimen: Glipizide 15mg daily, Glargine 10 units QAM (Metformin intolerant, has refused SGLT-2)    #Spinal Stenosis   - follows with pain management   - Lyrica 200mg TID, Norco 7.5-325mg Q6H PRN, Tylenol     #GERD  - H2 blocker     #Thrombocytopenia   - stable, persistent   [  ] lab evaluation including CBCd with smear, HIV and hepatitis screening, coags     #Meningioma   - seen on CT scan from 2022   - around 3cm at that time  - ICD not MRI compatible per report  [  ] updated imaging CT scan without contrast (MRI non-compatible per history and patient; message cardiology to indeed clarify)     #Wheezing  #Remote Smoking History   #Mediastinal Adenopathy   - seen on prior CT chest from 2022  [  ] updated CT imaging  [  ] PFTs     #Other  - Vitamin D supplement   - Advise B12 supplement (level 254 in 11/2023)     #Health Maintenance    Cancer Screening  - Colorectal Cancer Screening: REFUSES any type of screening   - Lung Cancer Screening: not candidate   - Prostate Cancer Screening: labs today     Laboratory Screening  - Lipid Screen: at goal   - ASCVD Score: CAD  - A1C, glucose screen: 6.4% in 09/2024   - STI, HIV, Hep B screen: labs today   - Hep C screen: labs today     Imaging Screening  - AAA screening: negative 02/2021     Immunizations:   - Flu shot today   - COVID booster advised  - RSV: advised   - Tdap due 2029  - PCV-20 deferred  - Shingrix: advised     Other Screening  - Health Literacy Assessment: adequate   - Depression screen:   - Home safety/partner violence screen: negative   - Hearing/Vision screens: corrective lenses, follows with optho (T2DM)  - Alcohol/tobacco/drug use screen: prior smoker   -  Healthcare POA/Advanced Directives: wife     Referrals:   - labs  - CT head   - CT chest  - PFTs  - Flu shot, other immunization guidance  - REFUSES Colon Cancer Screening  - PSA with labs   - Continues to refuse/defer SGLT-2     Return to clinic in 4 months for follow-up.    Patient Discussion:    Please call back the office with any questions at [phone]. In the case of an emergency, please call 911 or go to the nearest Emergency Department.      Bill Sue MD  Internal Medicine-Pediatrics  [facility] [address], [address]  P: [phone], F: [phone]

## 2024-09-16 LAB
PATH REVIEW-CBC DIFFERENTIAL: NORMAL
PSA FREE MFR SERPL: 50 %
PSA FREE SERPL-MCNC: 0.3 NG/ML
PSA SERPL IA-MCNC: 0.6 NG/ML (ref 0–4)

## 2024-09-18 LAB
ELECTRONICALLY SIGNED BY: NORMAL
MYELOID NGS RESULTS: NORMAL

## 2024-09-25 ENCOUNTER — TELEPHONE (OUTPATIENT)
Dept: PAIN MEDICINE | Facility: CLINIC | Age: 69
End: 2024-09-25
Payer: MEDICARE

## 2024-09-25 DIAGNOSIS — M47.27 LUMBOSACRAL SPONDYLOSIS WITH RADICULOPATHY: ICD-10-CM

## 2024-09-25 DIAGNOSIS — M48.062 SPINAL STENOSIS OF LUMBAR REGION WITH NEUROGENIC CLAUDICATION: ICD-10-CM

## 2024-09-25 DIAGNOSIS — M48.02 CERVICAL SPINAL STENOSIS: ICD-10-CM

## 2024-09-25 RX ORDER — HYDROCODONE BITARTRATE AND ACETAMINOPHEN 7.5; 325 MG/1; MG/1
1 TABLET ORAL EVERY 6 HOURS PRN
Qty: 24 TABLET | Refills: 0 | Status: SHIPPED | OUTPATIENT
Start: 2024-09-28 | End: 2024-10-04

## 2024-09-25 NOTE — TELEPHONE ENCOUNTER
Pt appointment with Natasha has been rescheduled from 9/26 to 10/3.  Pt will need a new RX/Bridge for Norco.

## 2024-09-26 ENCOUNTER — APPOINTMENT (OUTPATIENT)
Dept: PAIN MEDICINE | Facility: CLINIC | Age: 69
End: 2024-09-26
Payer: MEDICARE

## 2024-10-03 ENCOUNTER — OFFICE VISIT (OUTPATIENT)
Dept: PAIN MEDICINE | Facility: CLINIC | Age: 69
End: 2024-10-03
Payer: MEDICARE

## 2024-10-03 VITALS
DIASTOLIC BLOOD PRESSURE: 80 MMHG | BODY MASS INDEX: 25.87 KG/M2 | WEIGHT: 191 LBS | HEART RATE: 103 BPM | HEIGHT: 72 IN | OXYGEN SATURATION: 97 % | SYSTOLIC BLOOD PRESSURE: 120 MMHG | RESPIRATION RATE: 22 BRPM

## 2024-10-03 DIAGNOSIS — G89.29 CHRONIC BILATERAL LOW BACK PAIN WITH RIGHT-SIDED SCIATICA: Primary | ICD-10-CM

## 2024-10-03 DIAGNOSIS — M54.41 CHRONIC BILATERAL LOW BACK PAIN WITH RIGHT-SIDED SCIATICA: Primary | ICD-10-CM

## 2024-10-03 DIAGNOSIS — M47.27 LUMBOSACRAL SPONDYLOSIS WITH RADICULOPATHY: ICD-10-CM

## 2024-10-03 DIAGNOSIS — M48.062 SPINAL STENOSIS OF LUMBAR REGION WITH NEUROGENIC CLAUDICATION: ICD-10-CM

## 2024-10-03 PROCEDURE — 3079F DIAST BP 80-89 MM HG: CPT | Performed by: NURSE PRACTITIONER

## 2024-10-03 PROCEDURE — 1159F MED LIST DOCD IN RCRD: CPT | Performed by: NURSE PRACTITIONER

## 2024-10-03 PROCEDURE — 1125F AMNT PAIN NOTED PAIN PRSNT: CPT | Performed by: NURSE PRACTITIONER

## 2024-10-03 PROCEDURE — 3048F LDL-C <100 MG/DL: CPT | Performed by: NURSE PRACTITIONER

## 2024-10-03 PROCEDURE — 3060F POS MICROALBUMINURIA REV: CPT | Performed by: NURSE PRACTITIONER

## 2024-10-03 PROCEDURE — 1036F TOBACCO NON-USER: CPT | Performed by: NURSE PRACTITIONER

## 2024-10-03 PROCEDURE — 3074F SYST BP LT 130 MM HG: CPT | Performed by: NURSE PRACTITIONER

## 2024-10-03 PROCEDURE — 99214 OFFICE O/P EST MOD 30 MIN: CPT | Performed by: NURSE PRACTITIONER

## 2024-10-03 PROCEDURE — 3052F HG A1C>EQUAL 8.0%<EQUAL 9.0%: CPT | Performed by: NURSE PRACTITIONER

## 2024-10-03 PROCEDURE — 3008F BODY MASS INDEX DOCD: CPT | Performed by: NURSE PRACTITIONER

## 2024-10-03 PROCEDURE — 1160F RVW MEDS BY RX/DR IN RCRD: CPT | Performed by: NURSE PRACTITIONER

## 2024-10-03 RX ORDER — HYDROCODONE BITARTRATE AND ACETAMINOPHEN 7.5; 325 MG/1; MG/1
1 TABLET ORAL EVERY 6 HOURS PRN
Qty: 120 TABLET | Refills: 0 | Status: SHIPPED | OUTPATIENT
Start: 2024-11-02 | End: 2024-12-02

## 2024-10-03 RX ORDER — PREGABALIN 200 MG/1
200 CAPSULE ORAL 3 TIMES DAILY
Qty: 90 CAPSULE | Refills: 2 | Status: SHIPPED | OUTPATIENT
Start: 2024-10-03 | End: 2025-01-01

## 2024-10-03 RX ORDER — HYDROCODONE BITARTRATE AND ACETAMINOPHEN 7.5; 325 MG/1; MG/1
1 TABLET ORAL EVERY 6 HOURS PRN
Qty: 120 TABLET | Refills: 0 | Status: SHIPPED | OUTPATIENT
Start: 2024-10-03 | End: 2024-11-02

## 2024-10-03 ASSESSMENT — ENCOUNTER SYMPTOMS
NAUSEA: 0
DIARRHEA: 0
NUMBNESS: 1
DYSURIA: 0
PALPITATIONS: 0
HEMATOLOGIC/LYMPHATIC NEGATIVE: 1
ABDOMINAL PAIN: 0
WEAKNESS: 1
SLEEP DISTURBANCE: 0
COUGH: 0
AGITATION: 0
BACK PAIN: 1
DIFFICULTY URINATING: 0
CONFUSION: 0
CONSTITUTIONAL NEGATIVE: 1
VOMITING: 0
SHORTNESS OF BREATH: 0
ABDOMINAL DISTENTION: 0

## 2024-10-03 ASSESSMENT — PATIENT HEALTH QUESTIONNAIRE - PHQ9
1. LITTLE INTEREST OR PLEASURE IN DOING THINGS: NOT AT ALL
2. FEELING DOWN, DEPRESSED OR HOPELESS: NOT AT ALL
SUM OF ALL RESPONSES TO PHQ9 QUESTIONS 1 & 2: 0

## 2024-10-03 ASSESSMENT — PAIN DESCRIPTION - DESCRIPTORS: DESCRIPTORS: ACHING

## 2024-10-03 ASSESSMENT — PAIN SCALES - GENERAL
PAINLEVEL: 8
PAINLEVEL_OUTOF10: 8

## 2024-10-03 ASSESSMENT — PAIN - FUNCTIONAL ASSESSMENT: PAIN_FUNCTIONAL_ASSESSMENT: 0-10

## 2024-10-03 NOTE — PROGRESS NOTES
MEDICATION NAME: hydrocodone   STRENGTH: 7.5/325  LAST FILL DATE: 2024  DATE LAST TAKEN: today  QUANTITY FILLED: 24  QUANTITY REMAININ  COUNT COMPLETED BY: FRANK PITTMAN LPN      UDS LAST COMPLETED: 2024.

## 2024-10-03 NOTE — PROGRESS NOTES
Subjective   Patient ID: Ha Payne is a 69 y.o. male who presents for Back Pain.  Back Pain  Associated symptoms include numbness and weakness. Pertinent negatives include no abdominal pain, chest pain or dysuria.     Patient is here for a follow up and refill for his lumbar radicular pain. He states his low back pain has been a little worse. He is noticing increased right leg pain. He states it is worse with walking. He states his knee does buckle at times. He has had injections in the past with minimal relief. He is not a candidate for surgery.       Review of Systems   Constitutional: Negative.    HENT: Negative.     Respiratory:  Negative for cough and shortness of breath.    Cardiovascular:  Negative for chest pain, palpitations and leg swelling.   Gastrointestinal:  Negative for abdominal distention, abdominal pain, diarrhea, nausea and vomiting.   Endocrine: Negative for cold intolerance and heat intolerance.   Genitourinary:  Negative for difficulty urinating, dysuria and urgency.   Musculoskeletal:  Positive for back pain.   Skin: Negative.    Neurological:  Positive for weakness and numbness.   Hematological: Negative.    Psychiatric/Behavioral:  Negative for agitation, confusion, sleep disturbance and suicidal ideas.        Objective   Physical Exam  Constitutional:       Appearance: Normal appearance.   HENT:      Head: Normocephalic.   Musculoskeletal:      Lumbar back: Spasms and tenderness present. Decreased range of motion.   Skin:     General: Skin is warm.   Neurological:      Mental Status: He is alert and oriented to person, place, and time.      Sensory: Sensory deficit (right lower leg) present.      Motor: Weakness present.      Gait: Gait abnormal.   Psychiatric:         Mood and Affect: Mood normal.         Behavior: Behavior normal.         Assessment/Plan   Problem List Items Addressed This Visit             ICD-10-CM       Neuro    Lumbosacral spondylosis with radiculopathy M47.27     Relevant Medications    HYDROcodone-acetaminophen (Norco) 7.5-325 mg tablet (Start on 11/2/2024)    HYDROcodone-acetaminophen (Norco) 7.5-325 mg tablet    pregabalin (Lyrica) 200 mg capsule    Spinal stenosis of lumbar region with neurogenic claudication M48.062    Relevant Medications    HYDROcodone-acetaminophen (Norco) 7.5-325 mg tablet (Start on 11/2/2024)    HYDROcodone-acetaminophen (Norco) 7.5-325 mg tablet    pregabalin (Lyrica) 200 mg capsule     Other Visit Diagnoses         Codes    Chronic bilateral low back pain with right-sided sciatica    -  Primary M54.41, G89.29               I nice discussion with the patient today our plan will be as follows.    Radiology: no new imaging needed at this time. Has CT on file.     Physically:  Has a daily home exercise routine.     Psychologically:  no concern at this time.     Medication: I will refill the patient's opioids today for 2 month.  The patient continues to see benefit and improvement in their quality of life and ability to maintain ADLs.  Patient educated about the risks of taking opioids and operating a motor vehicle.  Patient reports no adverse side effects to current medication regimen.  Current regimen does allow patient to maintain ADLs.  Patient reports no new neurologic symptoms, new pain areas, or exacerbation in pain today.  Patient reports they are happy with current treatment care path.    [place] was reviewed and was consistent with the history.    Patient has been educated on the risks, benefits, and alternatives of controlled substances as well as the proper way to store these medications.  The patient and I discussed the nature of this medication and its side effects.  We discussed tolerance, physical dependence, psychological dependence, addiction and opioid-induced hyperalgesia.  We discussed the potential need to wean from this medication.  We discussed the availability of programs that can help with this process if necessary.  We  discussed safety issues related to opioids including safe storage.  We discussed the fact that the patient should not drive an automobile or operate heavy machinery while taking this medication.  A prescription for naloxone was offered to the patient.  The patient will be re-evaluated for the need to continue opioid therapy in 60-90 days.      Duration:  chronic on going.     Intervention:  no intervention at this time. He is stable on his current dosing.   He has has injections in the past and is not a surgical candidate.     DINA Chisholm 10/03/24 9:47 AM

## 2024-10-08 ENCOUNTER — HOSPITAL ENCOUNTER (OUTPATIENT)
Dept: RADIOLOGY | Facility: CLINIC | Age: 69
Discharge: HOME | End: 2024-10-08
Payer: MEDICARE

## 2024-10-08 DIAGNOSIS — R59.0 MEDIASTINAL ADENOPATHY: ICD-10-CM

## 2024-10-08 DIAGNOSIS — D32.9 MENINGIOMA (MULTI): ICD-10-CM

## 2024-10-08 PROCEDURE — 71250 CT THORAX DX C-: CPT | Performed by: RADIOLOGY

## 2024-10-08 PROCEDURE — 71250 CT THORAX DX C-: CPT

## 2024-10-29 ENCOUNTER — HOSPITAL ENCOUNTER (OUTPATIENT)
Dept: CARDIOLOGY | Facility: CLINIC | Age: 69
Discharge: HOME | End: 2024-10-29
Payer: MEDICARE

## 2024-10-29 DIAGNOSIS — I42.5 OTHER RESTRICTIVE CARDIOMYOPATHY: ICD-10-CM

## 2024-10-29 DIAGNOSIS — Z95.810 PRESENCE OF AUTOMATIC (IMPLANTABLE) CARDIAC DEFIBRILLATOR: ICD-10-CM

## 2024-10-29 PROCEDURE — 93295 DEV INTERROG REMOTE 1/2/MLT: CPT | Performed by: INTERNAL MEDICINE

## 2024-10-29 PROCEDURE — 93296 REM INTERROG EVL PM/IDS: CPT

## 2024-11-07 ENCOUNTER — APPOINTMENT (OUTPATIENT)
Dept: CARDIOLOGY | Facility: CLINIC | Age: 69
End: 2024-11-07
Payer: MEDICARE

## 2024-11-11 ENCOUNTER — HOSPITAL ENCOUNTER (OUTPATIENT)
Dept: RADIOLOGY | Facility: CLINIC | Age: 69
Discharge: HOME | End: 2024-11-11
Payer: MEDICARE

## 2024-11-11 ENCOUNTER — ANCILLARY ORDERS (OUTPATIENT)
Dept: PRIMARY CARE | Facility: CLINIC | Age: 69
End: 2024-11-11
Payer: MEDICARE

## 2024-11-11 DIAGNOSIS — I50.22 CHRONIC SYSTOLIC CONGESTIVE HEART FAILURE: Primary | ICD-10-CM

## 2024-11-11 DIAGNOSIS — Z12.5 PROSTATE CANCER SCREENING: ICD-10-CM

## 2024-11-11 DIAGNOSIS — R59.0 MEDIASTINAL ADENOPATHY: ICD-10-CM

## 2024-11-11 DIAGNOSIS — Z11.4 ENCOUNTER FOR SCREENING FOR HIV: ICD-10-CM

## 2024-11-11 DIAGNOSIS — Z79.4 TYPE 2 DIABETES MELLITUS WITH OTHER SPECIFIED COMPLICATION, WITH LONG-TERM CURRENT USE OF INSULIN: ICD-10-CM

## 2024-11-11 DIAGNOSIS — R06.2 WHEEZING: ICD-10-CM

## 2024-11-11 DIAGNOSIS — Z11.59 NEED FOR HEPATITIS B SCREENING TEST: ICD-10-CM

## 2024-11-11 DIAGNOSIS — Z23 IMMUNIZATION DUE: ICD-10-CM

## 2024-11-11 DIAGNOSIS — I25.10 CORONARY ARTERY DISEASE DUE TO LIPID RICH PLAQUE: ICD-10-CM

## 2024-11-11 DIAGNOSIS — D69.6 THROMBOCYTOPENIA (CMS-HCC): ICD-10-CM

## 2024-11-11 DIAGNOSIS — R91.1 LUNG NODULE: ICD-10-CM

## 2024-11-11 DIAGNOSIS — C38.3 MALIGNANT NEOPLASM OF MEDIASTINUM, PART UNSPECIFIED (MULTI): ICD-10-CM

## 2024-11-11 DIAGNOSIS — I25.83 CORONARY ARTERY DISEASE DUE TO LIPID RICH PLAQUE: ICD-10-CM

## 2024-11-11 DIAGNOSIS — Z11.59 ENCOUNTER FOR HEPATITIS C SCREENING TEST FOR LOW RISK PATIENT: ICD-10-CM

## 2024-11-11 DIAGNOSIS — E11.69 TYPE 2 DIABETES MELLITUS WITH OTHER SPECIFIED COMPLICATION, WITH LONG-TERM CURRENT USE OF INSULIN: ICD-10-CM

## 2024-11-11 DIAGNOSIS — Z95.810 CARDIAC DEFIBRILLATOR IN PLACE: ICD-10-CM

## 2024-11-11 DIAGNOSIS — D32.9 MENINGIOMA (MULTI): ICD-10-CM

## 2024-11-11 PROCEDURE — 96374 THER/PROPH/DIAG INJ IV PUSH: CPT

## 2024-11-11 PROCEDURE — A9552 F18 FDG: HCPCS | Performed by: STUDENT IN AN ORGANIZED HEALTH CARE EDUCATION/TRAINING PROGRAM

## 2024-11-11 PROCEDURE — 3430000001 HC RX 343 DIAGNOSTIC RADIOPHARMACEUTICALS: Performed by: STUDENT IN AN ORGANIZED HEALTH CARE EDUCATION/TRAINING PROGRAM

## 2024-11-11 RX ORDER — FLUDEOXYGLUCOSE F 18 200 MCI/ML
14.05 INJECTION, SOLUTION INTRAVENOUS
Status: COMPLETED | OUTPATIENT
Start: 2024-11-11 | End: 2024-11-11

## 2024-11-18 DIAGNOSIS — Z01.818 PRE-OP TESTING: ICD-10-CM

## 2024-11-18 DIAGNOSIS — R59.1 LYMPHADENOPATHY: Primary | ICD-10-CM

## 2024-11-18 NOTE — PROGRESS NOTES
Bronchoscopy Scheduling Request    Pre-bronchoscopy visit: New patient visit with Bronchoscopy group provider  Please schedule procedure: Next available    Cytology on-site:  Yes  Location:  Either location  Performing physician:  Advanced diagnostic bronchoscopist  Referring physician:  , Bill Sue MD  Indication:  Enlarging Mediastinal Lymphadenopathy. Present since 2022, now right paratracheal measuring 2.8 cm. Has 5.4 mm CALLIE nodule.   Sedation / Anesthesia:  GA  Procedure:  Airway exam, BAL, EBBx, Dx EBUS, Sarcoid protocol  Time:  Tier 2  Fluorscopy:   No  Imaging needed:  None  Labs:  CBC, BMP  Meds:  None  Special Considerations:  he was unable to tolerate PET scan. requesting bronchoscopy due to enlarging lymphadenopathy, remote smoking history.  Reviewed by:  Frank Kaplan MD

## 2024-11-21 ENCOUNTER — TELEPHONE (OUTPATIENT)
Dept: PULMONOLOGY | Facility: HOSPITAL | Age: 69
End: 2024-11-21
Payer: MEDICARE

## 2024-11-21 NOTE — TELEPHONE ENCOUNTER
I reached out to Mr. Payne today to schedule his bronchoscopy however he declined to schedule stating he wants to wait until after his cardiology appt. in December.

## 2024-11-29 ENCOUNTER — HOSPITAL ENCOUNTER (OUTPATIENT)
Dept: CARDIOLOGY | Facility: CLINIC | Age: 69
Discharge: HOME | End: 2024-11-29
Payer: MEDICARE

## 2024-11-29 DIAGNOSIS — I42.5 OTHER RESTRICTIVE CARDIOMYOPATHY: ICD-10-CM

## 2024-11-29 DIAGNOSIS — Z95.0 PRESENCE OF CARDIAC PACEMAKER: ICD-10-CM

## 2024-12-02 ENCOUNTER — OFFICE VISIT (OUTPATIENT)
Dept: PAIN MEDICINE | Facility: CLINIC | Age: 69
End: 2024-12-02
Payer: MEDICARE

## 2024-12-02 VITALS
BODY MASS INDEX: 25.87 KG/M2 | HEIGHT: 72 IN | RESPIRATION RATE: 22 BRPM | WEIGHT: 191 LBS | SYSTOLIC BLOOD PRESSURE: 125 MMHG | OXYGEN SATURATION: 99 % | DIASTOLIC BLOOD PRESSURE: 79 MMHG | HEART RATE: 89 BPM

## 2024-12-02 DIAGNOSIS — M48.062 SPINAL STENOSIS OF LUMBAR REGION WITH NEUROGENIC CLAUDICATION: ICD-10-CM

## 2024-12-02 DIAGNOSIS — M47.27 LUMBOSACRAL SPONDYLOSIS WITH RADICULOPATHY: ICD-10-CM

## 2024-12-02 DIAGNOSIS — Z79.899 HISTORY OF ONGOING TREATMENT WITH HIGH-RISK MEDICATION: Primary | ICD-10-CM

## 2024-12-02 PROCEDURE — G2211 COMPLEX E/M VISIT ADD ON: HCPCS | Performed by: PHYSICIAN ASSISTANT

## 2024-12-02 PROCEDURE — 99214 OFFICE O/P EST MOD 30 MIN: CPT | Performed by: PHYSICIAN ASSISTANT

## 2024-12-02 PROCEDURE — 3052F HG A1C>EQUAL 8.0%<EQUAL 9.0%: CPT | Performed by: PHYSICIAN ASSISTANT

## 2024-12-02 PROCEDURE — 3008F BODY MASS INDEX DOCD: CPT | Performed by: PHYSICIAN ASSISTANT

## 2024-12-02 PROCEDURE — 1159F MED LIST DOCD IN RCRD: CPT | Performed by: PHYSICIAN ASSISTANT

## 2024-12-02 PROCEDURE — 3078F DIAST BP <80 MM HG: CPT | Performed by: PHYSICIAN ASSISTANT

## 2024-12-02 PROCEDURE — 3048F LDL-C <100 MG/DL: CPT | Performed by: PHYSICIAN ASSISTANT

## 2024-12-02 PROCEDURE — 1036F TOBACCO NON-USER: CPT | Performed by: PHYSICIAN ASSISTANT

## 2024-12-02 PROCEDURE — 1125F AMNT PAIN NOTED PAIN PRSNT: CPT | Performed by: PHYSICIAN ASSISTANT

## 2024-12-02 PROCEDURE — 3074F SYST BP LT 130 MM HG: CPT | Performed by: PHYSICIAN ASSISTANT

## 2024-12-02 PROCEDURE — 1160F RVW MEDS BY RX/DR IN RCRD: CPT | Performed by: PHYSICIAN ASSISTANT

## 2024-12-02 PROCEDURE — 3060F POS MICROALBUMINURIA REV: CPT | Performed by: PHYSICIAN ASSISTANT

## 2024-12-02 RX ORDER — HYDROCODONE BITARTRATE AND ACETAMINOPHEN 7.5; 325 MG/1; MG/1
1 TABLET ORAL EVERY 6 HOURS PRN
Qty: 120 TABLET | Refills: 0 | Status: SHIPPED | OUTPATIENT
Start: 2024-12-31 | End: 2025-01-30

## 2024-12-02 RX ORDER — HYDROCODONE BITARTRATE AND ACETAMINOPHEN 7.5; 325 MG/1; MG/1
1 TABLET ORAL EVERY 6 HOURS PRN
Qty: 120 TABLET | Refills: 0 | Status: SHIPPED | OUTPATIENT
Start: 2024-12-02 | End: 2025-01-01

## 2024-12-02 ASSESSMENT — ENCOUNTER SYMPTOMS
SHORTNESS OF BREATH: 0
PALPITATIONS: 0
FATIGUE: 0
ARTHRALGIAS: 1
FEVER: 0
ACTIVITY CHANGE: 0
NAUSEA: 0
COUGH: 0
VOMITING: 0
CHILLS: 0
NECK PAIN: 1
VOICE CHANGE: 0
BACK PAIN: 1
UNEXPECTED WEIGHT CHANGE: 0
DIARRHEA: 0
SLEEP DISTURBANCE: 0
WHEEZING: 0

## 2024-12-02 ASSESSMENT — PATIENT HEALTH QUESTIONNAIRE - PHQ9
SUM OF ALL RESPONSES TO PHQ9 QUESTIONS 1 & 2: 0
2. FEELING DOWN, DEPRESSED OR HOPELESS: NOT AT ALL
1. LITTLE INTEREST OR PLEASURE IN DOING THINGS: NOT AT ALL

## 2024-12-02 ASSESSMENT — PAIN SCALES - GENERAL
PAINLEVEL_OUTOF10: 8
PAINLEVEL_OUTOF10: 8

## 2024-12-02 ASSESSMENT — PAIN - FUNCTIONAL ASSESSMENT: PAIN_FUNCTIONAL_ASSESSMENT: 0-10

## 2024-12-02 ASSESSMENT — PAIN DESCRIPTION - DESCRIPTORS: DESCRIPTORS: ACHING;DULL

## 2024-12-02 NOTE — PROGRESS NOTES
Subjective   Patient ID: Ha Payne is a 69 y.o. male who presents for Back Pain.  Patient is a 69-year-old male with spinal stenosis neurogenic claudication and radiculopathy the presents today for follow-up.  Patient still continues to have symptoms of neurogenic claudication near falls right lower extremity worse than left lower extremity multiple arthritic joints.  Patient continues to be cautious with his physical activities to reduce exacerbations and potential for fall risk.  Patient's wife does help corroborate some of the subjective information provided today    Back Pain  Pertinent negatives include no chest pain or fever.       Review of Systems   Constitutional:  Negative for activity change, chills, fatigue, fever and unexpected weight change.   HENT:  Negative for ear pain and voice change.    Eyes:  Negative for visual disturbance.   Respiratory:  Negative for cough, shortness of breath and wheezing.    Cardiovascular:  Negative for chest pain and palpitations.   Gastrointestinal:  Negative for diarrhea, nausea and vomiting.   Musculoskeletal:  Positive for arthralgias, back pain, gait problem and neck pain.   Psychiatric/Behavioral:  Negative for behavioral problems, self-injury, sleep disturbance and suicidal ideas.        Objective   Physical Exam  Vitals reviewed.   Constitutional:       Appearance: Normal appearance.   HENT:      Head: Normocephalic and atraumatic.      Mouth/Throat:      Mouth: Mucous membranes are moist.   Neck:      Vascular: No JVD.   Pulmonary:      Effort: Pulmonary effort is normal. No tachypnea or bradypnea.   Abdominal:      Palpations: Abdomen is soft.   Musculoskeletal:      Cervical back: Tenderness present. No spasms. Decreased range of motion.      Lumbar back: Spasms and tenderness present. Decreased range of motion. Positive right straight leg raise test and positive left straight leg raise test.      Comments: Hypoestheisa BLE and single point cane for  amb.   Skin:     General: Skin is warm and dry.   Neurological:      Mental Status: He is alert and oriented to person, place, and time.   Psychiatric:         Mood and Affect: Mood normal.         Behavior: Behavior normal. Behavior is cooperative.       Assessment/Plan   Problem List Items Addressed This Visit             ICD-10-CM    Lumbosacral spondylosis with radiculopathy M47.27    Spinal stenosis of lumbar region with neurogenic claudication M48.062   I had nice discussion with the patient today our plan will be as follows.      Radiology: [ none at this time ]      Physically:  [ continue modification of activities, healthy lifestyle choice ]      Psychologically:  [ No acute psychological concerns. There are no mental health issues of which I am aware that are contributing to the patient's pain. There are no substance abuse or alcohol abuse issues of which I am aware that are contributing to the patient's pain. ]      Medication: [ I will refill the patient's opioids today for 2 month.  The patient continues to see benefit and improvement in their quality of life and ability to maintain ADLs.  Patient educated about the risks of taking opioids and operating a motor vehicle.  Patient reports no adverse side effects to current medication regimen.  Current regimen does allow patient to maintain ADLs.  Patient reports no new neurologic symptoms, new pain areas, or exacerbation in pain today.  Patient reports they are happy with current treatment care path.    [place]AR[place] was reviewed and was consistent with the history.    Patient has been educated on the risks, benefits, and alternatives of controlled substances as well as the proper way to store these medications.  The patient and I discussed the nature of this medication and its side effects.  We discussed tolerance, physical dependence, psychological dependence, addiction and opioid-induced hyperalgesia.  We discussed the potential need to wean from this  medication.  We discussed the availability of programs that can help with this process if necessary.  We discussed safety issues related to opioids including safe storage.  We discussed the fact that the patient should not drive an automobile or operate heavy machinery while taking this medication.  A prescription for naloxone was offered to the patient.  The patient will be re-evaluated for the need to continue opioid therapy in 60 days.   Patient will submit a toxicology screen today]      Duration:  [ 2 months ]      Intervention:  [ none at this time. Patient is stable.  ]           Best Gonzalez PA-C 12/02/24 9:41 AM

## 2024-12-02 NOTE — PROGRESS NOTES
MEDICATION NAME: hydro/acet  STRENGTH: 7.5/325mg  LAST FILL DATE: 2024  DATE LAST TAKEN: yesterday  QUANTITY FILLED: 120  QUANTITY REMAININ  COUNT COMPLETED BY: FRANK PITTMAN LPN      [facility] LAST COMPLETED: 2024.

## 2024-12-12 ENCOUNTER — OFFICE VISIT (OUTPATIENT)
Dept: CARDIOLOGY | Facility: CLINIC | Age: 69
End: 2024-12-12
Payer: MEDICARE

## 2024-12-12 VITALS
HEIGHT: 72 IN | WEIGHT: 192.06 LBS | OXYGEN SATURATION: 85 % | HEART RATE: 81 BPM | SYSTOLIC BLOOD PRESSURE: 116 MMHG | BODY MASS INDEX: 26.01 KG/M2 | DIASTOLIC BLOOD PRESSURE: 75 MMHG

## 2024-12-12 DIAGNOSIS — I25.10 CORONARY ARTERY DISEASE, UNSPECIFIED VESSEL OR LESION TYPE, UNSPECIFIED WHETHER ANGINA PRESENT, UNSPECIFIED WHETHER NATIVE OR TRANSPLANTED HEART: ICD-10-CM

## 2024-12-12 LAB
ATRIAL RATE: 80 BPM
P AXIS: 56 DEGREES
P OFFSET: 160 MS
P ONSET: 94 MS
PR INTERVAL: 238 MS
Q ONSET: 213 MS
QRS COUNT: 14 BEATS
QRS DURATION: 100 MS
QT INTERVAL: 398 MS
QTC CALCULATION(BAZETT): 459 MS
QTC FREDERICIA: 438 MS
R AXIS: -79 DEGREES
T AXIS: 96 DEGREES
T OFFSET: 412 MS
VENTRICULAR RATE: 80 BPM

## 2024-12-12 PROCEDURE — 3052F HG A1C>EQUAL 8.0%<EQUAL 9.0%: CPT | Performed by: INTERNAL MEDICINE

## 2024-12-12 PROCEDURE — 3078F DIAST BP <80 MM HG: CPT | Performed by: INTERNAL MEDICINE

## 2024-12-12 PROCEDURE — 99215 OFFICE O/P EST HI 40 MIN: CPT | Performed by: INTERNAL MEDICINE

## 2024-12-12 PROCEDURE — 3048F LDL-C <100 MG/DL: CPT | Performed by: INTERNAL MEDICINE

## 2024-12-12 PROCEDURE — 1125F AMNT PAIN NOTED PAIN PRSNT: CPT | Performed by: INTERNAL MEDICINE

## 2024-12-12 PROCEDURE — 3060F POS MICROALBUMINURIA REV: CPT | Performed by: INTERNAL MEDICINE

## 2024-12-12 PROCEDURE — 1036F TOBACCO NON-USER: CPT | Performed by: INTERNAL MEDICINE

## 2024-12-12 PROCEDURE — 3008F BODY MASS INDEX DOCD: CPT | Performed by: INTERNAL MEDICINE

## 2024-12-12 PROCEDURE — 3074F SYST BP LT 130 MM HG: CPT | Performed by: INTERNAL MEDICINE

## 2024-12-12 PROCEDURE — 93005 ELECTROCARDIOGRAM TRACING: CPT | Performed by: INTERNAL MEDICINE

## 2024-12-12 PROCEDURE — 1159F MED LIST DOCD IN RCRD: CPT | Performed by: INTERNAL MEDICINE

## 2024-12-12 PROCEDURE — 1160F RVW MEDS BY RX/DR IN RCRD: CPT | Performed by: INTERNAL MEDICINE

## 2024-12-12 PROCEDURE — G2211 COMPLEX E/M VISIT ADD ON: HCPCS | Performed by: INTERNAL MEDICINE

## 2024-12-12 ASSESSMENT — PAIN SCALES - GENERAL: PAINLEVEL_OUTOF10: 9

## 2024-12-12 ASSESSMENT — COLUMBIA-SUICIDE SEVERITY RATING SCALE - C-SSRS
2. HAVE YOU ACTUALLY HAD ANY THOUGHTS OF KILLING YOURSELF?: NO
1. IN THE PAST MONTH, HAVE YOU WISHED YOU WERE DEAD OR WISHED YOU COULD GO TO SLEEP AND NOT WAKE UP?: NO
6. HAVE YOU EVER DONE ANYTHING, STARTED TO DO ANYTHING, OR PREPARED TO DO ANYTHING TO END YOUR LIFE?: NO

## 2024-12-12 ASSESSMENT — ENCOUNTER SYMPTOMS
OCCASIONAL FEELINGS OF UNSTEADINESS: 1
LOSS OF SENSATION IN FEET: 0

## 2024-12-12 NOTE — PROGRESS NOTES
"Primary Care Physician: Bill Sue MD  Date of Visit: 12/12/2024  9:40 AM EST  Location of visit: [facility] [address]   Last office visit: 5/20/2024     Chief Complaint:     CAD, 6-month.    HPI/Summary  Ha Payne is a 69 y.o. male who presents for followup cardiology evaluation.     The patient is more than 20 years status post myocardial infarction.  In 2013, he underwent primary prevention ICD.  He has undergone several coronary interventions.  In 2016, we identified an apical mural thrombus with severe LV dysfunction, and prescribed a course of anticoagulation.  He also developed some \"clumsiness\" involving the left hand, which was self-limited, but he preferred to discontinue warfarin anticoagulation.  In October, 2019 he presented to the emergency room with ACS but declined hospitalization.  He has also declined treatment with an SGLT2 inhibitor.  He is limited by severe chronic back pain and his care has been somewhat compromised by medication noncompliance.  The last echocardiogram was on July 8, 2022.  The ejection fraction was 30 to 35% with global hypokinesis.  The left ventricle was dilated.  No thrombus.  No significant changes compared to February 6, 2021.  The  device check on November 29, 2024 showed no atrial or ventricular arrhythmia.    He continues on aspirin, carvedilol, Entresto, eplerenone, ezetimibe, simvastatin, torsemide, glipizide and Toujeo.  He has declined recommended SGLT2 inhibitor therapy on multiple occasions.  Metformin was withdrawn secondary to GI symptoms.    Recent laboratory testing reviewed: BNP remains elevated at 913.  Creatinine 1.23.  Hemoglobin A1c 6.4%.  Last LDL cholesterol only 40 mg/dL.    No real change clinically.  He takes torsemide a few times weekly, not daily as prescribed.  He does have some chronic right lower extremity edema.  He is mainly limited by chronic pain.  He saw primary care, a CT scan showed some progression of mediastinal " lymphadenopathy, he was scheduled for a PET scan but he could not lie flat for the imaging.  He has been advised to consider bronchoscopy, does not appear to be very interested in pursuing any further evaluation.  No fevers or chills, no night sweats, no weight loss.  Appetite is satisfactory.  Specialty Problems          Cardiology Problems    CAD (coronary artery disease)    Cardiac defibrillator in place     2005         Chronic congestive heart failure     September, 2012: EF 35-40.  April, 2016: EF 30.  October, 2019: EF 25.  February, 2021: EF 35.  July, 2022: EF 30-35.         Elevated LDL cholesterol level    H/O non-ST elevation myocardial infarction (NSTEMI)     October, 2019         Hypertension    Ventricular tachycardia (Multi)    Disorder involving thrombocytopenia (CMS-HCC)    History of myocardial infarction      Myocardial infarction 1995. Reinfarction 1998. November 21, 2012: Status post PCI to a      severe stenosis in the inferior limb of the obtuse marginal circumflex. Mild diffuse left      main, chronic total occlusion of LAD, collaterals from distal RCA to LAD, 60% first      marginal, 90% second marginal, 30% mid RCA, 40% in-stent restenosis of RCA.         Left ventricular apical thrombus     Apical mural thrombus         Restrictive cardiomyopathy (Multi)        Past Medical History:   Diagnosis Date    Acute ischemic heart disease, unspecified 06/27/2022    Acute coronary syndrome    Back pain     Intracardiac thrombosis, not elsewhere classified 08/12/2022    Apical mural thrombus    Old myocardial infarction 08/12/2022    History of acute myocardial infarction    Opioid dependence, uncomplicated (Multi) 10/03/2013    Opioid dependence    Other conditions influencing health status 04/17/2013    Disorder Of The Pelvis / Hip Joint / Femur    Other conditions influencing health status 04/17/2013    Toxicity From Drugs, Medicaments, Or Biological Substances          History reviewed. No  "pertinent surgical history.         Social History     Tobacco Use    Smoking status: Former     Current packs/day: 0.00     Types: Cigarettes     Quit date: 1995     Years since quittin.9    Smokeless tobacco: Never   Vaping Use    Vaping status: Never Used   Substance Use Topics    Alcohol use: Never    Drug use: Yes     Types: Hydrocodone                 Allergies   Allergen Reactions    Amoxicillin-Pot Clavulanate Swelling and Unknown    Prochlorperazine Unknown and Other     Pt states he has spasms.    Brilinta [Ticagrelor] Rash         Current Outpatient Medications   Medication Instructions    acetaminophen (Tylenol) 325 mg tablet 1-2 tablets, Every 4 hours PRN    aspirin 81 mg chewable tablet 1 tablet, Every 24 hours    BD Ultra-Fine Mini Pen Needle 31 gauge x 316\" needle 1 each    carvedilol (COREG) 25 mg, oral, 2 times daily (morning and late afternoon)    cholecalciferol (Vitamin D-3) 25 MCG (1000 UT) capsule 1 capsule, Daily    Entresto  mg tablet 1 tablet, oral, 2 times daily    eplerenone (INSPRA) 50 mg, oral, Daily    ezetimibe-simvastatin (Vytorin) 10-40 mg tablet 1 tablet, oral, Nightly    famotidine (Pepcid) 20 mg tablet 1 tablet, Daily    glipiZIDE (GLUCOTROL) 15 mg, oral, Daily, 2 tablets in am and 1 tablet in qpm    HYDROcodone-acetaminophen (Norco) 7.5-325 mg tablet 1 tablet, oral, Every 6 hours PRN    [START ON 2024] HYDROcodone-acetaminophen (Norco) 7.5-325 mg tablet 1 tablet, oral, Every 6 hours PRN    HYDROcodone-acetaminophen (Norco) 7.5-325 mg tablet 1 tablet, oral, Every 6 hours PRN    insulin glargine (TOUJEO SOLOSTAR- 1 UNIT DIAL) 26 Units, subcutaneous, Every morning, Take as directed per insulin instructions.    loperamide (Imodium A-D) 2 mg capsule 1 capsule, 4 times daily    nitroglycerin (NITROSTAT) 0.4 mg, sublingual, Every 5 min PRN    pen needle, diabetic (BD Doreen 2nd Gen Pen Needle) 32 gauge x \" needle Use 1x a day    pen needle, diabetic (BD " "ULTRA-FINE MINI PEN NEEDLE MISC) 1 Stick, 2 times daily    pen needle, diabetic (TechLITE Pen Needle) 31 gauge x 5/16\" needle Use twice daily with insulin    pregabalin (LYRICA) 200 mg, oral, 3 times daily    torsemide (DEMADEX) 20 mg, oral, Daily       ROS     Vital Signs:  Vitals:    12/12/24 0949   BP: 116/75   BP Location: Left arm   Patient Position: Sitting   BP Cuff Size: Large adult   Pulse: 81   SpO2: (!) 85%   Weight: 87.1 kg (192 lb 1 oz)   Height: 1.829 m (6')     Wt Readings from Last 2 Encounters:   12/12/24 87.1 kg (192 lb 1 oz)   12/02/24 86.6 kg (191 lb)     Body mass index is 26.05 kg/m².       Physical Exam:    On examination, he was in moderate but not severe pain.  Inspiratory rales.  2+ right lower extremity edema.  Heart sounds regular, no murmurs or gallops.     Last Labs:  CMP:  Recent Labs     09/11/24  1011 03/11/24  1036 09/19/23  1405 03/24/23  0832 11/08/22  0958    141 142 141 140   K 4.2 4.1 4.7 4.2 4.6    99 104 104 103   CO2 32 32 29 29 28   ANIONGAP 12 14 14 12 14   BUN 21 25* 21 22 14   CREATININE 1.23 0.98 0.99 1.04 0.79   EGFR 63 83  --   --   --    GLUCOSE 128* 132* 133* 104* 124*     Recent Labs     09/11/24  1011 03/11/24  1036 09/19/23  1405 03/24/23  0832 11/08/22  0958   ALBUMIN 4.3 4.3 4.2 4.1 4.1   ALKPHOS 80 100 66 83 62   ALT 15 9* 22 6* 5*   AST 18 12 25 9 10   BILITOT 1.0 0.9 0.6 0.8 0.8     CBC:  Recent Labs     09/13/24  1124 03/11/24  1036 11/06/23  1159 10/09/23  1027 09/19/23  1405   WBC 7.6 7.3 5.8 7.4 9.2   HGB 17.6* 14.7 15.0 16.7 16.3   HCT 54.7* 46.3 47.9 52.5* 52.1*   PLT 66* 91* 103* 86* 87*   MCV 96 92 99 95 95     COAG:   Recent Labs     09/13/24  1124 07/07/22  1603 09/09/21  0549 02/08/21  0815 02/05/21  2152   INR 1.1 1.2* 1.0 1.1 1.3*     HEME/ENDO:  Recent Labs     09/13/24  1124 09/11/24  0947 03/11/24  1036 11/06/23  1159 09/19/23  1405 03/24/23  0832 11/08/22  0958 07/09/22  1908 07/08/22  0700 12/09/21  1110 09/09/21  0549 "   FERRITIN  --   --   --  108  --   --   --   --   --   --   --    IRONSAT  --   --   --  11*  --   --   --   --   --   --   --    TSH 1.39  --   --   --   --   --   --  1.86 0.65  --  2.19   HGBA1C  --  6.4 8.2*  --  7.8* 7.5*   < > 6.8*  --    < >  --     < > = values in this interval not displayed.      CARDIAC:   Recent Labs     09/11/24  1011 03/24/23  0832 07/07/22  1945 07/07/22  1722 07/07/22  1603 09/09/21  0549 04/23/21  1015   TROPHS  --   --  11 13 15  --   --    * 1,192*  --   --  1,263* 113* 295*     Recent Labs     03/11/24  1036 03/24/23  0832 05/11/22  0904 02/06/21  1849 10/13/20  0840   CHOL 89 90 112  --  129   LDLF  --  46 58  --  57   HDL 29.0 26.8* 29.2*  --  39.2*   TRIG 99 86 124   < > 163*    < > = values in this interval not displayed.       Last Cardiology Tests:    ECG:    Sinus rhythm with first-degree AV block, left axis deviation, inferior and anterolateral infarction age undetermined.    Echo:  Echo Results:  No results found for this or any previous visit from the past 3650 days.       Cath:      Stress Test:  Stress Results:  No results found for this or any previous visit from the past 365 days.         Cardiac Imaging:        Assessment/Plan     We reviewed the patient's complex problem list and we reviewed the 2022 echocardiogram.  We discussed the mediastinal adenopathy which has been somewhat progressive since 2022.  He again declines consideration of an SGLT2 inhibitor.    His quality of life is markedly impaired by chronic back pain.  He has been compliant with most but not all of his cardiac care.  He will likely agree to go ahead and replace his ICD generator within the next 6 to 12 months.  The mediastinal adenopathy may be reactive, but an indolent lymphoma or other processes cannot be excluded.  We will be in touch with his primary care provider.  I suspect that he will decline any further evaluation at this time.    We will continue 6-month  monitoring.  Orders:  Orders Placed This Encounter   Procedures    ECG 12 lead (Clinic Performed)      Followup Appts:  Future Appointments   Date Time Provider Department Center   1/29/2025  9:00 AM Best Gonzalez PA-C [id number] [facility]   2/18/2025 11:15 AM Amelie Richardson MD [id number] [facility]           ____________________________________________________________  Burt Larsen MD    Senior Attending Physician  [facility]  [facility]    [facility]  [employer] [employer]

## 2024-12-12 NOTE — Clinical Note
Bill: He was in the office today.  We reviewed the recent CT scan that showed some progression of mediastinal adenopathy.  He was unable to complete the PET scan because of severe low back pain.  He has been compliant with some but not all of his cardiac care.  There have been periods of time that he is left the hospital AMA, has stopped taking his medications.  His quality of life is severely impaired by chronic pain.  I do not think that he is interested in pursuing mediastinoscopy or bronchoscopy but it would be worth a phone call from you to the patient and to his wife.  He is comfortable with a fairly minimalist approach, understands that his life expectancy is limited.  I have known him since 1995.

## 2024-12-13 LAB — SCAN RESULT: NORMAL

## 2024-12-16 ENCOUNTER — TELEPHONE (OUTPATIENT)
Dept: PULMONOLOGY | Facility: HOSPITAL | Age: 69
End: 2024-12-16
Payer: MEDICARE

## 2024-12-16 NOTE — TELEPHONE ENCOUNTER
12/16/2024 1511: Spoke with Mrs. Payne regarding scheduling with pulmonary. She stated they spoke with Dr. Larsen as well as Dr. Sue, patients PCP, they are declining any pulmonary appointments. Stated they will see Dr. Sue in the Spring and re-evaluate then. Leopoldo Jasmine RN.    Contacted pt- appointment was offered for Monday(12/19/2022) in triage and next Friday(12/23/2022) with Dr. Ahn, pt refused both.  Pt was advised to use cool compresses and increase artificial tears to see if this helps. She was also advised to contact Dr. Mai's office or go to the ED if the pain is too much for her. Mrs. Paul verbalized her understanding.    ----- Message from Shannan Castillo sent at 12/16/2022  3:37 PM CST -----  Regarding: Schedule  Pt called about her eye being swollen and having the feeling of something in her left eye. Pt states she has a lens from Dr. Ahn but has not heard from office.     Pts call back: [phone]

## 2024-12-30 ENCOUNTER — HOSPITAL ENCOUNTER (OUTPATIENT)
Dept: CARDIOLOGY | Facility: CLINIC | Age: 69
Discharge: HOME | End: 2024-12-30
Payer: MEDICARE

## 2024-12-30 DIAGNOSIS — I42.9 CARDIOMYOPATHY, UNSPECIFIED TYPE (MULTI): ICD-10-CM

## 2025-01-13 DIAGNOSIS — M48.062 SPINAL STENOSIS OF LUMBAR REGION WITH NEUROGENIC CLAUDICATION: ICD-10-CM

## 2025-01-13 DIAGNOSIS — M47.27 LUMBOSACRAL SPONDYLOSIS WITH RADICULOPATHY: ICD-10-CM

## 2025-01-13 RX ORDER — PREGABALIN 200 MG/1
200 CAPSULE ORAL 3 TIMES DAILY
Qty: 90 CAPSULE | Refills: 2 | Status: SHIPPED | OUTPATIENT
Start: 2025-01-16 | End: 2025-04-16

## 2025-01-16 DIAGNOSIS — I25.10 CORONARY ARTERY DISEASE, UNSPECIFIED VESSEL OR LESION TYPE, UNSPECIFIED WHETHER ANGINA PRESENT, UNSPECIFIED WHETHER NATIVE OR TRANSPLANTED HEART: ICD-10-CM

## 2025-01-16 RX ORDER — EZETIMIBE AND SIMVASTATIN 10; 40 MG/1; MG/1
1 TABLET ORAL NIGHTLY
Qty: 90 TABLET | Refills: 3 | Status: SHIPPED | OUTPATIENT
Start: 2025-01-16

## 2025-01-29 ENCOUNTER — APPOINTMENT (OUTPATIENT)
Dept: CARDIOLOGY | Facility: CLINIC | Age: 70
End: 2025-01-29
Payer: MEDICARE

## 2025-01-29 ENCOUNTER — HOSPITAL ENCOUNTER (OUTPATIENT)
Dept: CARDIOLOGY | Facility: CLINIC | Age: 70
Discharge: HOME | End: 2025-01-29
Payer: MEDICARE

## 2025-01-29 ENCOUNTER — TELEMEDICINE (OUTPATIENT)
Dept: PAIN MEDICINE | Facility: CLINIC | Age: 70
End: 2025-01-29
Payer: MEDICARE

## 2025-01-29 DIAGNOSIS — M47.27 LUMBOSACRAL SPONDYLOSIS WITH RADICULOPATHY: ICD-10-CM

## 2025-01-29 DIAGNOSIS — Z95.810 PRESENCE OF AUTOMATIC CARDIOVERTER/DEFIBRILLATOR (AICD): ICD-10-CM

## 2025-01-29 DIAGNOSIS — I47.20 VT (VENTRICULAR TACHYCARDIA) (MULTI): ICD-10-CM

## 2025-01-29 DIAGNOSIS — M48.062 SPINAL STENOSIS OF LUMBAR REGION WITH NEUROGENIC CLAUDICATION: ICD-10-CM

## 2025-01-29 PROCEDURE — 1160F RVW MEDS BY RX/DR IN RCRD: CPT | Performed by: PHYSICIAN ASSISTANT

## 2025-01-29 PROCEDURE — 1159F MED LIST DOCD IN RCRD: CPT | Performed by: PHYSICIAN ASSISTANT

## 2025-01-29 PROCEDURE — 1125F AMNT PAIN NOTED PAIN PRSNT: CPT | Performed by: PHYSICIAN ASSISTANT

## 2025-01-29 PROCEDURE — 99214 OFFICE O/P EST MOD 30 MIN: CPT | Performed by: PHYSICIAN ASSISTANT

## 2025-01-29 PROCEDURE — 93296 REM INTERROG EVL PM/IDS: CPT

## 2025-01-29 PROCEDURE — 1036F TOBACCO NON-USER: CPT | Performed by: PHYSICIAN ASSISTANT

## 2025-01-29 PROCEDURE — G2211 COMPLEX E/M VISIT ADD ON: HCPCS | Performed by: PHYSICIAN ASSISTANT

## 2025-01-29 RX ORDER — HYDROCODONE BITARTRATE AND ACETAMINOPHEN 7.5; 325 MG/1; MG/1
1 TABLET ORAL EVERY 6 HOURS PRN
Qty: 120 TABLET | Refills: 0 | Status: SHIPPED | OUTPATIENT
Start: 2025-03-01 | End: 2025-03-31

## 2025-01-29 RX ORDER — HYDROCODONE BITARTRATE AND ACETAMINOPHEN 7.5; 325 MG/1; MG/1
1 TABLET ORAL EVERY 6 HOURS PRN
Qty: 120 TABLET | Refills: 0 | Status: SHIPPED | OUTPATIENT
Start: 2025-01-30 | End: 2025-03-01

## 2025-01-29 ASSESSMENT — LIFESTYLE VARIABLES
SKIP TO QUESTIONS 9-10: 1
AUDIT-C TOTAL SCORE: 0
HOW MANY STANDARD DRINKS CONTAINING ALCOHOL DO YOU HAVE ON A TYPICAL DAY: PATIENT DOES NOT DRINK
HOW OFTEN DO YOU HAVE A DRINK CONTAINING ALCOHOL: NEVER
HOW OFTEN DO YOU HAVE SIX OR MORE DRINKS ON ONE OCCASION: NEVER

## 2025-01-29 ASSESSMENT — ENCOUNTER SYMPTOMS
SHORTNESS OF BREATH: 0
CHILLS: 0
VOICE CHANGE: 0
SLEEP DISTURBANCE: 0
DIARRHEA: 0
FATIGUE: 0
VOMITING: 0
ACTIVITY CHANGE: 0
COUGH: 0
WHEEZING: 0
PAIN: 1
ARTHRALGIAS: 1
NAUSEA: 0
BACK PAIN: 1
NECK PAIN: 1
FEVER: 0
PALPITATIONS: 0
UNEXPECTED WEIGHT CHANGE: 0

## 2025-01-29 ASSESSMENT — PAIN - FUNCTIONAL ASSESSMENT: PAIN_FUNCTIONAL_ASSESSMENT: 0-10

## 2025-01-29 ASSESSMENT — PAIN DESCRIPTION - DESCRIPTORS: DESCRIPTORS: ACHING

## 2025-01-29 ASSESSMENT — PAIN SCALES - GENERAL
PAINLEVEL_OUTOF10: 8
PAINLEVEL_OUTOF10: 8

## 2025-01-29 ASSESSMENT — PATIENT HEALTH QUESTIONNAIRE - PHQ9: 2. FEELING DOWN, DEPRESSED OR HOPELESS: NOT AT ALL

## 2025-01-29 NOTE — PROGRESS NOTES
Subjective   Patient ID: Ha Payne is a 69 y.o. male who presents for Pain.  Virtual or Telephone Consent    An interactive audio and video telecommunication system which permits real time communications between the patient (at the originating site) and provider (at the distant site) was utilized to provide this telehealth service.   Verbal consent was requested and obtained from Ha Payne on this date, 01/29/25 for a telehealth visit.     Is a 69-year-old male with lumbosacral spondylosis with radiculopathy and neurogenic claudication the presents today for follow-up.  Patient denotes that the extremely low temperatures caused increased axial back pain he did his best to stay in the warmth.  He denies any specific injuries or trauma the increase in temperatures has reduced these exacerbations.  Patient continues to safely ambulate modify his activities to help reduce exacerbations takes his medications without any side effects    Pain  Pertinent negatives include no chest pain, diarrhea, fatigue, fever, nausea, shortness of breath, vomiting or wheezing.       Review of Systems   Constitutional:  Negative for activity change, chills, fatigue, fever and unexpected weight change.   HENT:  Negative for ear pain and voice change.    Eyes:  Negative for visual disturbance.   Respiratory:  Negative for cough, shortness of breath and wheezing.    Cardiovascular:  Negative for chest pain and palpitations.   Gastrointestinal:  Negative for diarrhea, nausea and vomiting.   Musculoskeletal:  Positive for arthralgias, back pain, gait problem and neck pain.   Psychiatric/Behavioral:  Negative for behavioral problems, self-injury, sleep disturbance and suicidal ideas.        Objective   Physical Exam    Assessment/Plan   Problem List Items Addressed This Visit             ICD-10-CM    Lumbosacral spondylosis with radiculopathy M47.27    Relevant Medications    HYDROcodone-acetaminophen (Norco) 7.5-325 mg tablet  (Start on 1/30/2025)    HYDROcodone-acetaminophen (Norco) 7.5-325 mg tablet (Start on 3/1/2025)    Spinal stenosis of lumbar region with neurogenic claudication M48.062    Relevant Medications    HYDROcodone-acetaminophen (Norco) 7.5-325 mg tablet (Start on 1/30/2025)    HYDROcodone-acetaminophen (Norco) 7.5-325 mg tablet (Start on 3/1/2025)     I had nice discussion with the patient today our plan will be as follows.      Radiology: [ none at this time ]      Physically:  [ continue modification of activities, healthy lifestyle choice ]      Psychologically:  [ No acute psychological concerns. There are no mental health issues of which I am aware that are contributing to the patient's pain. There are no substance abuse or alcohol abuse issues of which I am aware that are contributing to the patient's pain. ]      Medication: [ I will refill the patient's opioids today for 2 month.  The patient continues to see benefit and improvement in their quality of life and ability to maintain ADLs.  Patient educated about the risks of taking opioids and operating a motor vehicle.  Patient reports no adverse side effects to current medication regimen.  Current regimen does allow patient to maintain ADLs.  Patient reports no new neurologic symptoms, new pain areas, or exacerbation in pain today.  Patient reports they are happy with current treatment care path.    [place]AR[place] was reviewed and was consistent with the history.    Patient has been educated on the risks, benefits, and alternatives of controlled substances as well as the proper way to store these medications.  The patient and I discussed the nature of this medication and its side effects.  We discussed tolerance, physical dependence, psychological dependence, addiction and opioid-induced hyperalgesia.  We discussed the potential need to wean from this medication.  We discussed the availability of programs that can help with this process if necessary.  We discussed safety  issues related to opioids including safe storage.  We discussed the fact that the patient should not drive an automobile or operate heavy machinery while taking this medication.  A prescription for naloxone was offered to the patient.  The patient will be re-evaluated for the need to continue opioid therapy in 60 days. ]      Duration:  [ 2 months ]      Intervention:  [ none at this time. Patient is stable.  ]        Please note that this report has been produced using speech recognition software. It may contain errors related to grammar, punctuation or spelling. Electronically signed, but not reviewed.          Best Gonzalez PA-C 01/29/25 8:59 AM

## 2025-02-18 ENCOUNTER — APPOINTMENT (OUTPATIENT)
Dept: ENDOCRINOLOGY | Facility: CLINIC | Age: 70
End: 2025-02-18
Payer: MEDICARE

## 2025-02-18 VITALS
DIASTOLIC BLOOD PRESSURE: 64 MMHG | WEIGHT: 200 LBS | RESPIRATION RATE: 16 BRPM | BODY MASS INDEX: 27.09 KG/M2 | SYSTOLIC BLOOD PRESSURE: 110 MMHG | HEIGHT: 72 IN | HEART RATE: 62 BPM

## 2025-02-18 DIAGNOSIS — E78.00 ELEVATED LDL CHOLESTEROL LEVEL: ICD-10-CM

## 2025-02-18 DIAGNOSIS — Z79.4 TYPE 2 DIABETES MELLITUS WITHOUT COMPLICATION, WITH LONG-TERM CURRENT USE OF INSULIN (MULTI): Primary | ICD-10-CM

## 2025-02-18 DIAGNOSIS — E11.9 TYPE 2 DIABETES MELLITUS WITHOUT COMPLICATION, WITH LONG-TERM CURRENT USE OF INSULIN (MULTI): Primary | ICD-10-CM

## 2025-02-18 DIAGNOSIS — I10 HYPERTENSION, UNSPECIFIED TYPE: ICD-10-CM

## 2025-02-18 PROCEDURE — 1160F RVW MEDS BY RX/DR IN RCRD: CPT | Performed by: INTERNAL MEDICINE

## 2025-02-18 PROCEDURE — 3074F SYST BP LT 130 MM HG: CPT | Performed by: INTERNAL MEDICINE

## 2025-02-18 PROCEDURE — 1036F TOBACCO NON-USER: CPT | Performed by: INTERNAL MEDICINE

## 2025-02-18 PROCEDURE — G2211 COMPLEX E/M VISIT ADD ON: HCPCS | Performed by: INTERNAL MEDICINE

## 2025-02-18 PROCEDURE — 1159F MED LIST DOCD IN RCRD: CPT | Performed by: INTERNAL MEDICINE

## 2025-02-18 PROCEDURE — 3078F DIAST BP <80 MM HG: CPT | Performed by: INTERNAL MEDICINE

## 2025-02-18 PROCEDURE — 99214 OFFICE O/P EST MOD 30 MIN: CPT | Performed by: INTERNAL MEDICINE

## 2025-02-18 PROCEDURE — 3008F BODY MASS INDEX DOCD: CPT | Performed by: INTERNAL MEDICINE

## 2025-02-18 ASSESSMENT — ENCOUNTER SYMPTOMS
DIARRHEA: 0
SHORTNESS OF BREATH: 0
VOMITING: 0
CHILLS: 0
LIGHT-HEADEDNESS: 0
FEVER: 0
DIZZINESS: 0
NAUSEA: 0

## 2025-02-18 NOTE — ASSESSMENT & PLAN NOTE
Fasting labs due  Will stop pm glipizide due to am lows  Orders:    CBC; Future    Comprehensive Metabolic Panel; Future    Lipid Panel; Future    Hemoglobin A1C; Future    Albumin-Creatinine Ratio, Urine Random; Future

## 2025-02-18 NOTE — PROGRESS NOTES
Endocrinology: Follow up visit  Subjective   Patient ID: Ha Payne is a 70 y.o. male who presents for Diabetes (Type 2), Hyperlipidemia, and Hypertension.    PCP: Bill Sue MD    HPI  [place] approx at 20.     Glipizide 10 mg/5 mg    Metformin: gi intolerant  Sglt2: refuses  Checks sugars 4x a day  Injects insulin 1x a day  Currently utilizing cgm to check sugars  Sugars lately are mostly excellent.  Occ lows in am.   Still erratic eating habits  Feeling ok, no complaints  Review of Systems   Constitutional:  Negative for chills and fever.   Respiratory:  Negative for shortness of breath.    Gastrointestinal:  Negative for diarrhea, nausea and vomiting.   Endocrine: Negative for cold intolerance and heat intolerance.   Neurological:  Negative for dizziness and light-headedness.       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)    Drug rash    Dyshidrotic eczema    Dysuria    ED (erectile dysfunction)    Edema    Elevated LDL cholesterol level    Fever    Generalized neuropathy    H/O non-ST elevation myocardial infarction (NSTEMI)    Hematuria    Hypertension    Insomnia    Lower leg edema    Lumbosacral spondylosis with radiculopathy    Meningioma (Multi)    Muscle pain    Nephrolithiasis    Cervical spinal stenosis    Osteoarthritis of spine with radiculopathy, cervical region    Other chronic pain    Syncope    Ventricular tachycardia (Multi)    Weakness of left hand    Spinal stenosis of lumbar region with neurogenic claudication    Left ventricular apical thrombus    History of myocardial infarction    Restrictive cardiomyopathy (Multi)    Disorder involving thrombocytopenia (CMS-HCC)    Lymphadenopathy        Home Meds:  Current Outpatient  "Medications   Medication Instructions    acetaminophen (Tylenol) 325 mg tablet 1-2 tablets, Every 4 hours PRN    aspirin 81 mg chewable tablet 1 tablet, Every 24 hours    BD Ultra-Fine Mini Pen Needle 31 gauge x 3/16\" needle 1 each    carvedilol (COREG) 25 mg, oral, 2 times daily (morning and late afternoon)    cholecalciferol (Vitamin D-3) 25 MCG (1000 UT) capsule 1 capsule, Daily    Entresto  mg tablet 1 tablet, oral, 2 times daily    eplerenone (INSPRA) 50 mg, oral, Daily    ezetimibe-simvastatin (Vytorin) 10-40 mg tablet 1 tablet, oral, Nightly    famotidine (Pepcid) 20 mg tablet 1 tablet, Daily    glipiZIDE (GLUCOTROL) 15 mg, oral, Daily, 2 tablets in am and 1 tablet in qpm    HYDROcodone-acetaminophen (Norco) 7.5-325 mg tablet 1 tablet, oral, Every 6 hours PRN    [START ON 3/1/2025] HYDROcodone-acetaminophen (Norco) 7.5-325 mg tablet 1 tablet, oral, Every 6 hours PRN    insulin glargine (TOUJEO SOLOSTAR- 1 UNIT DIAL) 26 Units, subcutaneous, Every morning, Take as directed per insulin instructions.    loperamide (Imodium A-D) 2 mg capsule 1 capsule, 4 times daily    nitroglycerin (NITROSTAT) 0.4 mg, sublingual, Every 5 min PRN    pen needle, diabetic (BD Doreen 2nd Gen Pen Needle) 32 gauge x 5/32\" needle Use 1x a day    pen needle, diabetic (BD ULTRA-FINE MINI PEN NEEDLE MISC) 1 Stick, 2 times daily    pen needle, diabetic (TechLITE Pen Needle) 31 gauge x 5/16\" needle Use twice daily with insulin    pregabalin (LYRICA) 200 mg, oral, 3 times daily    torsemide (DEMADEX) 20 mg, oral, Daily        Allergies   Allergen Reactions    Amoxicillin-Pot Clavulanate Swelling and Unknown    Prochlorperazine Unknown and Other     Pt states he has spasms.    Brilinta [Ticagrelor] Rash        Objective   Vitals:    02/18/25 1132   BP: 110/64   Pulse: 62   Resp: 16      Vitals:    02/18/25 1132   Weight: 90.7 kg (200 lb)      Body mass index is 27.12 kg/m².   Physical Exam  Constitutional:       Appearance: Normal " appearance. He is overweight.   HENT:      Head: Normocephalic and atraumatic.   Neck:      Thyroid: No thyroid mass, thyromegaly or thyroid tenderness.   Cardiovascular:      Rate and Rhythm: Normal rate and regular rhythm.      Heart sounds: No murmur heard.     No gallop.   Pulmonary:      Effort: Pulmonary effort is normal.      Breath sounds: Normal breath sounds.   Abdominal:      Palpations: Abdomen is soft.      Comments: benign   Neurological:      General: No focal deficit present.      Mental Status: He is alert and oriented to person, place, and time.      Deep Tendon Reflexes: Reflexes are normal and symmetric.   Psychiatric:         Behavior: Behavior is cooperative.         Labs:  Lab Results   Component Value Date    HGBA1C 6.4 09/11/2024    TSH 1.39 09/13/2024      Lab Results   Component Value Date    PR1  09/13/2024     Thrombocytopenia and lymphopenia. Increased red blood cell mass, consistent with increased hemoglobin and hematocrit on CBC. Clinical correlation recommended, including with results of myeloid malignancies panel.        Assessment/Plan   Assessment & Plan  Type 2 diabetes mellitus without complication, with long-term current use of insulin (Multi)  Fasting labs due  Will stop pm glipizide due to am lows  Orders:    CBC; Future    Comprehensive Metabolic Panel; Future    Lipid Panel; Future    Hemoglobin A1C; Future    Albumin-Creatinine Ratio, Urine Random; Future    Hypertension, unspecified type    Bp excellent  Elevated LDL cholesterol level    Continue statin: due for fasting labs      Electronically signed by:  Amelie Richardson MD 02/18/25 11:33 AM

## 2025-03-03 ENCOUNTER — HOSPITAL ENCOUNTER (OUTPATIENT)
Dept: CARDIOLOGY | Facility: CLINIC | Age: 70
Discharge: HOME | End: 2025-03-03
Payer: MEDICARE

## 2025-03-03 DIAGNOSIS — I42.9 CARDIOMYOPATHY, UNSPECIFIED TYPE (MULTI): ICD-10-CM

## 2025-03-05 LAB
ALBUMIN SERPL-MCNC: 4.3 G/DL (ref 3.6–5.1)
ALBUMIN/CREAT UR: 66 MG/G CREAT
ALP SERPL-CCNC: 108 U/L (ref 35–144)
ALT SERPL-CCNC: 9 U/L (ref 9–46)
ANION GAP SERPL CALCULATED.4IONS-SCNC: 10 MMOL/L (CALC) (ref 7–17)
AST SERPL-CCNC: 14 U/L (ref 10–35)
BILIRUB SERPL-MCNC: 1.1 MG/DL (ref 0.2–1.2)
BUN SERPL-MCNC: 22 MG/DL (ref 7–25)
CALCIUM SERPL-MCNC: 9.6 MG/DL (ref 8.6–10.3)
CHLORIDE SERPL-SCNC: 102 MMOL/L (ref 98–110)
CHOLEST SERPL-MCNC: 91 MG/DL
CHOLEST/HDLC SERPL: 2.8 (CALC)
CO2 SERPL-SCNC: 29 MMOL/L (ref 20–32)
CREAT SERPL-MCNC: 0.92 MG/DL (ref 0.7–1.28)
CREAT UR-MCNC: 74 MG/DL (ref 20–320)
EGFRCR SERPLBLD CKD-EPI 2021: 89 ML/MIN/1.73M2
ERYTHROCYTE [DISTWIDTH] IN BLOOD BY AUTOMATED COUNT: 16.9 % (ref 11–15)
EST. AVERAGE GLUCOSE BLD GHB EST-MCNC: 186 MG/DL
EST. AVERAGE GLUCOSE BLD GHB EST-SCNC: 10.3 MMOL/L
GLUCOSE SERPL-MCNC: 80 MG/DL (ref 65–99)
HBA1C MFR BLD: 8.1 % OF TOTAL HGB
HCT VFR BLD AUTO: 55.8 % (ref 38.5–50)
HDLC SERPL-MCNC: 32 MG/DL
HGB BLD-MCNC: 17.4 G/DL (ref 13.2–17.1)
LDLC SERPL CALC-MCNC: 41 MG/DL (CALC)
MCH RBC QN AUTO: 31.2 PG (ref 27–33)
MCHC RBC AUTO-ENTMCNC: 31.2 G/DL (ref 32–36)
MCV RBC AUTO: 100 FL (ref 80–100)
MICROALBUMIN UR-MCNC: 4.9 MG/DL
NONHDLC SERPL-MCNC: 59 MG/DL (CALC)
PLATELET # BLD AUTO: 67 THOUSAND/UL (ref 140–400)
PMV BLD REES-ECKER: ABNORMAL FL
POTASSIUM SERPL-SCNC: 4.2 MMOL/L (ref 3.5–5.3)
PROT SERPL-MCNC: 7.1 G/DL (ref 6.1–8.1)
RBC # BLD AUTO: 5.58 MILLION/UL (ref 4.2–5.8)
SODIUM SERPL-SCNC: 141 MMOL/L (ref 135–146)
TRIGL SERPL-MCNC: 96 MG/DL
WBC # BLD AUTO: 6.1 THOUSAND/UL (ref 3.8–10.8)

## 2025-03-28 ENCOUNTER — APPOINTMENT (OUTPATIENT)
Dept: PAIN MEDICINE | Facility: CLINIC | Age: 70
End: 2025-03-28
Payer: MEDICARE

## 2025-03-31 ENCOUNTER — OFFICE VISIT (OUTPATIENT)
Dept: PAIN MEDICINE | Facility: CLINIC | Age: 70
End: 2025-03-31
Payer: MEDICARE

## 2025-03-31 VITALS
SYSTOLIC BLOOD PRESSURE: 123 MMHG | RESPIRATION RATE: 18 BRPM | BODY MASS INDEX: 27.09 KG/M2 | OXYGEN SATURATION: 90 % | HEIGHT: 72 IN | DIASTOLIC BLOOD PRESSURE: 75 MMHG | WEIGHT: 200 LBS | HEART RATE: 95 BPM

## 2025-03-31 DIAGNOSIS — M47.27 LUMBOSACRAL SPONDYLOSIS WITH RADICULOPATHY: ICD-10-CM

## 2025-03-31 DIAGNOSIS — M48.062 SPINAL STENOSIS OF LUMBAR REGION WITH NEUROGENIC CLAUDICATION: ICD-10-CM

## 2025-03-31 PROCEDURE — 1125F AMNT PAIN NOTED PAIN PRSNT: CPT | Performed by: PHYSICIAN ASSISTANT

## 2025-03-31 PROCEDURE — 99214 OFFICE O/P EST MOD 30 MIN: CPT | Performed by: PHYSICIAN ASSISTANT

## 2025-03-31 PROCEDURE — 1160F RVW MEDS BY RX/DR IN RCRD: CPT | Performed by: PHYSICIAN ASSISTANT

## 2025-03-31 PROCEDURE — 3008F BODY MASS INDEX DOCD: CPT | Performed by: PHYSICIAN ASSISTANT

## 2025-03-31 PROCEDURE — 1036F TOBACCO NON-USER: CPT | Performed by: PHYSICIAN ASSISTANT

## 2025-03-31 PROCEDURE — 3078F DIAST BP <80 MM HG: CPT | Performed by: PHYSICIAN ASSISTANT

## 2025-03-31 PROCEDURE — G2211 COMPLEX E/M VISIT ADD ON: HCPCS | Performed by: PHYSICIAN ASSISTANT

## 2025-03-31 PROCEDURE — 3074F SYST BP LT 130 MM HG: CPT | Performed by: PHYSICIAN ASSISTANT

## 2025-03-31 PROCEDURE — 1159F MED LIST DOCD IN RCRD: CPT | Performed by: PHYSICIAN ASSISTANT

## 2025-03-31 RX ORDER — HYDROCODONE BITARTRATE AND ACETAMINOPHEN 7.5; 325 MG/1; MG/1
1 TABLET ORAL EVERY 6 HOURS PRN
Qty: 120 TABLET | Refills: 0 | Status: SHIPPED | OUTPATIENT
Start: 2025-04-30 | End: 2025-05-30

## 2025-03-31 RX ORDER — HYDROCODONE BITARTRATE AND ACETAMINOPHEN 7.5; 325 MG/1; MG/1
1 TABLET ORAL EVERY 6 HOURS PRN
Qty: 120 TABLET | Refills: 0 | Status: SHIPPED | OUTPATIENT
Start: 2025-03-31 | End: 2025-04-30

## 2025-03-31 RX ORDER — PREGABALIN 200 MG/1
200 CAPSULE ORAL 3 TIMES DAILY
Qty: 90 CAPSULE | Refills: 2 | Status: SHIPPED | OUTPATIENT
Start: 2025-03-31 | End: 2025-06-29

## 2025-03-31 ASSESSMENT — ENCOUNTER SYMPTOMS
FATIGUE: 0
ARTHRALGIAS: 1
SHORTNESS OF BREATH: 0
WHEEZING: 0
PALPITATIONS: 0
CHILLS: 0
ACTIVITY CHANGE: 0
COUGH: 0
FEVER: 0
NECK PAIN: 1
VOICE CHANGE: 0
BACK PAIN: 1
DIARRHEA: 0
NAUSEA: 0
UNEXPECTED WEIGHT CHANGE: 0
SLEEP DISTURBANCE: 0
VOMITING: 0

## 2025-03-31 ASSESSMENT — PAIN SCALES - GENERAL
PAINLEVEL_OUTOF10: 9
PAINLEVEL_OUTOF10: 9

## 2025-03-31 ASSESSMENT — LIFESTYLE VARIABLES
HOW OFTEN DO YOU HAVE SIX OR MORE DRINKS ON ONE OCCASION: NEVER
HOW OFTEN DO YOU HAVE A DRINK CONTAINING ALCOHOL: NEVER

## 2025-03-31 ASSESSMENT — PAIN DESCRIPTION - DESCRIPTORS: DESCRIPTORS: ACHING

## 2025-03-31 ASSESSMENT — PAIN - FUNCTIONAL ASSESSMENT: PAIN_FUNCTIONAL_ASSESSMENT: 0-10

## 2025-03-31 NOTE — PROGRESS NOTES
Subjective   Patient ID: Ha Payne is a 70 y.o. male who presents for Back Pain.  Patient is a 70-year-old male with spinal stenosis neurogenic claudication the presents today for follow-up.  Patient did notes last Wednesday without a specific injury he did notes that he had increased axial back pain.  He denies that it is changed or increased the radicular component.  It had been variable over the past few days he was unable to make his appointment to the acute flare.  Patient did not think that he had to call he just reschedule his appointment.  Patient notes that it seems to be calm down slightly now.  Patient still continues to have aversions to moving forward with any surgical procedure.    Back Pain  Pertinent negatives include no chest pain or fever.       Review of Systems   Constitutional:  Negative for activity change, chills, fatigue, fever and unexpected weight change.   HENT:  Negative for ear pain and voice change.    Eyes:  Negative for visual disturbance.   Respiratory:  Negative for cough, shortness of breath and wheezing.    Cardiovascular:  Negative for chest pain and palpitations.   Gastrointestinal:  Negative for diarrhea, nausea and vomiting.   Musculoskeletal:  Positive for arthralgias, back pain, gait problem and neck pain.   Psychiatric/Behavioral:  Negative for behavioral problems, self-injury, sleep disturbance and suicidal ideas.        Objective   Physical Exam    Assessment/Plan   Problem List Items Addressed This Visit             ICD-10-CM    Lumbosacral spondylosis with radiculopathy M47.27    Spinal stenosis of lumbar region with neurogenic claudication M48.062   I had nice discussion with the patient today our plan will be as follows.      Radiology: [ none at this time ]      Physically:  [ continue modification of activities, healthy lifestyle choice, I realized patient is resistant to moving forward with surgical procedure but the severe spinal stenosis could be  continuing and causing his aggravation.  No major changes to his physical exam if patient is having any acute exacerbations treatment options do include potentially a mild Medrol Dosepak.  Although this is because his blood sugar spikes patient's wife is going to contact primary/endocrinologist. ]      Psychologically:  [ No acute psychological concerns. There are no mental health issues of which I am aware that are contributing to the patient's pain. There are no substance abuse or alcohol abuse issues of which I am aware that are contributing to the patient's pain. ]      Medication: [ I will refill the patient's opioids today for 2 month.  The patient continues to see benefit and improvement in their quality of life and ability to maintain ADLs.  Patient educated about the risks of taking opioids and operating a motor vehicle.  Patient reports no adverse side effects to current medication regimen.  Current regimen does allow patient to maintain ADLs.  Patient reports no new neurologic symptoms, new pain areas, or exacerbation in pain today.  Patient reports they are happy with current treatment care path.    [place] was reviewed and was consistent with the history.    Patient has been educated on the risks, benefits, and alternatives of controlled substances as well as the proper way to store these medications.  The patient and I discussed the nature of this medication and its side effects.  We discussed tolerance, physical dependence, psychological dependence, addiction and opioid-induced hyperalgesia.  We discussed the potential need to wean from this medication.  We discussed the availability of programs that can help with this process if necessary.  We discussed safety issues related to opioids including safe storage.  We discussed the fact that the patient should not drive an automobile or operate heavy machinery while taking this medication.  A prescription for naloxone was offered to the patient.  The  patient will be re-evaluated for the need to continue opioid therapy in 60 days. ]      Duration:  [ 2 months ]      Intervention:  [ none at this time. Patient is stable.  ]        Please note that this report has been produced using speech recognition software. It may contain errors related to grammar, punctuation or spelling. Electronically signed, but not reviewed.            Best Gonzalez PA-C 03/31/25 10:25 AM

## 2025-03-31 NOTE — PROGRESS NOTES
MEDICATION NAME: Norco  STRENGTH: 7.5-325  LAST FILL DATE: 3/1/25  DATE LAST TAKEN: 3/29/25  QUANTITY FILLED: 120  QUANTITY REMAININ  COUNT COMPLETED BY: TRENTON RINCON and MATTHEW RANDOLPH      UDS LAST COMPLETED:   CONTROLLED SUBSTANCES AGREEMENT LAST SIGNED:   ORT LAST COMPLETED:  Modified [employer] disability form filled out annually.

## 2025-04-08 ENCOUNTER — HOSPITAL ENCOUNTER (OUTPATIENT)
Dept: CARDIOLOGY | Facility: CLINIC | Age: 70
Discharge: HOME | End: 2025-04-08
Payer: MEDICARE

## 2025-04-08 DIAGNOSIS — Z95.810 PRESENCE OF AUTOMATIC (IMPLANTABLE) CARDIAC DEFIBRILLATOR: ICD-10-CM

## 2025-04-08 DIAGNOSIS — I42.5 OTHER RESTRICTIVE CARDIOMYOPATHY: ICD-10-CM

## 2025-05-05 DIAGNOSIS — I50.22 CHRONIC SYSTOLIC CONGESTIVE HEART FAILURE: ICD-10-CM

## 2025-05-05 RX ORDER — CARVEDILOL 25 MG/1
25 TABLET ORAL
Qty: 180 TABLET | Refills: 3 | Status: SHIPPED | OUTPATIENT
Start: 2025-05-05

## 2025-05-07 ENCOUNTER — HOSPITAL ENCOUNTER (OUTPATIENT)
Dept: CARDIOLOGY | Facility: CLINIC | Age: 70
Discharge: HOME | End: 2025-05-07
Payer: MEDICARE

## 2025-05-07 DIAGNOSIS — I42.5 OTHER RESTRICTIVE CARDIOMYOPATHY: ICD-10-CM

## 2025-05-07 DIAGNOSIS — Z95.810 PRESENCE OF AUTOMATIC (IMPLANTABLE) CARDIAC DEFIBRILLATOR: ICD-10-CM

## 2025-05-07 PROCEDURE — 93296 REM INTERROG EVL PM/IDS: CPT

## 2025-05-18 DIAGNOSIS — I25.10 CORONARY ARTERY DISEASE, UNSPECIFIED VESSEL OR LESION TYPE, UNSPECIFIED WHETHER ANGINA PRESENT, UNSPECIFIED WHETHER NATIVE OR TRANSPLANTED HEART: ICD-10-CM

## 2025-05-19 RX ORDER — TORSEMIDE 20 MG/1
20 TABLET ORAL DAILY
Qty: 90 TABLET | Refills: 2 | Status: SHIPPED | OUTPATIENT
Start: 2025-05-19

## 2025-05-22 ENCOUNTER — APPOINTMENT (OUTPATIENT)
Dept: PRIMARY CARE | Facility: CLINIC | Age: 70
End: 2025-05-22
Payer: MEDICARE

## 2025-05-22 VITALS
OXYGEN SATURATION: 86 % | DIASTOLIC BLOOD PRESSURE: 66 MMHG | HEART RATE: 78 BPM | BODY MASS INDEX: 24.77 KG/M2 | SYSTOLIC BLOOD PRESSURE: 100 MMHG | WEIGHT: 182.6 LBS

## 2025-05-22 DIAGNOSIS — E11.9 TYPE 2 DIABETES MELLITUS WITHOUT COMPLICATION, WITH LONG-TERM CURRENT USE OF INSULIN: ICD-10-CM

## 2025-05-22 DIAGNOSIS — C38.3 MALIGNANT NEOPLASM OF MEDIASTINUM, PART UNSPECIFIED (MULTI): ICD-10-CM

## 2025-05-22 DIAGNOSIS — D69.6 THROMBOCYTOPENIA: ICD-10-CM

## 2025-05-22 DIAGNOSIS — E53.8 B12 DEFICIENCY: ICD-10-CM

## 2025-05-22 DIAGNOSIS — Z00.00 MEDICARE ANNUAL WELLNESS VISIT, SUBSEQUENT: Primary | ICD-10-CM

## 2025-05-22 DIAGNOSIS — Z11.59 ENCOUNTER FOR HEPATITIS C SCREENING TEST FOR LOW RISK PATIENT: ICD-10-CM

## 2025-05-22 DIAGNOSIS — D75.1 POLYCYTHEMIA: ICD-10-CM

## 2025-05-22 DIAGNOSIS — Z79.4 TYPE 2 DIABETES MELLITUS WITH OTHER SPECIFIED COMPLICATION, WITH LONG-TERM CURRENT USE OF INSULIN: ICD-10-CM

## 2025-05-22 DIAGNOSIS — Z79.4 TYPE 2 DIABETES MELLITUS WITHOUT COMPLICATION, WITH LONG-TERM CURRENT USE OF INSULIN: ICD-10-CM

## 2025-05-22 DIAGNOSIS — I25.10 CORONARY ARTERY DISEASE DUE TO LIPID RICH PLAQUE: ICD-10-CM

## 2025-05-22 DIAGNOSIS — E53.8 FOLATE DEFICIENCY: ICD-10-CM

## 2025-05-22 DIAGNOSIS — R53.83 OTHER FATIGUE: ICD-10-CM

## 2025-05-22 DIAGNOSIS — R06.2 WHEEZING: ICD-10-CM

## 2025-05-22 DIAGNOSIS — R09.02 HYPOXIA: ICD-10-CM

## 2025-05-22 DIAGNOSIS — I25.83 CORONARY ARTERY DISEASE DUE TO LIPID RICH PLAQUE: ICD-10-CM

## 2025-05-22 DIAGNOSIS — I47.20 VENTRICULAR TACHYCARDIA (MULTI): ICD-10-CM

## 2025-05-22 DIAGNOSIS — E11.69 TYPE 2 DIABETES MELLITUS WITH OTHER SPECIFIED COMPLICATION, WITH LONG-TERM CURRENT USE OF INSULIN: ICD-10-CM

## 2025-05-22 DIAGNOSIS — E55.9 MILD VITAMIN D DEFICIENCY: ICD-10-CM

## 2025-05-22 DIAGNOSIS — R59.0 MEDIASTINAL ADENOPATHY: ICD-10-CM

## 2025-05-22 DIAGNOSIS — D32.9 MENINGIOMA (MULTI): ICD-10-CM

## 2025-05-22 PROCEDURE — 3078F DIAST BP <80 MM HG: CPT | Performed by: STUDENT IN AN ORGANIZED HEALTH CARE EDUCATION/TRAINING PROGRAM

## 2025-05-22 PROCEDURE — 1123F ACP DISCUSS/DSCN MKR DOCD: CPT | Performed by: STUDENT IN AN ORGANIZED HEALTH CARE EDUCATION/TRAINING PROGRAM

## 2025-05-22 PROCEDURE — G0439 PPPS, SUBSEQ VISIT: HCPCS | Performed by: STUDENT IN AN ORGANIZED HEALTH CARE EDUCATION/TRAINING PROGRAM

## 2025-05-22 PROCEDURE — 3074F SYST BP LT 130 MM HG: CPT | Performed by: STUDENT IN AN ORGANIZED HEALTH CARE EDUCATION/TRAINING PROGRAM

## 2025-05-22 PROCEDURE — 1170F FXNL STATUS ASSESSED: CPT | Performed by: STUDENT IN AN ORGANIZED HEALTH CARE EDUCATION/TRAINING PROGRAM

## 2025-05-22 PROCEDURE — 1159F MED LIST DOCD IN RCRD: CPT | Performed by: STUDENT IN AN ORGANIZED HEALTH CARE EDUCATION/TRAINING PROGRAM

## 2025-05-22 PROCEDURE — 1125F AMNT PAIN NOTED PAIN PRSNT: CPT | Performed by: STUDENT IN AN ORGANIZED HEALTH CARE EDUCATION/TRAINING PROGRAM

## 2025-05-22 PROCEDURE — 99215 OFFICE O/P EST HI 40 MIN: CPT | Performed by: STUDENT IN AN ORGANIZED HEALTH CARE EDUCATION/TRAINING PROGRAM

## 2025-05-22 RX ORDER — ALBUTEROL SULFATE 90 UG/1
1 INHALANT RESPIRATORY (INHALATION) ONCE
Status: CANCELLED | OUTPATIENT
Start: 2025-05-22

## 2025-05-22 RX ORDER — ALBUTEROL SULFATE 0.83 MG/ML
3 SOLUTION RESPIRATORY (INHALATION) ONCE
Status: CANCELLED | OUTPATIENT
Start: 2025-05-22 | End: 2025-05-22

## 2025-05-22 ASSESSMENT — PATIENT HEALTH QUESTIONNAIRE - PHQ9
SUM OF ALL RESPONSES TO PHQ9 QUESTIONS 1 AND 2: 0
2. FEELING DOWN, DEPRESSED OR HOPELESS: NOT AT ALL
SUM OF ALL RESPONSES TO PHQ9 QUESTIONS 1 AND 2: 0
1. LITTLE INTEREST OR PLEASURE IN DOING THINGS: NOT AT ALL
2. FEELING DOWN, DEPRESSED OR HOPELESS: NOT AT ALL
1. LITTLE INTEREST OR PLEASURE IN DOING THINGS: NOT AT ALL

## 2025-05-22 ASSESSMENT — ACTIVITIES OF DAILY LIVING (ADL)
BATHING: INDEPENDENT
TAKING_MEDICATION: INDEPENDENT
DRESSING: INDEPENDENT
MANAGING_FINANCES: INDEPENDENT
DOING_HOUSEWORK: INDEPENDENT
GROCERY_SHOPPING: NEEDS ASSISTANCE

## 2025-05-22 ASSESSMENT — PAIN SCALES - GENERAL: PAINLEVEL_OUTOF10: 8

## 2025-05-22 ASSESSMENT — ENCOUNTER SYMPTOMS
DEPRESSION: 0
LOSS OF SENSATION IN FEET: 0
OCCASIONAL FEELINGS OF UNSTEADINESS: 0

## 2025-05-22 NOTE — PATIENT INSTRUCTIONS
- COVID booster advised  - RSV: advised   - PCV-20 (pneumonia vaccine)   - Shingrix (shingles)     CT Chest withOUT contrast    Pulmonary Function Testing given low oxygen levels     Call [phone] to schedule

## 2025-05-22 NOTE — PROGRESS NOTES
Ha Payne is a 70 y.o. male seen in Clinic at [facility] [place] Bill Sue on 05/22/25 for routine care, as well as for management of the following chronic medical conditions: CAD, HFrEF (s/p ICD in 2013; apical mural thrombus in 2016), T2DM, Spinal Stenosis, GERD, Meningioma, Thrombocytopenia. Patient is accompanied to this visit by his wife. He presents for MCW/CPE.    He reports no acute concerns today. On arrival, patient is satting 86% on vital assessment. He denies any shortness of breath at rest or with exertion. His O2 sats upon review of prior visits in office are frequently in mid-80s (July, December 2024 evaluations).     He is quite healthcare averse and largely lacking in willingness to further workup/evaluate/pursue diagnostic or therapeutic interventions for his many chronic illnesses. Regarding the hypoxia, longstanding mediastinal adenopathy, concern for malignancy, a PET/CT was advised but unable to be completed. He has no PFTs on file and did not pursue after last evaluation when recommended. Attempt updated assessment, as well as at least surveillance CT chest at this time. Formal pulmonary referral, bronchoscopy, recurrent PET-CT attempt all deferred by patient at this time. He also defers assessment at higher level of care based on today's in office hypoxia (I.e. ER), which is not unreasonable given the appearance this is not a new and more chronic findings.     Vaccine guidance to help with chronic hypoxia and lung disease offered, including advocating for COVID booster, RSV, PCV-20, and annual Flu shot (seasonally).     Related to CAD and HFrEF history, he continues to follow closely with his long time cardiologist, Dr. Larsen. His weight is 182 in office, does not appear volume overloaded. Has follow up with him next month.     #Wheezing  #Remote Smoking History   #Mediastinal Adenopathy  #?Hypoxia   - seen on prior CT chest from 2022  - CT with enlarged lymph nodes and lung nodule  --> attempted PET CT, patient was unable to lie down due to pain --> Bronchoscopy recommended, however patient declined.  -Repeat CT non contrast for surveillance  -ABG  -PFTs    #CAD (remote MI s/p PCI)  #HFrEF (30-35%, ICD in place; defers elective generation change out)   #History of Apical Mural Thrombus (s/p AC)   - follows with cardiology, Dr. Larsen   - ASA 81mg   - B-Blocker: Carvedilol 25mg BID  - ARNI/ACE/ARB: Entresto 97-103mg  - MRA: Eplerenone 50mg daily   - SGLT2i: he defers   - Diuretic: Torsemide 20mg daily   - Statin: Ezetimibe-Simvastatin 10-40mg  - AC: NONE at present (prior iso apical mural thrombus in 2016)   - Last Lipid panel: LDL 41, TG <100 on current regimen per labs 03/2025   - Last Echo in 2022, 30-35%, LV dilation     #T2DM  - follows with endo  - A1C 8.1% March 2025  - Slightly elevated urine albumin 03/2025; defers SGLT2i   - Current Regimen: Glipizide 10mg daily (PM dosing 5mg stopped in setting of AM hypoglycemia), Glargine 20 units daily approximately (Metformin intolerant, has refused SGLT-2)  - has CGM     #Spinal Stenosis   - follows with pain management    - Lyrica 200mg TID, Norco 7.5-325mg Q6H PRN, Tylenol     #GERD  - H2 blocker     #Polycythemia   #Thrombocytopenia   - stable, persistent   - lab evaluation done including CBCd with smear, HIV and hepatitis screening, coags-->myeloid malignancy panel unremarkable  [  ] suspect iso chronic hypoxia, possible underlying/evolving malignancy  [  ] heme referral offered, he has scheduled for July   [  ] B12 supplement advised     #Meningioma   - seen on CT scan from 2022   - around 3cm at that time  - ICD not MRI compatible per report  - repeat CT scan in 2024 not pursued (ordered)   [  ] updated imaging CT scan without contrast (MRI non-compatible per history and patient; message cardiology to indeed clarify); he ultimately did not pursue, re-address at subsequent visits    Past Medical History: as above   Past Medical History:  "  Diagnosis Date    Acute ischemic heart disease, unspecified 06/27/2022    Acute coronary syndrome    Back pain     Intracardiac thrombosis, not elsewhere classified 08/12/2022    Apical mural thrombus    Old myocardial infarction 08/12/2022    History of acute myocardial infarction    Opioid dependence, uncomplicated (Multi) 10/03/2013    Opioid dependence    Other conditions influencing health status 04/17/2013    Disorder Of The Pelvis / Hip Joint / Femur    Other conditions influencing health status 04/17/2013    Toxicity From Drugs, Medicaments, Or Biological Substances     Subspecialty Medical Care: Cardiology, Endocrinology, Pain Medicine, Ophthalmology, Podiatry, Heme/Onc     Past Surgical History:   - CAD with stenting   No past surgical history on file.    Medications:  Current Outpatient Medications:     acetaminophen (Tylenol) 325 mg tablet, Take 1-2 tablets (325-650 mg) by mouth every 4 hours if needed., Disp: , Rfl:     aspirin 81 mg chewable tablet, Chew 1 tablet (81 mg) once every 24 hours., Disp: , Rfl:     BD Ultra-Fine Mini Pen Needle 31 gauge x 3/16\" needle, 1 each., Disp: , Rfl:     carvedilol (Coreg) 25 mg tablet, TAKE ONE TABLET BY MOUTH TWO TIMES A DAY with meals, Disp: 180 tablet, Rfl: 3    cholecalciferol (Vitamin D-3) 25 MCG (1000 UT) capsule, Take 1 capsule (25 mcg) by mouth once daily., Disp: , Rfl:     eplerenone (Inspra) 50 mg tablet, TAKE ONE TABLET BY MOUTH EVERY DAY, Disp: 90 tablet, Rfl: 3    ezetimibe-simvastatin (Vytorin) 10-40 mg tablet, TAKE ONE TABLET BY MOUTH ONCE DAILY AT BEDTIME, Disp: 90 tablet, Rfl: 3    famotidine (Pepcid) 20 mg tablet, Take 1 tablet (20 mg) by mouth if needed., Disp: , Rfl:     glipiZIDE (Glucotrol) 5 mg tablet, Take 3 tablets (15 mg) by mouth once daily. 2 tablets in am and 1 tablet in qpm, Disp: 270 tablet, Rfl: 3    loperamide (Imodium A-D) 2 mg capsule, Take 1 capsule (2 mg) by mouth 4 times a day as needed., Disp: , Rfl:     nitroglycerin " "(Nitrostat) 0.4 mg SL tablet, Place 1 tablet (0.4 mg) under the tongue every 5 minutes if needed for chest pain., Disp: 25 tablet, Rfl: 3    pen needle, diabetic (BD Doreen 2nd Gen Pen Needle) 32 gauge x 5/32\" needle, Use 1x a day, Disp: 100 each, Rfl: 3    pen needle, diabetic (BD ULTRA-FINE MINI PEN NEEDLE MISC), 1 Stick 2 times a day., Disp: , Rfl:     pen needle, diabetic (TechLITE Pen Needle) 31 gauge x 5/16\" needle, Use twice daily with insulin, Disp: 200 each, Rfl: 3    pregabalin (Lyrica) 200 mg capsule, Take 1 capsule (200 mg) by mouth 3 times a day., Disp: 90 capsule, Rfl: 2    torsemide (Demadex) 20 mg tablet, TAKE ONE TABLET BY MOUTH EVERY DAY, Disp: 90 tablet, Rfl: 2    cyanocobalamin (Vitamin B-12) 2,000 mcg tablet, Take 1 tablet (2,000 mcg) by mouth once daily., Disp: 90 tablet, Rfl: 1    Entresto  mg tablet, TAKE 1 TABLET BY MOUTH TWICE A DAY, Disp: 180 tablet, Rfl: 3    folic acid (Folvite) 1 mg tablet, Take 1 tablet (1 mg) by mouth once daily., Disp: 90 tablet, Rfl: 1    [START ON 6/29/2025] HYDROcodone-acetaminophen (Norco) 7.5-325 mg tablet, Take 1 tablet by mouth every 6 hours if needed for severe pain (7 - 10). Do not fill before June 29, 2025., Disp: 120 tablet, Rfl: 0    HYDROcodone-acetaminophen (Norco) 7.5-325 mg tablet, Take 1 tablet by mouth every 6 hours if needed for severe pain (7 - 10)., Disp: 120 tablet, Rfl: 0    insulin glargine (Toujeo Solostar- 1 unit dial) 300 unit/mL (1.5 mL) injection, Inject 26 Units under the skin once daily in the morning. Take as directed per insulin instructions., Disp: 7.8 mL, Rfl: 3  Pharmacy: [facility] ([place])     Allergies:   Allergies   Allergen Reactions    Amoxicillin-Pot Clavulanate Swelling and Unknown    Prochlorperazine Unknown and Other     Pt states he has spasms.    Brilinta [Ticagrelor] Rash     Immunizations:   - Flu shot - annually advised   - COVID booster advised  - RSV: advised   - Tdap due 2029  - PCV-20 advised   - " Shingrix: advised     Family History:   Family History   Problem Relation Name Age of Onset    Diabetes Brother       Social History:   Home/Living Situation/Falls/Safety Assessment: lives at home with wife  Education/Employment/Work/Vocational: retired, worked for [employer]); from [place]   Activities: limited by chronic back pain   Drug Use: prior smoker, quit around 20 years ago  Diet: healthy dietary habits discussed   Depression/Anxiety: positive in setting of chronic health conditions, though he defers further evaluation related to this   Sexuality/Contraception/Menstrual History:    Sleep: pain affects his sleep     Patient Information:  Health Insurance: Medicare   Transportation: wife   Healthcare POA/Guardian: wife   Contact Information: correct in EMR     Visit Vitals  /66 (BP Location: Right arm, Patient Position: Sitting, BP Cuff Size: Adult)   Pulse 78   Wt 82.8 kg (182 lb 9.6 oz)   SpO2 (!) 86%   BMI 24.77 kg/m²   Smoking Status Former   BSA 2.05 m²      PHYSICAL EXAM:   General: non-toxic appearing  male, NAD, pleasant and engaged in encounter    HEENT: NCAT, MMM, corrective lenses in place   CV: RRR, no m/r/g  PULM: rales bilaterally with faint crackles, non-labored respirations   ABD: soft, NT, ND  : no suprapubic or CVA tenderness   EXT: WWP, no significant edema   SKIN: Acrocyanosis. no rashes noted   NEURO: A&Ox4, symmetric facies, gait and posture severely impacted by chronic back pain (hunched over positioning, uses cane for assistance/ambulation)   PSYCH: pleasant mood, appropriate affect     Assessment/Plan    Ha Payne is a 70 y.o. male seen in Clinic at [facility] [place] by Dr. Bill Sue on 05/22/25 for routine care, as well as for management of the following chronic medical conditions: CAD, HFrEF (s/p ICD in 2013; apical mural thrombus in 2016), T2DM, Spinal Stenosis, GERD, Meningioma, Thrombocytopenia. Patient is accompanied to this visit by his  wife. He presents for MCW/CPE.    He reports no acute concerns today. On arrival, patient is satting 86% on vital assessment. He denies any shortness of breath at rest or with exertion. His O2 sats upon review of prior visits in office are frequently in mid-80s (July, December 2024 evaluations).     He is quite healthcare averse and largely lacking in willingness to further workup/evaluate/pursue diagnostic or therapeutic interventions for his many chronic illnesses. Regarding the hypoxia, longstanding mediastinal adenopathy, concern for malignancy, a PET/CT was advised but unable to be completed. He has no PFTs on file and did not pursue after last evaluation when recommended. Attempt updated assessment, as well as at least surveillance CT chest at this time. Formal pulmonary referral, bronchoscopy, recurrent PET-CT attempt all deferred by patient at this time. He also defers assessment at higher level of care based on today's in office hypoxia (I.e. ER), which is not unreasonable given the appearance this is not a new and more chronic findings.     Vaccine guidance to help with chronic hypoxia and lung disease offered, including advocating for COVID booster, RSV, PCV-20, and annual Flu shot (seasonally).     Related to CAD and HFrEF history, he continues to follow closely with his long time cardiologist, Dr. Larsen. His weight is 182 in office, does not appear volume overloaded. Has follow up with him next month.     #Wheezing  #Remote Smoking History   #Mediastinal Adenopathy  #?Hypoxia   - seen on prior CT chest from 2022  - CT with enlarged lymph nodes and lung nodule --> attempted PET CT, patient was unable to lie down due to pain --> Bronchoscopy recommended, however patient declined.  -Repeat CT non contrast for surveillance  -ABG  -PFTs    #CAD (remote MI s/p PCI)  #HFrEF (30-35%, ICD in place; defers elective generation change out)   #History of Apical Mural Thrombus (s/p AC)   - follows with cardiology,  Dr. Larsen   - ASA 81mg   - B-Blocker: Carvedilol 25mg BID  - ARNI/ACE/ARB: Entresto 97-103mg  - MRA: Eplerenone 50mg daily   - SGLT2i: he defers   - Diuretic: Torsemide 20mg daily   - Statin: Ezetimibe-Simvastatin 10-40mg  - AC: NONE at present (prior iso apical mural thrombus in 2016)   - Last Lipid panel: LDL 41, TG <100 on current regimen per labs 03/2025   - Last Echo in 2022, 30-35%, LV dilation     #T2DM  - follows with endo  - A1C 8.1% March 2025  - Slightly elevated urine albumin 03/2025; defers SGLT2i   - Current Regimen: Glipizide 10mg daily (PM dosing 5mg stopped in setting of AM hypoglycemia), Glargine 20 units daily approximately (Metformin intolerant, has refused SGLT-2)  - has CGM     #Spinal Stenosis   - follows with pain management    - Lyrica 200mg TID, Norco 7.5-325mg Q6H PRN, Tylenol     #GERD  - H2 blocker     #Polycythemia   #Thrombocytopenia   - stable, persistent   - lab evaluation done including CBCd with smear, HIV and hepatitis screening, coags-->myeloid malignancy panel unremarkable  [  ] suspect iso chronic hypoxia, possible underlying/evolving malignancy  [  ] heme referral offered, he has scheduled for July   [  ] B12 supplement advised     #Meningioma   - seen on CT scan from 2022   - around 3cm at that time  - ICD not MRI compatible per report  - repeat CT scan in 2024 not pursued (ordered)   [  ] updated imaging CT scan without contrast (MRI non-compatible per history and patient; message cardiology to indeed clarify); he ultimately did not pursue, re-address at subsequent visits    #Health Maintenance  CPE/MCW visit: 05/2025     Cancer Screening  - Colorectal Cancer Screening: REFUSES any type of screening   - Lung Cancer Screening: not candidate   - Prostate Cancer Screening: WNL 09/2024     Laboratory Screening  - Lipid Screen: at goal   - ASCVD Score: CAD  - A1C, glucose screen: 8.1% in 03/2025   - STI, HIV, Hep B screen: negative 2024   - Hep C screen: negative 2024      Imaging Screening  - AAA screening: negative 02/2021     Immunizations:   - Flu shot - annually advised   - COVID booster advised  - RSV: advised   - Tdap due 2029  - PCV-20 advised   - Shingrix: advised     Other Screening  - Health Literacy Assessment: adequate   - Depression screen: positive   - Home safety/partner violence screen: negative   - Hearing/Vision screens: corrective lenses, follows with optho (T2DM)  - Alcohol/tobacco/drug use screen: prior smoker   - Healthcare POA/Advanced Directives: wife     Referrals:   - labs  - CT head   - CT chest  - PFTs  - REFUSES Colon Cancer Screening  - Continues to refuse/defer SGLT-2   - Heme referral   - B12 supplementation     Return to clinic in 3-6 months for follow-up.    Patient Discussion:    Please call back the office with any questions at [phone]. In the case of an emergency, please call [phone] or go to the nearest Emergency Department.      Richie Oliver MD  Internal Medicine-Pediatrics PGY2  Epic Chat    Patient seen, discussed and examined with Dr. Bill Sue MD  Internal Medicine-Pediatrics  [facility] [place] [address], [address]  P: [phone], F: [phone]

## 2025-05-27 DIAGNOSIS — I50.22 CHRONIC SYSTOLIC CONGESTIVE HEART FAILURE: ICD-10-CM

## 2025-05-27 RX ORDER — SACUBITRIL AND VALSARTAN 97; 103 MG/1; MG/1
1 TABLET, FILM COATED ORAL 2 TIMES DAILY
Qty: 180 TABLET | Refills: 3 | Status: SHIPPED | OUTPATIENT
Start: 2025-05-27

## 2025-05-30 ENCOUNTER — OFFICE VISIT (OUTPATIENT)
Dept: PAIN MEDICINE | Facility: CLINIC | Age: 70
End: 2025-05-30
Payer: MEDICARE

## 2025-05-30 VITALS
SYSTOLIC BLOOD PRESSURE: 108 MMHG | BODY MASS INDEX: 24.65 KG/M2 | HEIGHT: 72 IN | DIASTOLIC BLOOD PRESSURE: 66 MMHG | RESPIRATION RATE: 16 BRPM | WEIGHT: 182 LBS

## 2025-05-30 DIAGNOSIS — Z79.899 HISTORY OF ONGOING TREATMENT WITH HIGH-RISK MEDICATION: Primary | ICD-10-CM

## 2025-05-30 DIAGNOSIS — M47.27 LUMBOSACRAL SPONDYLOSIS WITH RADICULOPATHY: ICD-10-CM

## 2025-05-30 DIAGNOSIS — M48.062 SPINAL STENOSIS OF LUMBAR REGION WITH NEUROGENIC CLAUDICATION: ICD-10-CM

## 2025-05-30 PROCEDURE — 3008F BODY MASS INDEX DOCD: CPT | Performed by: PHYSICIAN ASSISTANT

## 2025-05-30 PROCEDURE — 99214 OFFICE O/P EST MOD 30 MIN: CPT | Performed by: PHYSICIAN ASSISTANT

## 2025-05-30 PROCEDURE — 1036F TOBACCO NON-USER: CPT | Performed by: PHYSICIAN ASSISTANT

## 2025-05-30 PROCEDURE — 3078F DIAST BP <80 MM HG: CPT | Performed by: PHYSICIAN ASSISTANT

## 2025-05-30 PROCEDURE — 1160F RVW MEDS BY RX/DR IN RCRD: CPT | Performed by: PHYSICIAN ASSISTANT

## 2025-05-30 PROCEDURE — 3074F SYST BP LT 130 MM HG: CPT | Performed by: PHYSICIAN ASSISTANT

## 2025-05-30 PROCEDURE — 1159F MED LIST DOCD IN RCRD: CPT | Performed by: PHYSICIAN ASSISTANT

## 2025-05-30 PROCEDURE — 1125F AMNT PAIN NOTED PAIN PRSNT: CPT | Performed by: PHYSICIAN ASSISTANT

## 2025-05-30 PROCEDURE — G2211 COMPLEX E/M VISIT ADD ON: HCPCS | Performed by: PHYSICIAN ASSISTANT

## 2025-05-30 RX ORDER — HYDROCODONE BITARTRATE AND ACETAMINOPHEN 7.5; 325 MG/1; MG/1
1 TABLET ORAL EVERY 6 HOURS PRN
Qty: 120 TABLET | Refills: 0 | Status: SHIPPED | OUTPATIENT
Start: 2025-06-29 | End: 2025-07-29

## 2025-05-30 RX ORDER — HYDROCODONE BITARTRATE AND ACETAMINOPHEN 7.5; 325 MG/1; MG/1
1 TABLET ORAL EVERY 6 HOURS PRN
Qty: 120 TABLET | Refills: 0 | Status: SHIPPED | OUTPATIENT
Start: 2025-05-30 | End: 2025-06-29

## 2025-05-30 ASSESSMENT — ENCOUNTER SYMPTOMS
NAUSEA: 0
NECK PAIN: 1
SLEEP DISTURBANCE: 0
VOMITING: 0
ARTHRALGIAS: 1
DIARRHEA: 0
BACK PAIN: 1
COUGH: 0
CHILLS: 0
WHEEZING: 0
FEVER: 0
VOICE CHANGE: 0
UNEXPECTED WEIGHT CHANGE: 0
SHORTNESS OF BREATH: 0
PALPITATIONS: 0
FATIGUE: 0
ACTIVITY CHANGE: 0

## 2025-05-30 ASSESSMENT — PAIN SCALES - GENERAL
PAINLEVEL_OUTOF10: 8
PAINLEVEL_OUTOF10: 8

## 2025-05-30 ASSESSMENT — PATIENT HEALTH QUESTIONNAIRE - PHQ9
1. LITTLE INTEREST OR PLEASURE IN DOING THINGS: NOT AT ALL
2. FEELING DOWN, DEPRESSED OR HOPELESS: NOT AT ALL
SUM OF ALL RESPONSES TO PHQ9 QUESTIONS 1 AND 2: 0

## 2025-05-30 ASSESSMENT — PAIN DESCRIPTION - DESCRIPTORS: DESCRIPTORS: ACHING

## 2025-05-30 ASSESSMENT — PAIN - FUNCTIONAL ASSESSMENT: PAIN_FUNCTIONAL_ASSESSMENT: 0-10

## 2025-05-30 NOTE — PROGRESS NOTES
MEDICATION NAME: NORCO  STRENGTH: 7.5/325MG  LAST FILL DATE: 25  DATE LAST TAKEN: 25  QUANTITY FILLED: 120  QUANTITY REMAININ  COUNT COMPLETED BY: DEANNA ALFARO RN and ALEN RINCON      UDS [id number]  CONTROLLED SUBSTANCES AGREEMENT SIGNED2024  ORT [id number]  Modified Oswestry disability form filled out annually.

## 2025-05-30 NOTE — PROGRESS NOTES
Subjective   Patient ID: Ha Payne is a 70 y.o. male who presents for Back Pain.  Patient is a 70-year-old male here today for follow-up.  Continues to suffer from a spinal stenosis and neurogenic claudication.  Continues to use ambulatory devices for short distances says and wheelchair for long distances he is continually in his counseling treatments.  He denies any injuries traumas or adverse reactions.        Review of Systems   Constitutional:  Negative for activity change, chills, fatigue, fever and unexpected weight change.   HENT:  Negative for ear pain and voice change.    Eyes:  Negative for visual disturbance.   Respiratory:  Negative for cough, shortness of breath and wheezing.    Cardiovascular:  Negative for chest pain and palpitations.   Gastrointestinal:  Negative for diarrhea, nausea and vomiting.   Musculoskeletal:  Positive for arthralgias, back pain, gait problem and neck pain.   Psychiatric/Behavioral:  Negative for behavioral problems, self-injury, sleep disturbance and suicidal ideas.        Objective   Physical Exam  Vitals reviewed.   Constitutional:       Appearance: Normal appearance.   HENT:      Head: Normocephalic and atraumatic.      Mouth/Throat:      Mouth: Mucous membranes are moist.   Neck:      Vascular: No JVD.   Pulmonary:      Effort: Pulmonary effort is normal. No tachypnea or bradypnea.   Abdominal:      Palpations: Abdomen is soft.   Musculoskeletal:      Cervical back: Tenderness present. No spasms. Decreased range of motion.      Lumbar back: Spasms and tenderness present. Decreased range of motion. Positive right straight leg raise test and positive left straight leg raise test.      Comments: Hypoestheisa BLE and single point cane for amb.   Skin:     General: Skin is warm and dry.   Neurological:      Mental Status: He is alert and oriented to person, place, and time.   Psychiatric:         Mood and Affect: Mood normal.         Behavior: Behavior normal. Behavior  is cooperative.         Assessment/Plan   Problem List Items Addressed This Visit           ICD-10-CM    Lumbosacral spondylosis with radiculopathy M47.27    Relevant Medications    HYDROcodone-acetaminophen (Norco) 7.5-325 mg tablet (Start on 6/29/2025)    HYDROcodone-acetaminophen (Norco) 7.5-325 mg tablet    Spinal stenosis of lumbar region with neurogenic claudication M48.062    Relevant Medications    HYDROcodone-acetaminophen (Norco) 7.5-325 mg tablet (Start on 6/29/2025)    HYDROcodone-acetaminophen (Norco) 7.5-325 mg tablet     Other Visit Diagnoses         Codes      History of ongoing treatment with high-risk medication    -  Primary Z79.899    Relevant Orders    Opiate/Opioid/Benzo Prescription Compliance        I had nice discussion with the patient today our plan will be as follows.      Radiology: [ none at this time ]      Physically:  [ continue modification of activities, healthy lifestyle choice ]      Psychologically:  [ No acute psychological concerns. There are no mental health issues of which I am aware that are contributing to the patient's pain. There are no substance abuse or alcohol abuse issues of which I am aware that are contributing to the patient's pain. ]      Medication: [ I will refill the patient's opioids today for 2 month.  The patient continues to see benefit and improvement in their quality of life and ability to maintain ADLs.  Patient educated about the risks of taking opioids and operating a motor vehicle.  Patient reports no adverse side effects to current medication regimen.  Current regimen does allow patient to maintain ADLs.  Patient reports no new neurologic symptoms, new pain areas, or exacerbation in pain today.  Patient reports they are happy with current treatment care path.    OARRS was reviewed and was consistent with the history.    Patient has been educated on the risks, benefits, and alternatives of controlled substances as well as the proper way to store  these medications.  The patient and I discussed the nature of this medication and its side effects.  We discussed tolerance, physical dependence, psychological dependence, addiction and opioid-induced hyperalgesia.  We discussed the potential need to wean from this medication.  We discussed the availability of programs that can help with this process if necessary.  We discussed safety issues related to opioids including safe storage.  We discussed the fact that the patient should not drive an automobile or operate heavy machinery while taking this medication.  A prescription for naloxone was offered to the patient.  The patient will be re-evaluated for the need to continue opioid therapy in 60 days.   Patient is submit sample for tox cream]      Duration:  [ 2 months ]      Intervention:  [ none at this time. Patient is stable.  ]        Please note that this report has been produced using speech recognition software. It may contain errors related to grammar, punctuation or spelling. Electronically signed, but not reviewed.            Best Gonzalez PA-C 05/30/25 10:53 AM

## 2025-06-01 LAB
1OH-MIDAZOLAM UR-MCNC: NORMAL NG/ML
7AMINOCLONAZEPAM UR-MCNC: NORMAL NG/ML
A-OH ALPRAZ UR-MCNC: NORMAL NG/ML
A-OH-TRIAZOLAM UR-MCNC: NORMAL NG/ML
AMPHETAMINES UR QL: NEGATIVE NG/ML
BARBITURATES UR QL: NEGATIVE NG/ML
BZE UR QL: NEGATIVE NG/ML
CODEINE UR-MCNC: NORMAL NG/ML
CREAT UR-MCNC: 107.5 MG/DL
DRUG SCREEN COMMENT UR-IMP: NORMAL
EDDP UR-MCNC: NORMAL NG/ML
FENTANYL UR-MCNC: NEGATIVE NG/ML
HYDROCODONE UR-MCNC: NORMAL UG/ML
HYDROMORPHONE UR-MCNC: NORMAL NG/ML
LORAZEPAM UR-MCNC: NORMAL NG/ML
METHADONE UR-MCNC: NORMAL UG/L
MORPHINE UR-MCNC: NORMAL NG/ML
NORDIAZEPAM UR-MCNC: NORMAL NG/ML
NORFENTANYL UR-MCNC: NEGATIVE NG/ML
NORHYDROCODONE UR CFM-MCNC: NORMAL NG/ML
NOROXYCODONE UR CFM-MCNC: NORMAL NG/ML
NORTRAMADOL UR-MCNC: NEGATIVE NG/ML
OH-ETHYLFLURAZ UR-MCNC: NORMAL NG/ML
OXAZEPAM UR-MCNC: NORMAL UG/ML
OXIDANTS UR QL: NEGATIVE MCG/ML
OXYCODONE UR CFM-MCNC: NORMAL NG/ML
OXYMORPHONE UR CFM-MCNC: NORMAL NG/ML
PCP UR QL: NEGATIVE NG/ML
PH UR: 5.3 [PH] (ref 4.5–9)
QUEST 6 ACETYLMORPHINE: NORMAL
QUEST NOTES AND COMMENTS: NORMAL
QUEST PATIENT HISTORICAL REPORT: NORMAL
QUEST ZOLPIDEM: NEGATIVE NG/ML
TEMAZEPAM UR-MCNC: NORMAL NG/ML
THC UR QL: NEGATIVE NG/ML
TRAMADOL UR-MCNC: NEGATIVE NG/ML
ZOLPIDEM PHENYL-4-CARB UR CFM-MCNC: NEGATIVE NG/ML

## 2025-06-02 ENCOUNTER — APPOINTMENT (OUTPATIENT)
Dept: PAIN MEDICINE | Facility: CLINIC | Age: 70
End: 2025-06-02
Payer: MEDICARE

## 2025-06-04 LAB
1OH-MIDAZOLAM UR-MCNC: NEGATIVE NG/ML
25(OH)D3+25(OH)D2 SERPL-MCNC: 45 NG/ML (ref 30–100)
7AMINOCLONAZEPAM UR-MCNC: NEGATIVE NG/ML
A-OH ALPRAZ UR-MCNC: NEGATIVE NG/ML
A-OH-TRIAZOLAM UR-MCNC: NEGATIVE NG/ML
ALBUMIN SERPL-MCNC: 4.2 G/DL (ref 3.6–5.1)
ALP SERPL-CCNC: 103 U/L (ref 35–144)
ALT SERPL-CCNC: 14 U/L (ref 9–46)
AMPHETAMINES UR QL: NEGATIVE NG/ML
ANION GAP SERPL CALCULATED.4IONS-SCNC: 13 MMOL/L (CALC) (ref 7–17)
AST SERPL-CCNC: 17 U/L (ref 10–35)
BARBITURATES UR QL: NEGATIVE NG/ML
BASOPHILS # BLD AUTO: 59 CELLS/UL (ref 0–200)
BASOPHILS NFR BLD AUTO: 0.9 %
BILIRUB SERPL-MCNC: 1.1 MG/DL (ref 0.2–1.2)
BUN SERPL-MCNC: 18 MG/DL (ref 7–25)
BZE UR QL: NEGATIVE NG/ML
CALCIUM SERPL-MCNC: 9.4 MG/DL (ref 8.6–10.3)
CHLORIDE SERPL-SCNC: 104 MMOL/L (ref 98–110)
CO2 SERPL-SCNC: 26 MMOL/L (ref 20–32)
CODEINE UR-MCNC: 592 NG/ML
CREAT SERPL-MCNC: 0.88 MG/DL (ref 0.7–1.28)
CREAT UR-MCNC: 107.5 MG/DL
DRUG SCREEN COMMENT UR-IMP: ABNORMAL
EDDP UR-MCNC: NEGATIVE NG/ML
EGFRCR SERPLBLD CKD-EPI 2021: 92 ML/MIN/1.73M2
EOSINOPHIL # BLD AUTO: 403 CELLS/UL (ref 15–500)
EOSINOPHIL NFR BLD AUTO: 6.2 %
ERYTHROCYTE [DISTWIDTH] IN BLOOD BY AUTOMATED COUNT: 18.9 % (ref 11–15)
FENTANYL UR-MCNC: NEGATIVE NG/ML
FOLATE SERPL-MCNC: 3.7 NG/ML
GLUCOSE SERPL-MCNC: 73 MG/DL (ref 65–99)
HCT VFR BLD AUTO: 57.1 % (ref 38.5–50)
HGB BLD-MCNC: 17.6 G/DL (ref 13.2–17.1)
HYDROCODONE UR-MCNC: 70 NG/ML
HYDROMORPHONE UR-MCNC: NEGATIVE NG/ML
LORAZEPAM UR-MCNC: NEGATIVE NG/ML
LYMPHOCYTES # BLD AUTO: 1177 CELLS/UL (ref 850–3900)
LYMPHOCYTES NFR BLD AUTO: 18.1 %
MAGNESIUM SERPL-MCNC: 2.1 MG/DL (ref 1.5–2.5)
MCH RBC QN AUTO: 30.9 PG (ref 27–33)
MCHC RBC AUTO-ENTMCNC: 30.8 G/DL (ref 32–36)
MCV RBC AUTO: 100.2 FL (ref 80–100)
METHADONE UR-MCNC: NEGATIVE NG/ML
MONOCYTES # BLD AUTO: 514 CELLS/UL (ref 200–950)
MONOCYTES NFR BLD AUTO: 7.9 %
MORPHINE UR-MCNC: 278 NG/ML
NEUTROPHILS # BLD AUTO: 4349 CELLS/UL (ref 1500–7800)
NEUTROPHILS NFR BLD AUTO: 66.9 %
NORDIAZEPAM UR-MCNC: NEGATIVE NG/ML
NORFENTANYL UR-MCNC: NEGATIVE NG/ML
NORHYDROCODONE UR CFM-MCNC: 60 NG/ML
NOROXYCODONE UR CFM-MCNC: NEGATIVE NG/ML
NORTRAMADOL UR-MCNC: NEGATIVE NG/ML
OH-ETHYLFLURAZ UR-MCNC: NEGATIVE NG/ML
OXAZEPAM UR-MCNC: NEGATIVE NG/ML
OXIDANTS UR QL: NEGATIVE MCG/ML
OXYCODONE UR CFM-MCNC: NEGATIVE NG/ML
OXYMORPHONE UR CFM-MCNC: NEGATIVE NG/ML
PCP UR QL: NEGATIVE NG/ML
PH UR: 5.3 [PH] (ref 4.5–9)
PHOSPHATE SERPL-MCNC: 4.4 MG/DL (ref 2.1–4.3)
PLATELET # BLD AUTO: 64 THOUSAND/UL (ref 140–400)
PMV BLD REES-ECKER: ABNORMAL FL
POTASSIUM SERPL-SCNC: 4 MMOL/L (ref 3.5–5.3)
PROT SERPL-MCNC: 7 G/DL (ref 6.1–8.1)
QUEST 6 ACETYLMORPHINE: NEGATIVE NG/ML
QUEST NOTES AND COMMENTS: ABNORMAL
QUEST ZOLPIDEM: NEGATIVE NG/ML
RBC # BLD AUTO: 5.7 MILLION/UL (ref 4.2–5.8)
SERVICE CMNT-IMP: ABNORMAL
SODIUM SERPL-SCNC: 143 MMOL/L (ref 135–146)
TEMAZEPAM UR-MCNC: NEGATIVE NG/ML
THC UR QL: NEGATIVE NG/ML
TRAMADOL UR-MCNC: NEGATIVE NG/ML
TSH SERPL-ACNC: 2.64 MIU/L (ref 0.4–4.5)
VIT B12 SERPL-MCNC: 219 PG/ML (ref 200–1100)
WBC # BLD AUTO: 6.5 THOUSAND/UL (ref 3.8–10.8)
ZOLPIDEM PHENYL-4-CARB UR CFM-MCNC: NEGATIVE NG/ML

## 2025-06-04 RX ORDER — CYANOCOBALAMIN (VITAMIN B-12) 2000 MCG
2000 TABLET ORAL DAILY
Qty: 90 TABLET | Refills: 1 | Status: SHIPPED | OUTPATIENT
Start: 2025-06-04

## 2025-06-05 ENCOUNTER — OFFICE VISIT (OUTPATIENT)
Dept: CARDIOLOGY | Facility: CLINIC | Age: 70
End: 2025-06-05
Payer: MEDICARE

## 2025-06-05 ENCOUNTER — HOSPITAL ENCOUNTER (OUTPATIENT)
Dept: CARDIOLOGY | Facility: CLINIC | Age: 70
Discharge: HOME | End: 2025-06-05
Payer: MEDICARE

## 2025-06-05 VITALS
HEIGHT: 72 IN | HEART RATE: 80 BPM | DIASTOLIC BLOOD PRESSURE: 67 MMHG | WEIGHT: 187 LBS | SYSTOLIC BLOOD PRESSURE: 103 MMHG | BODY MASS INDEX: 25.33 KG/M2

## 2025-06-05 DIAGNOSIS — Z95.810 PRESENCE OF AUTOMATIC (IMPLANTABLE) CARDIAC DEFIBRILLATOR: ICD-10-CM

## 2025-06-05 DIAGNOSIS — I25.2 H/O NON-ST ELEVATION MYOCARDIAL INFARCTION (NSTEMI): ICD-10-CM

## 2025-06-05 DIAGNOSIS — I25.10 CORONARY ARTERY DISEASE, UNSPECIFIED VESSEL OR LESION TYPE, UNSPECIFIED WHETHER ANGINA PRESENT, UNSPECIFIED WHETHER NATIVE OR TRANSPLANTED HEART: Primary | ICD-10-CM

## 2025-06-05 DIAGNOSIS — I50.22 CHRONIC SYSTOLIC CONGESTIVE HEART FAILURE: ICD-10-CM

## 2025-06-05 DIAGNOSIS — I42.5 OTHER RESTRICTIVE CARDIOMYOPATHY: ICD-10-CM

## 2025-06-05 LAB — BODY SURFACE AREA: 2.08 M2

## 2025-06-05 PROCEDURE — 3074F SYST BP LT 130 MM HG: CPT | Performed by: INTERNAL MEDICINE

## 2025-06-05 PROCEDURE — 99214 OFFICE O/P EST MOD 30 MIN: CPT | Performed by: INTERNAL MEDICINE

## 2025-06-05 PROCEDURE — 1126F AMNT PAIN NOTED NONE PRSNT: CPT | Performed by: INTERNAL MEDICINE

## 2025-06-05 PROCEDURE — 3078F DIAST BP <80 MM HG: CPT | Performed by: INTERNAL MEDICINE

## 2025-06-05 PROCEDURE — 1159F MED LIST DOCD IN RCRD: CPT | Performed by: INTERNAL MEDICINE

## 2025-06-05 PROCEDURE — 1036F TOBACCO NON-USER: CPT | Performed by: INTERNAL MEDICINE

## 2025-06-05 PROCEDURE — 1160F RVW MEDS BY RX/DR IN RCRD: CPT | Performed by: INTERNAL MEDICINE

## 2025-06-05 PROCEDURE — 3008F BODY MASS INDEX DOCD: CPT | Performed by: INTERNAL MEDICINE

## 2025-06-05 PROCEDURE — 99212 OFFICE O/P EST SF 10 MIN: CPT | Performed by: INTERNAL MEDICINE

## 2025-06-05 PROCEDURE — G2211 COMPLEX E/M VISIT ADD ON: HCPCS | Performed by: INTERNAL MEDICINE

## 2025-06-05 RX ORDER — FOLIC ACID 1 MG/1
1 TABLET ORAL DAILY
Qty: 90 TABLET | Refills: 1 | Status: SHIPPED | OUTPATIENT
Start: 2025-06-05

## 2025-06-05 ASSESSMENT — COLUMBIA-SUICIDE SEVERITY RATING SCALE - C-SSRS
2. HAVE YOU ACTUALLY HAD ANY THOUGHTS OF KILLING YOURSELF?: NO
6. HAVE YOU EVER DONE ANYTHING, STARTED TO DO ANYTHING, OR PREPARED TO DO ANYTHING TO END YOUR LIFE?: NO
1. IN THE PAST MONTH, HAVE YOU WISHED YOU WERE DEAD OR WISHED YOU COULD GO TO SLEEP AND NOT WAKE UP?: NO

## 2025-06-05 ASSESSMENT — ENCOUNTER SYMPTOMS
OCCASIONAL FEELINGS OF UNSTEADINESS: 1
DEPRESSION: 0
LOSS OF SENSATION IN FEET: 1

## 2025-06-05 ASSESSMENT — PAIN SCALES - GENERAL: PAINLEVEL_OUTOF10: 0-NO PAIN

## 2025-06-05 NOTE — PROGRESS NOTES
"Primary Care Physician: Bill Sue MD  Date of Visit: 06/05/2025 10:40 AM EDT  Location of visit: [facility] [place] [address]   Last office visit: December 12, 2024    Chief Complaint:     CAD, 6-month.    HPI/Summary  Ha Payne is a 70 y.o. male who presents for followup cardiology evaluation.     The patient is more than 20 years status post myocardial infarction.  In 2013, he underwent primary prevention ICD.  He has undergone several coronary interventions.  In 2016, we identified an apical mural thrombus with severe LV dysfunction, and prescribed a course of anticoagulation.  He also developed some \"clumsiness\" involving the left hand, which was self-limited, but he preferred to discontinue warfarin anticoagulation.  In October, 2019 he presented to the emergency room with ACS but declined hospitalization.  He has also declined treatment with an SGLT2 inhibitor.  He is limited by severe chronic back pain and his care has been somewhat compromised by medication noncompliance.  The last echocardiogram was on July 8, 2022.  The ejection fraction was 30 to 35% with global hypokinesis.  The left ventricle was dilated.  No thrombus.  No significant changes compared to February 6, 2021.  Device checks have not demonstrated in the atrial or ventricular arrhythmia.  Follows with endocrinology, using a CGM, injects insulin and checks blood sugars regularly.    He continues on aspirin, carvedilol, Entresto, eplerenone, ezetimibe, simvastatin, torsemide, glipizide and Toujeo.  He has declined recommended SGLT2 inhibitor therapy on multiple occasions.  Metformin was withdrawn secondary to GI symptoms.    Recent laboratory testing reviewed: BNP remains elevated at 913.  Creatinine 0.92.  Hemoglobin A1c 8.1%.  Last LDL cholesterol only 41 mg/dL.    No real change clinically.  He takes torsemide most days of the week.  He does have some chronic right lower extremity edema.  He has lost 5 pounds since the last visit and " he does think that the edema is less.  He is mainly limited by chronic pain.  He saw primary care, a CT scan showed some progression of mediastinal lymphadenopathy, he was scheduled for a PET scan but he could not lie flat for the imaging.  He has been advised to consider bronchoscopy, does not appear to be very interested in pursuing any further evaluation.  No fevers or chills, no night sweats, no weight loss.  Appetite is satisfactory.  A follow-up CT scan is scheduled.  Specialty Problems          Cardiology Problems    CAD (coronary artery disease)    Cardiac defibrillator in place    2005         Chronic congestive heart failure    September, 2012: EF 35-40.  April, 2016: EF 30.  October, 2019: EF 25.  February, 2021: EF 35.  July, 2022: EF 30-35.         Elevated LDL cholesterol level    H/O non-ST elevation myocardial infarction (NSTEMI)    October, 2019         Hypertension    Ventricular tachycardia (Multi)    Disorder involving thrombocytopenia    History of myocardial infarction     Myocardial infarction 1995. Reinfarction 1998. November 21, 2012: Status post PCI to a      severe stenosis in the inferior limb of the obtuse marginal circumflex. Mild diffuse left      main, chronic total occlusion of LAD, collaterals from distal RCA to LAD, 60% first      marginal, 90% second marginal, 30% mid RCA, 40% in-stent restenosis of RCA.         Left ventricular apical thrombus    Apical mural thrombus         Restrictive cardiomyopathy (Multi)        Past Medical History:   Diagnosis Date    Acute ischemic heart disease, unspecified 06/27/2022    Acute coronary syndrome    Allergic     Arthritis     Back pain     Cervical disc disorder     CHF (congestive heart failure)     Diabetes mellitus (Multi)     Extremity pain     Heart disease     Hypertension     Intracardiac thrombosis, not elsewhere classified 08/12/2022    Apical mural thrombus    Low back pain     Neck pain     Old myocardial infarction 08/12/2022  "   History of acute myocardial infarction    Opioid dependence, uncomplicated (Multi) 10/03/2013    Opioid dependence    Other conditions influencing health status 2013    Disorder Of The Pelvis / Hip Joint / Femur    Other conditions influencing health status 2013    Toxicity From Drugs, Medicaments, Or Biological Substances    Peripheral neuropathy     Spinal stenosis           Past Surgical History:   Procedure Laterality Date    CARDIAC CATHETERIZATION              Social History     Tobacco Use    Smoking status: Former     Current packs/day: 0.00     Types: Cigarettes     Quit date: 1995     Years since quittin.4    Smokeless tobacco: Never   Vaping Use    Vaping status: Never Used   Substance Use Topics    Alcohol use: Never    Drug use: Yes     Types: Hydrocodone                 Allergies   Allergen Reactions    Amoxicillin-Pot Clavulanate Swelling and Unknown    Prochlorperazine Unknown and Other     Pt states he has spasms.    Brilinta [Ticagrelor] Rash         Current Outpatient Medications   Medication Instructions    acetaminophen (Tylenol) 325 mg tablet 1-2 tablets, Every 4 hours PRN    aspirin 81 mg chewable tablet 1 tablet, Every 24 hours    BD Ultra-Fine Mini Pen Needle 31 gauge x 3/16\" needle 1 each    carvedilol (COREG) 25 mg, oral, 2 times daily (morning and late afternoon)    cholecalciferol (Vitamin D-3) 25 MCG (1000 UT) capsule 1 capsule, Daily    cyanocobalamin (VITAMIN B-12) 2,000 mcg, oral, Daily    Entresto  mg tablet 1 tablet, oral, 2 times daily    eplerenone (INSPRA) 50 mg, oral, Daily    ezetimibe-simvastatin (Vytorin) 10-40 mg tablet 1 tablet, oral, Nightly    famotidine (Pepcid) 20 mg tablet 1 tablet, Daily    glipiZIDE (GLUCOTROL) 15 mg, oral, Daily, 2 tablets in am and 1 tablet in qpm    HYDROcodone-acetaminophen (Norco) 7.5-325 mg tablet 1 tablet, oral, Every 6 hours PRN    [START ON 2025] HYDROcodone-acetaminophen (Norco) 7.5-325 mg tablet 1 " "tablet, oral, Every 6 hours PRN    HYDROcodone-acetaminophen (Norco) 7.5-325 mg tablet 1 tablet, oral, Every 6 hours PRN    insulin glargine (TOUJEO SOLOSTAR- 1 UNIT DIAL) 26 Units, subcutaneous, Every morning, Take as directed per insulin instructions.    loperamide (Imodium A-D) 2 mg capsule 1 capsule, 4 times daily    nitroglycerin (NITROSTAT) 0.4 mg, sublingual, Every 5 min PRN    pen needle, diabetic (BD Doreen 2nd Gen Pen Needle) 32 gauge x 5/32\" needle Use 1x a day    pen needle, diabetic (BD ULTRA-FINE MINI PEN NEEDLE MISC) 1 Stick, 2 times daily    pen needle, diabetic (TechLITE Pen Needle) 31 gauge x 5/16\" needle Use twice daily with insulin    pregabalin (LYRICA) 200 mg, oral, 3 times daily    torsemide (DEMADEX) 20 mg, oral, Daily       ROS     Vital Signs:  There were no vitals filed for this visit.    Wt Readings from Last 2 Encounters:   05/30/25 82.6 kg (182 lb)   05/22/25 82.8 kg (182 lb 9.6 oz)     There is no height or weight on file to calculate BMI.       Physical Exam:    On examination, he was in moderate but not severe pain.  Inspiratory rales.  2+ right lower extremity edema.  Heart sounds regular, no murmurs or gallops.     Last Labs:  CMP:  Recent Labs     06/03/25  0848 03/04/25  0840 09/11/24  1011 03/11/24  1036 09/19/23  1405    141 140 141 142   K 4.0 4.2 4.2 4.1 4.7    102 100 99 104   CO2 26 29 32 32 29   ANIONGAP 13 10 12 14 14   BUN 18 22 21 25* 21   CREATININE 0.88 0.92 1.23 0.98 0.99   EGFR 92 89 63 83  --    GLUCOSE 73 80 128* 132* 133*     Recent Labs     06/03/25  0848 03/04/25  0840 09/11/24  1011 03/11/24  1036 09/19/23  1405   ALBUMIN 4.2 4.3 4.3 4.3 4.2   ALKPHOS 103 108 80 100 66   ALT 14 9 15 9* 22   AST 17 14 18 12 25   BILITOT 1.1 1.1 1.0 0.9 0.6     CBC:  Recent Labs     06/03/25  0848 03/04/25  0840 09/13/24  1124 03/11/24  1036 11/06/23  1159   WBC 6.5 6.1 7.6 7.3 5.8   HGB 17.6* 17.4* 17.6* 14.7 15.0   HCT 57.1* 55.8* 54.7* 46.3 47.9   PLT 64* 67* 66* " 91* 103*   .2* 100.0 96 92 99     COAG:   Recent Labs     09/13/24  1124 07/07/22  1603 09/09/21  0549 02/08/21  0815 02/05/21  2152   INR 1.1 1.2* 1.0 1.1 1.3*     HEME/ENDO:  Recent Labs     06/03/25  0848 03/04/25  0840 09/13/24  1124 09/11/24  0947 03/11/24  1036 11/06/23  1159 09/19/23  1405 11/08/22  0958 07/09/22  1908 07/08/22  0700   FERRITIN  --   --   --   --   --  108  --   --   --   --    IRONSAT  --   --   --   --   --  11*  --   --   --   --    TSH 2.64  --  1.39  --   --   --   --   --  1.86 0.65   HGBA1C  --  8.1*  --  6.4 8.2*  --  7.8*   < > 6.8*  --     < > = values in this interval not displayed.      CARDIAC:   Recent Labs     09/11/24  1011 03/24/23  0832 07/07/22  1945 07/07/22  1722 07/07/22  1603 09/09/21  0549 04/23/21  1015   TROPHS  --   --  11 13 15  --   --    * 1,192*  --   --  1,263* 113* 295*     Recent Labs     03/04/25  0840 03/11/24  1036 03/24/23  0832 05/11/22  0904 02/06/21  1849 10/13/20  0840   CHOL 91 89 90 112  --  129   LDLF  --   --  46 58  --  57   HDL 32* 29.0 26.8* 29.2*  --  39.2*   TRIG 96 99 86 124   < > 163*    < > = values in this interval not displayed.       Last Cardiology Tests:    ECG:    Sinus rhythm with first-degree AV block, left axis deviation, inferior and anterolateral infarction age undetermined.    Echo:  Echo Results:  No results found for this or any previous visit from the past 3650 days.       Cath:      Stress Test:  Stress Results:  No results found for this or any previous visit from the past 365 days.         Cardiac Imaging:        Assessment/Plan     We reviewed the patient's complex problem list and we reviewed the 2022 echocardiogram.  We discussed the mediastinal adenopathy which has been somewhat progressive since 2022.  He again declines consideration of an SGLT2 inhibitor.    His quality of life is markedly impaired by chronic back pain.  He has been compliant with most but not all of his cardiac care.   The mediastinal  adenopathy may be reactive, but an indolent lymphoma or other processes cannot be excluded.  We will be in touch with his primary care provider.  I suspect that he will decline any further evaluation at this time.    Today, we discussed elective generator change out when his battery reaches EOL.  Currently, he is inclined not to proceed with the procedure, understanding that he remains at high risk for arrhythmia.  He has not experienced VT, has not had a device discharge since implantation in 2005, and his overall quality of life is poor.  He does have chronic pain, requiring treatment with narcotics.  He is mainly at home, gets out infrequently, ambulates with a cane.  We discussed that this is entirely his decision and that we would support him.  We reconciled all of his medications, no changes.  Again discussed the follow-up CT scan.  We also note thrombocytopenia, polycythemia, and hypoxia.  He may consider following through on a recommended hematology consultation pulmonary function testing.    We will continue 6-month monitoring.  Orders:  No orders of the defined types were placed in this encounter.     Followup Appts:  Future Appointments   Date Time Provider Department [place]   6/5/2025 10:40 AM Burt Larsen MD [id number] [facility]   6/6/2025 10:30 AM Critical access hospital016 CT 1 [id number] [facility]   7/1/2025 10:30 AM [facility] PULMONARY FUNCTION RM [id number] [place]   7/8/2025 11:00 AM Amelie Richardson MD [id number] [facility]   7/28/2025 11:30 AM Best Gonzalez PA-C [id number] None   9/3/2025 10:00 AM Bill Sue MD [id number] [facility]   9/22/2025 10:30 AM Darren Lunsford MD [id number] None   11/4/2025 11:00 AM Amelie Richardson MD [id number] [facility]   6/3/2026 10:30 AM Bill Sue MD [id number] [facility]           ____________________________________________________________  Burt Larsen MD    Senior Attending Physician  [facility]  [facility]    Sonny Fitzgerald Chair for Cardiovascular Excellence  [facility] [employer]

## 2025-06-06 ENCOUNTER — HOSPITAL ENCOUNTER (OUTPATIENT)
Dept: RADIOLOGY | Facility: CLINIC | Age: 70
Discharge: HOME | End: 2025-06-06
Payer: MEDICARE

## 2025-06-06 DIAGNOSIS — C38.3 MALIGNANT NEOPLASM OF MEDIASTINUM, PART UNSPECIFIED (MULTI): ICD-10-CM

## 2025-06-06 PROCEDURE — 71250 CT THORAX DX C-: CPT

## 2025-06-30 DIAGNOSIS — M47.27 LUMBOSACRAL SPONDYLOSIS WITH RADICULOPATHY: ICD-10-CM

## 2025-06-30 DIAGNOSIS — M48.062 SPINAL STENOSIS OF LUMBAR REGION WITH NEUROGENIC CLAUDICATION: ICD-10-CM

## 2025-06-30 RX ORDER — PREGABALIN 200 MG/1
200 CAPSULE ORAL 3 TIMES DAILY
Qty: 90 CAPSULE | Refills: 0 | Status: SHIPPED | OUTPATIENT
Start: 2025-07-06 | End: 2025-08-05

## 2025-07-01 ENCOUNTER — APPOINTMENT (OUTPATIENT)
Dept: RESPIRATORY THERAPY | Facility: HOSPITAL | Age: 70
End: 2025-07-01
Payer: MEDICARE

## 2025-07-07 ENCOUNTER — HOSPITAL ENCOUNTER (OUTPATIENT)
Dept: CARDIOLOGY | Facility: CLINIC | Age: 70
Discharge: HOME | End: 2025-07-07
Payer: MEDICARE

## 2025-07-07 DIAGNOSIS — I42.5 OTHER RESTRICTIVE CARDIOMYOPATHY: ICD-10-CM

## 2025-07-07 DIAGNOSIS — Z95.810 PRESENCE OF AUTOMATIC (IMPLANTABLE) CARDIAC DEFIBRILLATOR: ICD-10-CM

## 2025-07-07 PROCEDURE — 93296 REM INTERROG EVL PM/IDS: CPT

## 2025-07-08 ENCOUNTER — APPOINTMENT (OUTPATIENT)
Dept: ENDOCRINOLOGY | Facility: CLINIC | Age: 70
End: 2025-07-08
Payer: MEDICARE

## 2025-07-08 VITALS
WEIGHT: 185.8 LBS | RESPIRATION RATE: 16 BRPM | HEART RATE: 76 BPM | SYSTOLIC BLOOD PRESSURE: 120 MMHG | DIASTOLIC BLOOD PRESSURE: 70 MMHG | BODY MASS INDEX: 25.17 KG/M2 | HEIGHT: 72 IN

## 2025-07-08 DIAGNOSIS — E11.9 TYPE 2 DIABETES MELLITUS WITHOUT COMPLICATION, WITH LONG-TERM CURRENT USE OF INSULIN: ICD-10-CM

## 2025-07-08 DIAGNOSIS — Z79.4 TYPE 2 DIABETES MELLITUS WITHOUT COMPLICATION, WITH LONG-TERM CURRENT USE OF INSULIN: ICD-10-CM

## 2025-07-08 DIAGNOSIS — E11.9 TYPE 2 DIABETES MELLITUS WITHOUT COMPLICATION, WITH LONG-TERM CURRENT USE OF INSULIN: Primary | ICD-10-CM

## 2025-07-08 DIAGNOSIS — E78.00 ELEVATED LDL CHOLESTEROL LEVEL: ICD-10-CM

## 2025-07-08 DIAGNOSIS — Z79.4 TYPE 2 DIABETES MELLITUS WITHOUT COMPLICATION, WITH LONG-TERM CURRENT USE OF INSULIN: Primary | ICD-10-CM

## 2025-07-08 DIAGNOSIS — I10 HYPERTENSION, UNSPECIFIED TYPE: ICD-10-CM

## 2025-07-08 LAB — BODY SURFACE AREA: 2.07 M2

## 2025-07-08 PROCEDURE — 3078F DIAST BP <80 MM HG: CPT | Performed by: INTERNAL MEDICINE

## 2025-07-08 PROCEDURE — G2211 COMPLEX E/M VISIT ADD ON: HCPCS | Performed by: INTERNAL MEDICINE

## 2025-07-08 PROCEDURE — 1160F RVW MEDS BY RX/DR IN RCRD: CPT | Performed by: INTERNAL MEDICINE

## 2025-07-08 PROCEDURE — 99214 OFFICE O/P EST MOD 30 MIN: CPT | Performed by: INTERNAL MEDICINE

## 2025-07-08 PROCEDURE — 3008F BODY MASS INDEX DOCD: CPT | Performed by: INTERNAL MEDICINE

## 2025-07-08 PROCEDURE — 1036F TOBACCO NON-USER: CPT | Performed by: INTERNAL MEDICINE

## 2025-07-08 PROCEDURE — 1159F MED LIST DOCD IN RCRD: CPT | Performed by: INTERNAL MEDICINE

## 2025-07-08 PROCEDURE — 3074F SYST BP LT 130 MM HG: CPT | Performed by: INTERNAL MEDICINE

## 2025-07-08 PROCEDURE — 1126F AMNT PAIN NOTED NONE PRSNT: CPT | Performed by: INTERNAL MEDICINE

## 2025-07-08 RX ORDER — INSULIN GLARGINE 300 U/ML
26 INJECTION, SOLUTION SUBCUTANEOUS EVERY MORNING
Qty: 7.8 ML | Refills: 3 | Status: SHIPPED | OUTPATIENT
Start: 2025-07-08 | End: 2026-07-08

## 2025-07-08 ASSESSMENT — ENCOUNTER SYMPTOMS
DEPRESSION: 0
CHILLS: 0
VOMITING: 0
DIZZINESS: 0
SHORTNESS OF BREATH: 0
NAUSEA: 0
LOSS OF SENSATION IN FEET: 1
OCCASIONAL FEELINGS OF UNSTEADINESS: 1
LIGHT-HEADEDNESS: 0
DIARRHEA: 0
FEVER: 0

## 2025-07-08 ASSESSMENT — PAIN SCALES - GENERAL: PAINLEVEL_OUTOF10: 0-NO PAIN

## 2025-07-08 NOTE — PROGRESS NOTES
"Endocrinology: Follow up visit  Subjective   Patient ID: Ha Payne is a 70 y.o. male who presents for Diabetes, Hyperlipidemia, and Hypertension.    PCP: Bill Sue MD    HPI  Jose approx at 20.     Glipizide 5 mg/5 mg     Metformin: gi intolerant  Sglt2: refuses  Last time having a lot of lows, adjusted glipizide and now taking it as needed for eating.  Sugars are excellent in log  No recent lows  Seeing heme/onc soon for ct lung findings    Checks sugars 4x a day  Injects insulin 1x a day  Currently utilizing cgm to check sugars      Review of Systems   Constitutional:  Negative for chills and fever.   Respiratory:  Negative for shortness of breath.    Gastrointestinal:  Negative for diarrhea, nausea and vomiting.   Endocrine: Negative for cold intolerance and heat intolerance.   Neurological:  Negative for dizziness and light-headedness.       Problem List[1]     Home Meds:  Current Outpatient Medications   Medication Instructions    acetaminophen (Tylenol) 325 mg tablet 1-2 tablets, Every 4 hours PRN    aspirin 81 mg chewable tablet 1 tablet, Every 24 hours    BD Ultra-Fine Mini Pen Needle 31 gauge x 3/16\" needle 1 each    carvedilol (COREG) 25 mg, oral, 2 times daily (morning and late afternoon)    cholecalciferol (Vitamin D-3) 25 MCG (1000 UT) capsule 1 capsule, Daily    cyanocobalamin (VITAMIN B-12) 2,000 mcg, oral, Daily    Entresto  mg tablet 1 tablet, oral, 2 times daily    eplerenone (INSPRA) 50 mg, oral, Daily    ezetimibe-simvastatin (Vytorin) 10-40 mg tablet 1 tablet, oral, Nightly    famotidine (Pepcid) 20 mg tablet 1 tablet, As needed    folic acid (FOLVITE) 1 mg, oral, Daily    glipiZIDE (GLUCOTROL) 15 mg, oral, Daily, 2 tablets in am and 1 tablet in qpm    HYDROcodone-acetaminophen (Norco) 7.5-325 mg tablet 1 tablet, oral, Every 6 hours PRN    HYDROcodone-acetaminophen (Norco) 7.5-325 mg tablet 1 tablet, oral, Every 6 hours PRN    insulin glargine (TOUJEO SOLOSTAR- 1 UNIT " "DIAL) 26 Units, subcutaneous, Every morning, Take as directed per insulin instructions.    loperamide (Imodium A-D) 2 mg capsule 1 capsule, 4 times daily PRN    nitroglycerin (NITROSTAT) 0.4 mg, sublingual, Every 5 min PRN    pen needle, diabetic (BD Doreen 2nd Gen Pen Needle) 32 gauge x 5/32\" needle Use 1x a day    pen needle, diabetic (BD ULTRA-FINE MINI PEN NEEDLE MISC) 1 Stick, 2 times daily    pen needle, diabetic (TechLITE Pen Needle) 31 gauge x 5/16\" needle Use twice daily with insulin    pregabalin (LYRICA) 200 mg, oral, 3 times daily    torsemide (DEMADEX) 20 mg, oral, Daily        RX Allergies[2]     Objective   Vitals:    07/08/25 1119   BP: 120/70   Pulse: 76   Resp: 16      Vitals:    07/08/25 1119   Weight: 84.3 kg (185 lb 12.8 oz)      Body mass index is 25.2 kg/m².   Physical Exam  Constitutional:       Appearance: Normal appearance. He is normal weight.   HENT:      Head: Normocephalic and atraumatic.   Neck:      Thyroid: No thyroid mass, thyromegaly or thyroid tenderness.   Cardiovascular:      Rate and Rhythm: Normal rate and regular rhythm.      Heart sounds: No murmur heard.     No gallop.   Pulmonary:      Effort: Pulmonary effort is normal.      Breath sounds: Normal breath sounds.   Abdominal:      Palpations: Abdomen is soft.      Comments: benign   Neurological:      General: No focal deficit present.      Mental Status: He is alert and oriented to person, place, and time.      Deep Tendon Reflexes: Reflexes are normal and symmetric.   Psychiatric:         Behavior: Behavior is cooperative.         Labs:  Lab Results   Component Value Date    HGBA1C 8.1 (H) 03/04/2025    TSH 2.64 06/03/2025      Lab Results   Component Value Date    PR1  09/13/2024     Thrombocytopenia and lymphopenia. Increased red blood cell mass, consistent with increased hemoglobin and hematocrit on CBC. Clinical correlation recommended, including with results of myeloid malignancies panel.        Assessment/Plan "   Assessment & Plan  Type 2 diabetes mellitus without complication, with long-term current use of insulin  Sugars are great  No changes to meds  A1c with next labs  Orders:    Hemoglobin A1C; Future    Hypertension, unspecified type    Bp excellent on current meds  Elevated LDL cholesterol level    Stable on statin+zetia      Electronically signed by:  Amelie Richardson MD 07/08/25 11:34 AM                   [1]   Patient Active Problem List  Diagnosis    Bilateral tinnitus    CAD (coronary artery disease)    Presence of automatic (implantable) cardiac defibrillator    Cellulitis    Cervicalgia    Chronic congestive heart failure    Diabetes mellitus (Multi)    Diabetic amyotrophy associated with type 2 diabetes mellitus    Drug rash    Dyshidrotic eczema    Dysuria    ED (erectile dysfunction)    Edema    Elevated LDL cholesterol level    Fever    Generalized neuropathy    H/O non-ST elevation myocardial infarction (NSTEMI)    Hematuria    Hypertension    Insomnia    Lower leg edema    Lumbosacral spondylosis with radiculopathy    Meningioma (Multi)    Muscle pain    Nephrolithiasis    Cervical spinal stenosis    Osteoarthritis of spine with radiculopathy, cervical region    Other chronic pain    Syncope    Ventricular tachycardia (Multi)    Weakness of left hand    Spinal stenosis of lumbar region with neurogenic claudication    Left ventricular apical thrombus    History of myocardial infarction    Restrictive cardiomyopathy (Multi)    Disorder involving thrombocytopenia    Lymphadenopathy   [2]   Allergies  Allergen Reactions    Amoxicillin-Pot Clavulanate Swelling and Unknown    Prochlorperazine Unknown and Other     Pt states he has spasms.    Brilinta [Ticagrelor] Rash

## 2025-07-08 NOTE — PATIENT INSTRUCTIONS
Continue current diabetes management  Call or message with concerns about sugars  Follow up in 6 months

## 2025-07-09 ENCOUNTER — LAB (OUTPATIENT)
Dept: LAB | Facility: HOSPITAL | Age: 70
End: 2025-07-09
Payer: MEDICARE

## 2025-07-09 DIAGNOSIS — D75.1 SECONDARY POLYCYTHEMIA: Primary | ICD-10-CM

## 2025-07-09 LAB
ALBUMIN SERPL BCP-MCNC: 4.2 G/DL (ref 3.4–5)
ALP SERPL-CCNC: 103 U/L (ref 33–136)
ALT SERPL W P-5'-P-CCNC: 14 U/L (ref 10–52)
ANION GAP SERPL CALC-SCNC: 17 MMOL/L (ref 10–20)
AST SERPL W P-5'-P-CCNC: 17 U/L (ref 9–39)
BASOPHILS # BLD MANUAL: 0.11 X10*3/UL (ref 0–0.1)
BASOPHILS NFR BLD MANUAL: 1.7 %
BILIRUB SERPL-MCNC: 1.2 MG/DL (ref 0–1.2)
BLASTS # BLD MANUAL: 0 X10*3/UL
BLASTS NFR BLD MANUAL: 0 %
BUN SERPL-MCNC: 30 MG/DL (ref 6–23)
BURR CELLS BLD QL SMEAR: ABNORMAL
CALCIUM SERPL-MCNC: 9.2 MG/DL (ref 8.6–10.6)
CHLORIDE SERPL-SCNC: 98 MMOL/L (ref 98–107)
CO2 SERPL-SCNC: 29 MMOL/L (ref 21–32)
CREAT SERPL-MCNC: 1.43 MG/DL (ref 0.5–1.3)
EGFRCR SERPLBLD CKD-EPI 2021: 52 ML/MIN/1.73M*2
EOSINOPHIL # BLD MANUAL: 0.4 X10*3/UL (ref 0–0.4)
EOSINOPHIL NFR BLD MANUAL: 6 %
ERYTHROCYTE [DISTWIDTH] IN BLOOD BY AUTOMATED COUNT: 20.1 % (ref 11.5–14.5)
FERRITIN SERPL-MCNC: 67 NG/ML (ref 20–300)
GLUCOSE SERPL-MCNC: 134 MG/DL (ref 74–99)
HAV IGM SER QL: NONREACTIVE
HBV CORE IGM SER QL: NONREACTIVE
HBV SURFACE AG SERPL QL IA: NONREACTIVE
HCT VFR BLD AUTO: 59.4 % (ref 41–52)
HCV AB SER QL: NONREACTIVE
HGB BLD-MCNC: 18.4 G/DL (ref 13.5–17.5)
HGB RETIC QN: 30 PG (ref 28–38)
HIV 1+2 AB+HIV1 P24 AG SERPL QL IA: NONREACTIVE
IMM GRANULOCYTES # BLD AUTO: 0.01 X10*3/UL (ref 0–0.5)
IMM GRANULOCYTES NFR BLD AUTO: 0.2 % (ref 0–0.9)
IMMATURE RETIC FRACTION: 16 %
IRON SATN MFR SERPL: 22 % (ref 25–45)
IRON SERPL-MCNC: 82 UG/DL (ref 35–150)
LDH SERPL L TO P-CCNC: 183 U/L (ref 84–246)
LYMPHOCYTES # BLD MANUAL: 0.68 X10*3/UL (ref 0.8–3)
LYMPHOCYTES NFR BLD MANUAL: 10.3 %
MCH RBC QN AUTO: 31.4 PG (ref 26–34)
MCHC RBC AUTO-ENTMCNC: 31 G/DL (ref 32–36)
MCV RBC AUTO: 101 FL (ref 80–100)
METAMYELOCYTES # BLD MANUAL: 0 X10*3/UL
METAMYELOCYTES NFR BLD MANUAL: 0 %
MONOCYTES # BLD MANUAL: 0.34 X10*3/UL (ref 0.05–0.8)
MONOCYTES NFR BLD MANUAL: 5.1 %
MYELOCYTES # BLD MANUAL: 0 X10*3/UL
MYELOCYTES NFR BLD MANUAL: 0 %
NEUTROPHILS # BLD MANUAL: 4.91 X10*3/UL (ref 1.6–5.5)
NEUTS BAND # BLD MANUAL: 0 X10*3/UL (ref 0–0.5)
NEUTS BAND NFR BLD MANUAL: 0 %
NEUTS SEG # BLD MANUAL: 4.91 X10*3/UL (ref 1.6–5)
NEUTS SEG NFR BLD MANUAL: 74.4 %
NRBC BLD MANUAL-RTO: 0 % (ref 0–0)
NRBC BLD-RTO: 0 /100 WBCS (ref 0–0)
PLASMA CELLS # BLD MANUAL: 0 X10*3/UL
PLASMA CELLS NFR BLD MANUAL: 0 %
PLATELET # BLD AUTO: ABNORMAL 10*3/UL
POTASSIUM SERPL-SCNC: 4.2 MMOL/L (ref 3.5–5.3)
PROMYELOCYTES # BLD MANUAL: 0 X10*3/UL
PROMYELOCYTES NFR BLD MANUAL: 0 %
PROT SERPL-MCNC: 7.1 G/DL (ref 6.4–8.2)
RBC # BLD AUTO: 5.86 X10*6/UL (ref 4.5–5.9)
RBC MORPH BLD: ABNORMAL
RETICS #: 0.1 X10*6/UL (ref 0.02–0.11)
RETICS/RBC NFR AUTO: 1.7 % (ref 0.5–2)
SODIUM SERPL-SCNC: 140 MMOL/L (ref 136–145)
TIBC SERPL-MCNC: 366 UG/DL (ref 240–445)
TOTAL CELLS COUNTED BLD: 117
UIBC SERPL-MCNC: 284 UG/DL (ref 110–370)
VARIANT LYMPHS # BLD MANUAL: 0.05 X10*3/UL (ref 0–0.3)
VARIANT LYMPHS NFR BLD: 0.8 %
VIT B12 SERPL-MCNC: 1986 PG/ML (ref 211–911)
WBC # BLD AUTO: 6.6 X10*3/UL (ref 4.4–11.3)
WBC OTHER # BLD MANUAL: 0.11 X10*3/UL
WBC OTHER NFR BLD MANUAL: 1.7 %

## 2025-07-09 PROCEDURE — 81450 HL NEO GSAP 5-50DNA/DNA&RNA: CPT

## 2025-07-09 PROCEDURE — 82728 ASSAY OF FERRITIN: CPT

## 2025-07-09 PROCEDURE — 83540 ASSAY OF IRON: CPT

## 2025-07-09 PROCEDURE — 83615 LACTATE (LD) (LDH) ENZYME: CPT

## 2025-07-09 PROCEDURE — 85045 AUTOMATED RETICULOCYTE COUNT: CPT

## 2025-07-09 PROCEDURE — 80053 COMPREHEN METABOLIC PANEL: CPT

## 2025-07-09 PROCEDURE — 87389 HIV-1 AG W/HIV-1&-2 AB AG IA: CPT

## 2025-07-09 PROCEDURE — 80074 ACUTE HEPATITIS PANEL: CPT

## 2025-07-09 PROCEDURE — G0452 MOLECULAR PATHOLOGY INTERPR: HCPCS

## 2025-07-09 PROCEDURE — 85007 BL SMEAR W/DIFF WBC COUNT: CPT

## 2025-07-09 PROCEDURE — 85027 COMPLETE CBC AUTOMATED: CPT

## 2025-07-09 PROCEDURE — 82668 ASSAY OF ERYTHROPOIETIN: CPT

## 2025-07-09 PROCEDURE — 83550 IRON BINDING TEST: CPT

## 2025-07-09 PROCEDURE — 82607 VITAMIN B-12: CPT

## 2025-07-10 LAB
EST. AVERAGE GLUCOSE BLD GHB EST-MCNC: 160 MG/DL
EST. AVERAGE GLUCOSE BLD GHB EST-SCNC: 8.9 MMOL/L
HBA1C MFR BLD: 7.2 %

## 2025-07-11 ENCOUNTER — OFFICE VISIT (OUTPATIENT)
Dept: HEMATOLOGY/ONCOLOGY | Facility: CLINIC | Age: 70
End: 2025-07-11
Payer: MEDICARE

## 2025-07-11 VITALS
RESPIRATION RATE: 18 BRPM | WEIGHT: 182.87 LBS | SYSTOLIC BLOOD PRESSURE: 102 MMHG | TEMPERATURE: 96.4 F | HEART RATE: 71 BPM | DIASTOLIC BLOOD PRESSURE: 61 MMHG | BODY MASS INDEX: 24.8 KG/M2

## 2025-07-11 DIAGNOSIS — R53.83 OTHER FATIGUE: ICD-10-CM

## 2025-07-11 DIAGNOSIS — D69.6 THROMBOCYTOPENIA: ICD-10-CM

## 2025-07-11 DIAGNOSIS — D75.1 ERYTHROCYTOSIS: Primary | ICD-10-CM

## 2025-07-11 DIAGNOSIS — D75.1 POLYCYTHEMIA: ICD-10-CM

## 2025-07-11 LAB
EPO SERPL-ACNC: 144 MU/ML (ref 4–27)
HOLD SPECIMEN: NORMAL

## 2025-07-11 PROCEDURE — 1159F MED LIST DOCD IN RCRD: CPT | Performed by: STUDENT IN AN ORGANIZED HEALTH CARE EDUCATION/TRAINING PROGRAM

## 2025-07-11 PROCEDURE — 99215 OFFICE O/P EST HI 40 MIN: CPT | Performed by: STUDENT IN AN ORGANIZED HEALTH CARE EDUCATION/TRAINING PROGRAM

## 2025-07-11 PROCEDURE — 3074F SYST BP LT 130 MM HG: CPT | Performed by: STUDENT IN AN ORGANIZED HEALTH CARE EDUCATION/TRAINING PROGRAM

## 2025-07-11 PROCEDURE — 3078F DIAST BP <80 MM HG: CPT | Performed by: STUDENT IN AN ORGANIZED HEALTH CARE EDUCATION/TRAINING PROGRAM

## 2025-07-11 PROCEDURE — 99205 OFFICE O/P NEW HI 60 MIN: CPT | Performed by: STUDENT IN AN ORGANIZED HEALTH CARE EDUCATION/TRAINING PROGRAM

## 2025-07-11 PROCEDURE — 1125F AMNT PAIN NOTED PAIN PRSNT: CPT | Performed by: STUDENT IN AN ORGANIZED HEALTH CARE EDUCATION/TRAINING PROGRAM

## 2025-07-11 ASSESSMENT — PAIN SCALES - GENERAL: PAINLEVEL_OUTOF10: 9

## 2025-07-11 NOTE — PROGRESS NOTES
Patient here for new patient visit with Dr. Robles for polycthemia & thrombocytopenia.   Patient here with his wife.    Medications and Allergies reviewed and reconciled this visit.    No concerns or complaints noted at this time.     Pt reports appetite is fair.     Labs reviewed.  MD recommended BMB. Patient declines at this time.  MD discussed possible diagnoses.   MD will follow up with a virtual visit to discuss pending labs.   Follow up per MD request.    Pt. reports availability and use of mychart, Reviewed this is a good place to communicate with the team as well as review labs and upcoming orders.     No barriers to education noted, patient agrees to current plan and verbalized understanding using teach back method.    Education Documentation  Complete Blood Count with Differential (CBC w/ Diff), taught by Jyoti Vazquez RN at 7/11/2025 11:13 AM.  Learner: Significant Other, Patient  Readiness: Acceptance  Method: Explanation  Response: Verbalizes Understanding    Oriented to Facility, taught by Jyoti Vazquez RN at 7/11/2025 11:13 AM.  Learner: Significant Other, Patient  Readiness: Acceptance  Method: Explanation  Response: Verbalizes Understanding    Education Comments  No comments found.

## 2025-07-13 NOTE — PROGRESS NOTES
Patient ID: Ha Payne is a 70 y.o. male.  Referring Physician: Bill Sue MD  [address]  [facility], [facility] [address]  [place] [address]  Primary Care Provider: Bill Sue MD  Referral Reason: erythrocytosis and thrombocytopenia    HPI:  Mr Payne is a 69 yo male with history of CHF with ICD placement, diabetes and chronic degenerative disc disorder, who was referred for erythrocytosis and thrombocytopenia. Patient complaints of chronic back pain due to DDD. He was former smoker but quitted more than 10 years ago, He denies history of asthma or COPD or sleep apnea. He denies symptoms like headache, pruritus, fatigue, abdominal discomfort. Reviewing labs, hemoglobin ranged 14-16.5 since , but it consistently elevated above 17 since , hematocrit >50, also has chronic thrombocytopenia 50-80, normal WBC.       Past Medical History: Medical History[1]  Social History:   Social History     Socioeconomic History    Marital status:      Spouse name: Not on file    Number of children: Not on file    Years of education: Not on file    Highest education level: Not on file   Occupational History    Not on file   Tobacco Use    Smoking status: Former     Current packs/day: 0.00     Types: Cigarettes     Quit date: 1995     Years since quittin.5    Smokeless tobacco: Never   Vaping Use    Vaping status: Never Used   Substance and Sexual Activity    Alcohol use: Never    Drug use: Yes     Types: Hydrocodone    Sexual activity: Not on file   Other Topics Concern    Not on file   Social History Narrative    Not on file     Social Drivers of Health     Financial Resource Strain: Not on file   Food Insecurity: Not on file   Transportation Needs: Not on file   Physical Activity: Not on file   Stress: Not on file   Social Connections: Not on file   Intimate Partner Violence: Not on file   Housing Stability: Not on file     Surgical History: Surgical  History[2]  Family History: Family History[3]   reports that he quit smoking about 30 years ago. His smoking use included cigarettes. He has never used smokeless tobacco.  Oncology Family history: Cancer-related family history is not on file.    Review Of Systems:  General: no fatigue, weight change, appetite change  Ears/Nose/Mouth/Throat: no mouth sore, no hearing loss, no nasal congestion, no sore throat  Cardiovascular: no chest pain, no shortness of breath, no palpitation  Pulmonary: no cough, no wheezing, no hemoptysis  GI: no nausea, no vomiting, no diarrhea or constipation, no bleeding per rectum  Neurological: no dizziness, no seizure, no weakness, no sensation change  Musculoskeletal: no joint swelling, no bone pain, no back pain  Skin: no skin rash, no bruising, no skin lesion      Physical Exam:  /61 (BP Location: Left arm, Patient Position: Sitting, BP Cuff Size: Adult long)   Pulse 71   Temp 35.8 °C (96.4 °F) (Temporal)   Resp 18   Wt 83 kg (182 lb 14 oz)   BMI 24.80 kg/m²   BSA: 2.05 meters squared  General: awake/alert/oriented x3, no distress  Head: atraumatic. Symmetric facial expressions  Eyes: PERRL, EOMI, clear sclera.  Ears/Nose/Mouth/Throat:  Oral mucous membranes moist. No oral ulcers  Neck: No palpable cervical chain lymph nodes  Respiratory: unlabored breathing on room air, good chest expansion, clear breath sounds on both sides, no ronchi  Cardio: Regular rate and rhythm, normal S1 and S2, radial pulses symmetric  GI: Nondistended, soft, non-tender abdomen  Musculoskeletal: Normal muscle bulk and tone, ROM intact, no joint swelling.  Extremities: No pedal edema, no arm or leg wounds  Neuro: Alert, cognition intact, speech normal. No motor deficits noted. Sensation intact to touch and hot/cold.   Psychological: Appropriate mood and behavior.  Skin: Warm and dry, no lesions, no rashes    Results:  Diagnostic Results   Lab Results   Component Value Date    WBC 6.6 07/09/2025    HGB  18.4 (H) 07/09/2025    HCT 59.4 (H) 07/09/2025     (H) 07/09/2025    PLT  07/09/2025      Comment:      69; Platelet count verified by smear review     Lab Results   Component Value Date    CALCIUM 9.2 07/09/2025     07/09/2025    K 4.2 07/09/2025    CO2 29 07/09/2025    CL 98 07/09/2025    BUN 30 (H) 07/09/2025    CREATININE 1.43 (H) 07/09/2025    ALT 14 07/09/2025    AST 17 07/09/2025     Current Medications[4]     Radiology:  [unfilled]     Pathology:    Assessment/Plan:    Erythrocytosis  - Reviewing labs, hemoglobin ranged 14-16.5 since 2020, but it consistently elevated above 17 since 2024, hematocrit >50, also has chronic thrombocytopenia 50-80, normal WBC.   - no history of thrombosis except CAD, currently on aspirin  - asymptomatic  - will check CBC, CMP, LDH, erythropoietin, NGS myeloid neoplasm panel      Chronic thrombocytopenia  - check CBC, CMP, hepatitis panel, HIV, retic count, vitamin B12  - discussed patient that he will need bone marrow biopsy if above tests are inconclusive. He states that he is 70 years old, even he knows diagnosis he would not go through treatment, so He is not interested in BMBx. But we will follow up and discuss further in next visit.    Performance Status: Symptomatic; fully ambulatory    I spent more than 60 minutes for the patient today, including face-to-face conversation, pre-visit preparation, post-visit orders, and others.   Kathleen Robles MD                                [1]   Past Medical History:  Diagnosis Date    Acute ischemic heart disease, unspecified 06/27/2022    Acute coronary syndrome    Allergic     Arthritis     Back pain     Cervical disc disorder     CHF (congestive heart failure)     Diabetes mellitus (Multi)     Extremity pain     Heart disease     Hypertension     Intracardiac thrombosis, not elsewhere classified 08/12/2022    Apical mural thrombus    Low back pain     Neck pain     Old myocardial infarction 08/12/2022    History of  "acute myocardial infarction    Opioid dependence, uncomplicated (Multi) 10/03/2013    Opioid dependence    Other conditions influencing health status 04/17/2013    Disorder Of The Pelvis / Hip Joint / Femur    Other conditions influencing health status 04/17/2013    Toxicity From Drugs, Medicaments, Or Biological Substances    Peripheral neuropathy     Spinal stenosis    [2]   Past Surgical History:  Procedure Laterality Date    CARDIAC CATHETERIZATION     [3]   Family History  Problem Relation Name Age of Onset    Heart disease Mother Jess     Heart disease Father Ramírez     Diabetes Brother Frank     Coronary artery disease Paternal Grandfather Jess     Heart disease Brother Frank Elmore    [4]   Current Outpatient Medications:     acetaminophen (Tylenol) 325 mg tablet, Take 1-2 tablets (325-650 mg) by mouth every 4 hours if needed., Disp: , Rfl:     aspirin 81 mg chewable tablet, Chew and swallow 1 tablet (81 mg) once every 24 hours., Disp: , Rfl:     BD Ultra-Fine Mini Pen Needle 31 gauge x 3/16\" needle, 1 each., Disp: , Rfl:     carvedilol (Coreg) 25 mg tablet, TAKE ONE TABLET BY MOUTH TWO TIMES A DAY with meals, Disp: 180 tablet, Rfl: 3    cholecalciferol (Vitamin D-3) 25 MCG (1000 UT) capsule, Take 1 capsule (25 mcg) by mouth once daily., Disp: , Rfl:     Entresto  mg tablet, TAKE 1 TABLET BY MOUTH TWICE A DAY, Disp: 180 tablet, Rfl: 3    eplerenone (Inspra) 50 mg tablet, TAKE ONE TABLET BY MOUTH EVERY DAY, Disp: 90 tablet, Rfl: 3    ezetimibe-simvastatin (Vytorin) 10-40 mg tablet, TAKE ONE TABLET BY MOUTH ONCE DAILY AT BEDTIME, Disp: 90 tablet, Rfl: 3    famotidine (Pepcid) 20 mg tablet, Take 1 tablet (20 mg) by mouth if needed., Disp: , Rfl:     folic acid (Folvite) 1 mg tablet, Take 1 tablet (1 mg) by mouth once daily., Disp: 90 tablet, Rfl: 1    glipiZIDE (Glucotrol) 5 mg tablet, Take 3 tablets (15 mg) by mouth once daily. 2 tablets in am and 1 tablet in qpm, Disp: 270 tablet, Rfl: 3    " "HYDROcodone-acetaminophen (Norco) 7.5-325 mg tablet, Take 1 tablet by mouth every 6 hours if needed for severe pain (7 - 10). Do not fill before June 29, 2025., Disp: 120 tablet, Rfl: 0    loperamide (Imodium A-D) 2 mg capsule, Take 1 capsule (2 mg) by mouth 4 times a day as needed., Disp: , Rfl:     nitroglycerin (Nitrostat) 0.4 mg SL tablet, Place 1 tablet (0.4 mg) under the tongue every 5 minutes if needed for chest pain., Disp: 25 tablet, Rfl: 3    pen needle, diabetic (BD Doreen 2nd Gen Pen Needle) 32 gauge x 5/32\" needle, Use 1x a day, Disp: 100 each, Rfl: 3    pen needle, diabetic (BD ULTRA-FINE MINI PEN NEEDLE MISC), 1 Stick 2 times a day., Disp: , Rfl:     pen needle, diabetic (TechLITE Pen Needle) 31 gauge x 5/16\" needle, Use twice daily with insulin, Disp: 200 each, Rfl: 3    pregabalin (Lyrica) 200 mg capsule, Take 1 capsule (200 mg) by mouth 3 times a day. Do not fill before July 6, 2025., Disp: 90 capsule, Rfl: 0    torsemide (Demadex) 20 mg tablet, TAKE ONE TABLET BY MOUTH EVERY DAY, Disp: 90 tablet, Rfl: 2    Toujeo SoloStar U-300 Insulin 300 unit/mL (1.5 mL) pen, Inject 26 Units under the skin once daily in the morning., Disp: 7.8 mL, Rfl: 3    cyanocobalamin (Vitamin B-12) 2,000 mcg tablet, Take 1 tablet (2,000 mcg) by mouth once daily. (Patient not taking: Reported on 7/11/2025), Disp: 90 tablet, Rfl: 1    HYDROcodone-acetaminophen (Norco) 7.5-325 mg tablet, Take 1 tablet by mouth every 6 hours if needed for severe pain (7 - 10)., Disp: 120 tablet, Rfl: 0    "

## 2025-07-15 ENCOUNTER — TELEPHONE (OUTPATIENT)
Dept: HEMATOLOGY/ONCOLOGY | Facility: CLINIC | Age: 70
End: 2025-07-15
Payer: MEDICARE

## 2025-07-15 DIAGNOSIS — D75.1 ERYTHROCYTOSIS: Primary | ICD-10-CM

## 2025-07-15 LAB
ELECTRONICALLY SIGNED BY: NORMAL
MYELOID NGS RESULTS: NORMAL

## 2025-07-15 NOTE — TELEPHONE ENCOUNTER
Attempted to call the patient regarding labs. Erythropoietin is 144, and Hemoglobin 18.4. hepatitis panel were negative. It looks like secondary erythrocytosis. Will order CT abd/pel to rule out EPO producing tumor. Left the voice message

## 2025-07-24 DIAGNOSIS — I25.10 CORONARY ARTERY DISEASE, UNSPECIFIED VESSEL OR LESION TYPE, UNSPECIFIED WHETHER ANGINA PRESENT, UNSPECIFIED WHETHER NATIVE OR TRANSPLANTED HEART: ICD-10-CM

## 2025-07-24 RX ORDER — NITROGLYCERIN 0.4 MG/1
0.4 TABLET SUBLINGUAL EVERY 5 MIN PRN
Qty: 100 TABLET | Refills: 1 | Status: SHIPPED | OUTPATIENT
Start: 2025-07-24

## 2025-07-28 ENCOUNTER — OFFICE VISIT (OUTPATIENT)
Facility: CLINIC | Age: 70
End: 2025-07-28
Payer: MEDICARE

## 2025-07-28 VITALS
DIASTOLIC BLOOD PRESSURE: 74 MMHG | HEIGHT: 72 IN | SYSTOLIC BLOOD PRESSURE: 118 MMHG | RESPIRATION RATE: 18 BRPM | OXYGEN SATURATION: 92 % | BODY MASS INDEX: 24.65 KG/M2 | HEART RATE: 76 BPM | WEIGHT: 182 LBS

## 2025-07-28 DIAGNOSIS — Z79.899 HISTORY OF ONGOING TREATMENT WITH HIGH-RISK MEDICATION: Primary | ICD-10-CM

## 2025-07-28 DIAGNOSIS — M48.062 SPINAL STENOSIS OF LUMBAR REGION WITH NEUROGENIC CLAUDICATION: ICD-10-CM

## 2025-07-28 DIAGNOSIS — M47.27 LUMBOSACRAL SPONDYLOSIS WITH RADICULOPATHY: ICD-10-CM

## 2025-07-28 PROCEDURE — 3074F SYST BP LT 130 MM HG: CPT | Performed by: PHYSICIAN ASSISTANT

## 2025-07-28 PROCEDURE — 3008F BODY MASS INDEX DOCD: CPT | Performed by: PHYSICIAN ASSISTANT

## 2025-07-28 PROCEDURE — 99214 OFFICE O/P EST MOD 30 MIN: CPT | Performed by: PHYSICIAN ASSISTANT

## 2025-07-28 PROCEDURE — 1125F AMNT PAIN NOTED PAIN PRSNT: CPT | Performed by: PHYSICIAN ASSISTANT

## 2025-07-28 PROCEDURE — 1159F MED LIST DOCD IN RCRD: CPT | Performed by: PHYSICIAN ASSISTANT

## 2025-07-28 PROCEDURE — G2211 COMPLEX E/M VISIT ADD ON: HCPCS | Performed by: PHYSICIAN ASSISTANT

## 2025-07-28 PROCEDURE — 3078F DIAST BP <80 MM HG: CPT | Performed by: PHYSICIAN ASSISTANT

## 2025-07-28 PROCEDURE — 1160F RVW MEDS BY RX/DR IN RCRD: CPT | Performed by: PHYSICIAN ASSISTANT

## 2025-07-28 RX ORDER — HYDROCODONE BITARTRATE AND ACETAMINOPHEN 7.5; 325 MG/1; MG/1
1 TABLET ORAL EVERY 6 HOURS PRN
Qty: 120 TABLET | Refills: 0 | Status: SHIPPED | OUTPATIENT
Start: 2025-07-28 | End: 2025-08-27

## 2025-07-28 RX ORDER — PREGABALIN 200 MG/1
200 CAPSULE ORAL 3 TIMES DAILY
Qty: 90 CAPSULE | Refills: 1 | Status: SHIPPED | OUTPATIENT
Start: 2025-07-28 | End: 2025-09-26

## 2025-07-28 RX ORDER — HYDROCODONE BITARTRATE AND ACETAMINOPHEN 7.5; 325 MG/1; MG/1
1 TABLET ORAL EVERY 6 HOURS PRN
Qty: 120 TABLET | Refills: 0 | Status: SHIPPED | OUTPATIENT
Start: 2025-08-27 | End: 2025-08-01 | Stop reason: ALTCHOICE

## 2025-07-28 ASSESSMENT — ENCOUNTER SYMPTOMS
ACTIVITY CHANGE: 0
ARTHRALGIAS: 1
UNEXPECTED WEIGHT CHANGE: 0
DIARRHEA: 0
CHILLS: 0
FEVER: 0
COUGH: 0
PALPITATIONS: 0
VOMITING: 0
BACK PAIN: 1
WHEEZING: 0
VOICE CHANGE: 0
NAUSEA: 0
FATIGUE: 0
NECK PAIN: 1
SHORTNESS OF BREATH: 0
SLEEP DISTURBANCE: 0

## 2025-07-28 ASSESSMENT — PAIN SCALES - GENERAL
PAINLEVEL_OUTOF10: 8
PAINLEVEL_OUTOF10: 8

## 2025-07-28 ASSESSMENT — LIFESTYLE VARIABLES
HOW OFTEN DO YOU HAVE A DRINK CONTAINING ALCOHOL: NEVER
SKIP TO QUESTIONS 9-10: 1
AUDIT-C TOTAL SCORE: 0
HOW MANY STANDARD DRINKS CONTAINING ALCOHOL DO YOU HAVE ON A TYPICAL DAY: PATIENT DOES NOT DRINK
HOW OFTEN DO YOU HAVE SIX OR MORE DRINKS ON ONE OCCASION: NEVER

## 2025-07-28 ASSESSMENT — PAIN - FUNCTIONAL ASSESSMENT: PAIN_FUNCTIONAL_ASSESSMENT: 0-10

## 2025-07-28 ASSESSMENT — PAIN DESCRIPTION - DESCRIPTORS: DESCRIPTORS: ACHING

## 2025-07-28 NOTE — PROGRESS NOTES
Subjective   Patient ID: Ha Payne is a 70 y.o. male who presents for Back Pain.  Patient is a 70-year-old male with spinal stenosis and neurogenic claudication with radiculopathy presents today for follow-up.  Patient continues to have varying levels of weakness and pain within his lower extremities.  He denies morphine use and cannot attest to why that was in his system he did have imaging prior to his appointment is considering potentially this may be the source of it.  Continues with his counseling his wife continues to help monitor his medication use    Back Pain  Pertinent negatives include no chest pain or fever.       Review of Systems   Constitutional:  Negative for activity change, chills, fatigue, fever and unexpected weight change.   HENT:  Negative for ear pain and voice change.    Eyes:  Negative for visual disturbance.   Respiratory:  Negative for cough, shortness of breath and wheezing.    Cardiovascular:  Negative for chest pain and palpitations.   Gastrointestinal:  Negative for diarrhea, nausea and vomiting.   Musculoskeletal:  Positive for arthralgias, back pain, gait problem and neck pain.   Psychiatric/Behavioral:  Negative for behavioral problems, self-injury, sleep disturbance and suicidal ideas.        Objective   Physical Exam  Vitals reviewed.   Constitutional:       Appearance: Normal appearance.   HENT:      Head: Normocephalic and atraumatic.      Mouth/Throat:      Mouth: Mucous membranes are moist.   Neck:      Vascular: No JVD.   Pulmonary:      Effort: Pulmonary effort is normal. No tachypnea or bradypnea.   Abdominal:      Palpations: Abdomen is soft.     Musculoskeletal:      Cervical back: Tenderness present. No spasms. Decreased range of motion.      Lumbar back: Spasms and tenderness present. Decreased range of motion. Positive right straight leg raise test and positive left straight leg raise test.      Comments: Hypoestheisa BLE and single point cane for amb.      Skin:     General: Skin is warm and dry.     Neurological:      Mental Status: He is alert and oriented to person, place, and time.     Psychiatric:         Mood and Affect: Mood normal.         Behavior: Behavior normal. Behavior is cooperative.         Assessment/Plan   I had nice discussion with the patient today our plan will be as follows.      Radiology: [ none at this time ]      Physically:  [ continue modification of activities, healthy lifestyle choice ]      Psychologically:  [ No acute psychological concerns. There are no mental health issues of which I am aware that are contributing to the patient's pain. There are no substance abuse or alcohol abuse issues of which I am aware that are contributing to the patient's pain. ]      Medication: [ I will refill the patient's opioids today for 2 month.  The patient continues to see benefit and improvement in their quality of life and ability to maintain ADLs.  Patient educated about the risks of taking opioids and operating a motor vehicle.  Patient reports no adverse side effects to current medication regimen.  Current regimen does allow patient to maintain ADLs.  Patient reports no new neurologic symptoms, new pain areas, or exacerbation in pain today.  Patient reports they are happy with current treatment care path.    [place] was reviewed and was consistent with the history.    Patient has been educated on the risks, benefits, and alternatives of controlled substances as well as the proper way to store these medications.  The patient and I discussed the nature of this medication and its side effects.  We discussed tolerance, physical dependence, psychological dependence, addiction and opioid-induced hyperalgesia.  We discussed the potential need to wean from this medication.  We discussed the availability of programs that can help with this process if necessary.  We discussed safety issues related to opioids including safe storage.  We discussed the fact  that the patient should not drive an automobile or operate heavy machinery while taking this medication.  A prescription for naloxone was offered to the patient.  The patient will be re-evaluated for the need to continue opioid therapy in 60 days.   Pt to repeat tox screening. ]      Duration:  [ 2 months ]      Intervention:  [ none at this time. Patient is stable.  ]        Please note that this report has been produced using speech recognition software. It may contain errors related to grammar, punctuation or spelling. Electronically signed, but not reviewed.              Best Gonzalez PA-C 07/28/25 12:00 PM

## 2025-07-28 NOTE — PROGRESS NOTES
MEDICATION NAME: Norco  STRENGTH: 7.5-325  LAST FILL DATE: 25  DATE LAST TAKEN: 25  QUANTITY FILLED: 120  QUANTITY REMAININ  COUNT COMPLETED BY: DEANNA ALFARO RN and L.LOVE, RN      UDS COMPLETED  CONTROLLED SUBSTANCES AGREEMENT SIGNED  ORT COMPLETED  Modified Osw[employer] disability form filled out annually.

## 2025-07-30 LAB
1OH-MIDAZOLAM UR-MCNC: NEGATIVE NG/ML
7AMINOCLONAZEPAM UR-MCNC: NEGATIVE NG/ML
A-OH ALPRAZ UR-MCNC: NEGATIVE NG/ML
A-OH-TRIAZOLAM UR-MCNC: NEGATIVE NG/ML
AMPHETAMINES UR QL: NEGATIVE NG/ML
BARBITURATES UR QL: NEGATIVE NG/ML
BZE UR QL: NEGATIVE NG/ML
CODEINE UR-MCNC: NEGATIVE NG/ML
CREAT UR-MCNC: 136.2 MG/DL
DRUG SCREEN COMMENT UR-IMP: NORMAL
EDDP UR-MCNC: NEGATIVE NG/ML
FENTANYL UR-MCNC: NEGATIVE NG/ML
HYDROCODONE UR-MCNC: NEGATIVE NG/ML
HYDROMORPHONE UR-MCNC: NEGATIVE NG/ML
LORAZEPAM UR-MCNC: NEGATIVE NG/ML
METHADONE UR-MCNC: NEGATIVE NG/ML
MORPHINE UR-MCNC: NEGATIVE NG/ML
NORDIAZEPAM UR-MCNC: NEGATIVE NG/ML
NORFENTANYL UR-MCNC: NEGATIVE NG/ML
NORHYDROCODONE UR CFM-MCNC: NEGATIVE NG/ML
NOROXYCODONE UR CFM-MCNC: NEGATIVE NG/ML
NORTRAMADOL UR-MCNC: NEGATIVE NG/ML
OH-ETHYLFLURAZ UR-MCNC: NEGATIVE NG/ML
OXAZEPAM UR-MCNC: NEGATIVE NG/ML
OXIDANTS UR QL: NEGATIVE MCG/ML
OXYCODONE UR CFM-MCNC: NEGATIVE NG/ML
OXYMORPHONE UR CFM-MCNC: NEGATIVE NG/ML
PCP UR QL: NEGATIVE NG/ML
PH UR: 5.2 [PH] (ref 4.5–9)
QUEST 6 ACETYLMORPHINE: NEGATIVE NG/ML
QUEST NOTES AND COMMENTS: NORMAL
QUEST ZOLPIDEM: NEGATIVE NG/ML
TEMAZEPAM UR-MCNC: NEGATIVE NG/ML
THC UR QL: NEGATIVE NG/ML
TRAMADOL UR-MCNC: NEGATIVE NG/ML
ZOLPIDEM PHENYL-4-CARB UR CFM-MCNC: NEGATIVE NG/ML

## 2025-08-01 ENCOUNTER — TELEMEDICINE (OUTPATIENT)
Dept: HEMATOLOGY/ONCOLOGY | Facility: CLINIC | Age: 70
End: 2025-08-01
Payer: MEDICARE

## 2025-08-01 DIAGNOSIS — R53.83 OTHER FATIGUE: ICD-10-CM

## 2025-08-01 DIAGNOSIS — D75.1 ERYTHROCYTOSIS: ICD-10-CM

## 2025-08-01 DIAGNOSIS — D69.6 THROMBOCYTOPENIA: ICD-10-CM

## 2025-08-01 DIAGNOSIS — D75.1 POLYCYTHEMIA: ICD-10-CM

## 2025-08-01 PROCEDURE — 99214 OFFICE O/P EST MOD 30 MIN: CPT | Performed by: STUDENT IN AN ORGANIZED HEALTH CARE EDUCATION/TRAINING PROGRAM

## 2025-08-01 NOTE — PROGRESS NOTES
After speaking to Dr. Lunsford with patient's made toxicology screen showing morphine and codeine which are nonprescribed medications and I do not see any medical procedures that would correlate with this being in his system.  Patient's most recent toxicology screen reported dosing of medication the day of his toxicology screen and it is showing negative results for any of his medications in his system.  Per the patient's difficulties with being compliant with opiate therapies in the past at this point I think it is appropriate that we are going to discontinue opiate therapies.  Patient will be continued as opiate therapies.  This was reviewed by myself and I am in agreement with the plan.

## 2025-08-04 NOTE — PROGRESS NOTES
Patient ID: Ha Payne is a 70 y.o. male.  Referring Physician: Kathleen Robles MD  [address]  [facility] [place] [address]  Primary Care Provider: Bill Sue MD  Referral Reason: erythrocytosis and thrombocytopenia    Interim history  Called patient for telephone/virtual visit. Reviewed labs. Myeloid NGS panel was negative. But EPO level elevated at 144, which could be the cause of erythrocytosis Hgb at 18.4. Explained that next step will be CT abd/pel to look for EPO producing tumor commonly liver or kidney tumor. But patient refused pursuing further work up or treatment even if cause is found. He voiced understanding the consequence and his wife also agreed with patient's decision. His decision was appreciated and no further workup/appt planned. They will call back if they change their mind.     HPI:  Mr Payne is a 69 yo male with history of CHF with ICD placement, diabetes and chronic degenerative disc disorder, who was referred for erythrocytosis and thrombocytopenia. Patient complaints of chronic back pain due to DDD. He was former smoker but quitted more than 10 years ago, He denies history of asthma or COPD or sleep apnea. He denies symptoms like headache, pruritus, fatigue, abdominal discomfort. Reviewing labs, hemoglobin ranged 14-16.5 since 2020, but it consistently elevated above 17 since , hematocrit >50, also has chronic thrombocytopenia 50-80, normal WBC.       Past Medical History: Medical History[1]  Social History:   Social History     Socioeconomic History    Marital status:      Spouse name: Not on file    Number of children: Not on file    Years of education: Not on file    Highest education level: Not on file   Occupational History    Not on file   Tobacco Use    Smoking status: Former     Current packs/day: 0.00     Types: Cigarettes     Quit date: 1995     Years since quittin.6    Smokeless tobacco: Never   Vaping Use    Vaping status: Never Used   Substance  and Sexual Activity    Alcohol use: Never    Drug use: Yes     Types: Hydrocodone    Sexual activity: Not on file   Other Topics Concern    Not on file   Social History Narrative    Not on file     Social Drivers of Health     Financial Resource Strain: Not on file   Food Insecurity: Not on file   Transportation Needs: Not on file   Physical Activity: Not on file   Stress: Not on file   Social Connections: Not on file   Intimate Partner Violence: Not on file   Housing Stability: Not on file     Surgical History: Surgical History[2]  Family History: Family History[3]   reports that he quit smoking about 30 years ago. His smoking use included cigarettes. He has never used smokeless tobacco.  Oncology Family history: Cancer-related family history is not on file.    Review Of Systems:  General: no fatigue, weight change, appetite change  Ears/Nose/Mouth/Throat: no mouth sore, no hearing loss, no nasal congestion, no sore throat  Cardiovascular: no chest pain, no shortness of breath, no palpitation  Pulmonary: no cough, no wheezing, no hemoptysis  GI: no nausea, no vomiting, no diarrhea or constipation, no bleeding per rectum  Neurological: no dizziness, no seizure, no weakness, no sensation change  Musculoskeletal: no joint swelling, no bone pain, no back pain  Skin: no skin rash, no bruising, no skin lesion      Physical Exam:  There were no vitals taken for this visit.  BSA: There is no height or weight on file to calculate BSA.  Physical exam was not performed since it was virtual visit.     Results:  Diagnostic Results   Lab Results   Component Value Date    WBC 6.6 07/09/2025    HGB 18.4 (H) 07/09/2025    HCT 59.4 (H) 07/09/2025     (H) 07/09/2025    PLT  07/09/2025      Comment:      69; Platelet count verified by smear review     Lab Results   Component Value Date    CALCIUM 9.2 07/09/2025     07/09/2025    K 4.2 07/09/2025    CO2 29 07/09/2025    CL 98 07/09/2025    BUN 30 (H) 07/09/2025     CREATININE 1.43 (H) 07/09/2025    ALT 14 07/09/2025    AST 17 07/09/2025     Current Medications[4]     Radiology:  [unfilled]     Pathology:    Assessment/Plan:    Erythrocytosis  - Reviewing labs, hemoglobin ranged 14-16.5 since 2020, but it consistently elevated above 17 since 2024, hematocrit >50, also has chronic thrombocytopenia 50-80, normal WBC.   - no history of thrombosis except CAD, currently on aspirin  - asymptomatic  Myeloid NGS panel was negative. But EPO level elevated at 144, which could be the cause of erythrocytosis Hgb at 18.4. Explained that next step will be CT abd/pel to look for EPO producing tumor commonly liver or kidney tumor. But patient refused pursuing further work up or treatment even if cause is found. He voiced understanding the consequence and his wife also agreed with patient's decision. His decision was appreciated and no further workup/appt planned. They will call back if they change their mind.     Chronic thrombocytopenia  - check CBC, CMP, hepatitis panel, HIV, retic count, vitamin B12  - discussed patient that he will need bone marrow biopsy if above tests are inconclusive. He states that he is 70 years old, even he knows diagnosis he would not go through treatment, so He is not interested in [facility].    This telehealth service was completed with real time, interactive audio communication via telephone since patient was not willing to perform video call. The virtual patient encounter today between the patient (at the originating site) and provider (at the distant site) included a conversation about their symptoms, a limited exam via real time audio, and a discussion of the plan and follow-up.  This telemedicine encounter will be billed to insurance. Co-pays or deductibles may apply. If it was determined that the patient should be evaluated in clinic, then they were directed to do so. Patient expressed understanding and preferred to discuss their care via telehealth  "encounter rather than travel to the office. Verbal consent was requested and obtained from Ha on this date, August 4, 2025 for a telehealth visit.  Duration of telemedicine encounter: Spent 30 minutes with patient face-to-face via [email or web address] and more than 50% of this time was spent in counseling and coordination of care.    Kathleen Robles MD  Hematology                        [1]   Past Medical History:  Diagnosis Date    Acute ischemic heart disease, unspecified 06/27/2022    Acute coronary syndrome    Allergic     Arthritis     Back pain     Cervical disc disorder     CHF (congestive heart failure)     Diabetes mellitus (Multi)     Extremity pain     Heart disease     Hypertension     Intracardiac thrombosis, not elsewhere classified 08/12/2022    Apical mural thrombus    Low back pain     Neck pain     Old myocardial infarction 08/12/2022    History of acute myocardial infarction    Opioid dependence, uncomplicated (Multi) 10/03/2013    Opioid dependence    Other conditions influencing health status 04/17/2013    Disorder Of The Pelvis / Hip Joint / Femur    Other conditions influencing health status 04/17/2013    Toxicity From Drugs, Medicaments, Or Biological Substances    Peripheral neuropathy     Spinal stenosis    [2]   Past Surgical History:  Procedure Laterality Date    CARDIAC CATHETERIZATION     [3]   Family History  Problem Relation Name Age of Onset    Heart disease Mother Jess     Heart disease Father Ramírez     Diabetes Brother Frank     Coronary artery disease Paternal Grandfather Jess     Heart disease Brother Frank Elmore    [4]   Current Outpatient Medications:     acetaminophen (Tylenol) 325 mg tablet, Take 1-2 tablets (325-650 mg) by mouth every 4 hours if needed., Disp: , Rfl:     aspirin 81 mg chewable tablet, Chew and swallow 1 tablet (81 mg) once every 24 hours., Disp: , Rfl:     BD Ultra-Fine Mini Pen Needle 31 gauge x 3/16\" needle, 1 each., Disp: , Rfl:     carvedilol (Coreg) 25 mg " "tablet, TAKE ONE TABLET BY MOUTH TWO TIMES A DAY with meals, Disp: 180 tablet, Rfl: 3    cholecalciferol (Vitamin D-3) 25 MCG (1000 UT) capsule, Take 1 capsule (25 mcg) by mouth once daily., Disp: , Rfl:     cyanocobalamin (Vitamin B-12) 2,000 mcg tablet, Take 1 tablet (2,000 mcg) by mouth once daily., Disp: 90 tablet, Rfl: 1    Entresto  mg tablet, TAKE 1 TABLET BY MOUTH TWICE A DAY, Disp: 180 tablet, Rfl: 3    eplerenone (Inspra) 50 mg tablet, TAKE ONE TABLET BY MOUTH EVERY DAY, Disp: 90 tablet, Rfl: 3    ezetimibe-simvastatin (Vytorin) 10-40 mg tablet, TAKE ONE TABLET BY MOUTH ONCE DAILY AT BEDTIME, Disp: 90 tablet, Rfl: 3    famotidine (Pepcid) 20 mg tablet, Take 1 tablet (20 mg) by mouth if needed., Disp: , Rfl:     folic acid (Folvite) 1 mg tablet, Take 1 tablet (1 mg) by mouth once daily., Disp: 90 tablet, Rfl: 1    glipiZIDE (Glucotrol) 5 mg tablet, Take 3 tablets (15 mg) by mouth once daily. 2 tablets in am and 1 tablet in qpm, Disp: 270 tablet, Rfl: 3    HYDROcodone-acetaminophen (Norco) 7.5-325 mg tablet, Take 1 tablet by mouth every 6 hours if needed for severe pain (7 - 10)., Disp: 120 tablet, Rfl: 0    loperamide (Imodium A-D) 2 mg capsule, Take 1 capsule (2 mg) by mouth 4 times a day as needed., Disp: , Rfl:     nitroglycerin (Nitrostat) 0.4 mg SL tablet, Place 1 tablet (0.4 mg) under the tongue every 5 minutes if needed for chest pain. 4 x 25 bottles please, Disp: 100 tablet, Rfl: 1    pen needle, diabetic (BD Doreen 2nd Gen Pen Needle) 32 gauge x 5/32\" needle, Use 1x a day, Disp: 100 each, Rfl: 3    pen needle, diabetic (BD ULTRA-FINE MINI PEN NEEDLE MISC), 1 Stick 2 times a day., Disp: , Rfl:     pen needle, diabetic (TechLITE Pen Needle) 31 gauge x 5/16\" needle, Use twice daily with insulin, Disp: 200 each, Rfl: 3    pregabalin (Lyrica) 200 mg capsule, Take 1 capsule (200 mg) by mouth 3 times a day., Disp: 90 capsule, Rfl: 1    torsemide (Demadex) 20 mg tablet, TAKE ONE TABLET BY MOUTH EVERY " DAY, Disp: 90 tablet, Rfl: 2    Toujeo SoloStar U-300 Insulin 300 unit/mL (1.5 mL) pen, Inject 26 Units under the skin once daily in the morning., Disp: 7.8 mL, Rfl: 3

## 2025-08-05 ENCOUNTER — TELEPHONE (OUTPATIENT)
Dept: PAIN MEDICINE | Facility: CLINIC | Age: 70
End: 2025-08-05
Payer: MEDICARE

## 2025-08-05 NOTE — TELEPHONE ENCOUNTER
Pt called stating Xavier called him and he is returning his call. Pt can be reached at [phone]

## 2025-08-06 ENCOUNTER — HOSPITAL ENCOUNTER (OUTPATIENT)
Dept: CARDIOLOGY | Facility: CLINIC | Age: 70
Discharge: HOME | End: 2025-08-06
Payer: MEDICARE

## 2025-08-06 DIAGNOSIS — I50.22 CHRONIC SYSTOLIC CONGESTIVE HEART FAILURE: ICD-10-CM

## 2025-08-06 DIAGNOSIS — I42.5 OTHER RESTRICTIVE CARDIOMYOPATHY: ICD-10-CM

## 2025-08-06 DIAGNOSIS — Z95.810 PRESENCE OF AUTOMATIC (IMPLANTABLE) CARDIAC DEFIBRILLATOR: ICD-10-CM

## 2025-08-06 PROCEDURE — 93296 REM INTERROG EVL PM/IDS: CPT

## 2025-08-06 RX ORDER — EPLERENONE 50 MG/1
50 TABLET ORAL DAILY
Qty: 90 TABLET | Refills: 3 | Status: SHIPPED | OUTPATIENT
Start: 2025-08-06

## 2025-08-18 ENCOUNTER — HOSPITAL ENCOUNTER (OUTPATIENT)
Dept: CARDIOLOGY | Facility: CLINIC | Age: 70
Discharge: HOME | End: 2025-08-18
Payer: MEDICARE

## 2025-08-18 DIAGNOSIS — I42.9 CARDIOMYOPATHY, UNSPECIFIED TYPE (MULTI): ICD-10-CM

## 2025-08-19 DIAGNOSIS — I42.9 CARDIOMYOPATHY, UNSPECIFIED TYPE (MULTI): Primary | ICD-10-CM

## 2025-08-19 DIAGNOSIS — Z95.810 PRESENCE OF AUTOMATIC (IMPLANTABLE) CARDIAC DEFIBRILLATOR: ICD-10-CM

## 2025-08-19 DIAGNOSIS — Z01.818 PREOP TESTING: ICD-10-CM

## 2025-08-26 DIAGNOSIS — M48.062 SPINAL STENOSIS OF LUMBAR REGION WITH NEUROGENIC CLAUDICATION: ICD-10-CM

## 2025-08-26 DIAGNOSIS — M47.27 LUMBOSACRAL SPONDYLOSIS WITH RADICULOPATHY: ICD-10-CM

## 2025-08-26 RX ORDER — PREGABALIN 200 MG/1
200 CAPSULE ORAL 3 TIMES DAILY
Qty: 270 CAPSULE | Refills: 1 | Status: SHIPPED | OUTPATIENT
Start: 2025-08-26 | End: 2026-08-26

## 2025-09-03 ENCOUNTER — APPOINTMENT (OUTPATIENT)
Dept: PRIMARY CARE | Facility: CLINIC | Age: 70
End: 2025-09-03
Payer: MEDICARE

## 2025-09-03 ASSESSMENT — PAIN SCALES - GENERAL: PAINLEVEL_OUTOF10: 8

## 2025-09-09 ENCOUNTER — APPOINTMENT (OUTPATIENT)
Dept: HEMATOLOGY/ONCOLOGY | Facility: CLINIC | Age: 70
End: 2025-09-09
Payer: MEDICARE

## 2025-09-16 ENCOUNTER — APPOINTMENT (OUTPATIENT)
Dept: CARDIOLOGY | Facility: CLINIC | Age: 70
End: 2025-09-16
Payer: MEDICARE

## 2025-11-04 ENCOUNTER — APPOINTMENT (OUTPATIENT)
Dept: ENDOCRINOLOGY | Facility: CLINIC | Age: 70
End: 2025-11-04
Payer: MEDICARE

## 2026-06-03 ENCOUNTER — APPOINTMENT (OUTPATIENT)
Dept: PRIMARY CARE | Facility: CLINIC | Age: 71
End: 2026-06-03
Payer: MEDICARE